# Patient Record
Sex: FEMALE | Race: WHITE | Employment: OTHER | URBAN - METROPOLITAN AREA
[De-identification: names, ages, dates, MRNs, and addresses within clinical notes are randomized per-mention and may not be internally consistent; named-entity substitution may affect disease eponyms.]

---

## 2017-04-07 ENCOUNTER — HOSPITAL ENCOUNTER (OUTPATIENT)
Dept: LAB | Age: 62
Discharge: HOME | End: 2017-04-07
Attending: INTERNAL MEDICINE
Payer: COMMERCIAL

## 2017-04-07 LAB
ALBUMIN SERPL BCP-MCNC: 3.9 G/DL (ref 3.2–4.6)
ALBUMIN/GLOB SERPL: 1.1 {RATIO}
ALP SERPL-CCNC: 108 U/L (ref 50–136)
ALT SERPL-CCNC: 30 U/L (ref 12–65)
ANION GAP BLD CALC-SCNC: 7 MMOL/L
AST SERPL W P-5'-P-CCNC: 28 U/L (ref 15–37)
BILIRUB SERPL-MCNC: 0.6 MG/DL (ref 0.2–1.1)
BUN SERPL-MCNC: 23 MG/DL (ref 8–23)
CALCIUM SERPL-MCNC: 8.6 MG/DL (ref 8.3–10.4)
CHLORIDE SERPL-SCNC: 103 MMOL/L (ref 98–107)
CHOLEST SERPL-MCNC: 137 MG/DL
CO2 SERPL-SCNC: 28 MMOL/L (ref 23–32)
CREAT SERPL-MCNC: 1.3 MG/DL (ref 0.6–1)
GLOBULIN SER CALC-MCNC: 3.7 G/DL
GLUCOSE SERPL-MCNC: 96 MG/DL (ref 65–100)
HDLC SERPL-MCNC: 72 MG/DL (ref 40–60)
HDLC SERPL: 1.9 {RATIO}
LDLC SERPL CALC-MCNC: 46.4 MG/DL
LIPID PROFILE,FLP: ABNORMAL
MAGNESIUM SERPL-MCNC: 2.3 MG/DL (ref 1.8–2.4)
POTASSIUM SERPL-SCNC: 4.4 MMOL/L (ref 3.5–5.1)
PROT SERPL-MCNC: 7.6 G/DL (ref 6.3–8.2)
SODIUM SERPL-SCNC: 138 MMOL/L (ref 136–145)
T4 FREE SERPL-MCNC: 1.7 NG/DL (ref 0.78–1.46)
TRIGL SERPL-MCNC: 93 MG/DL (ref 35–150)
TSH SERPL DL<=0.005 MIU/L-ACNC: 2.14 UIU/ML (ref 0.36–3.74)
VLDLC SERPL CALC-MCNC: 18.6 MG/DL

## 2017-04-07 PROCEDURE — 84439 ASSAY OF FREE THYROXINE: CPT | Performed by: INTERNAL MEDICINE

## 2017-04-07 PROCEDURE — 80061 LIPID PANEL: CPT | Performed by: INTERNAL MEDICINE

## 2017-04-07 PROCEDURE — 83735 ASSAY OF MAGNESIUM: CPT | Performed by: INTERNAL MEDICINE

## 2017-04-07 PROCEDURE — 80053 COMPREHEN METABOLIC PANEL: CPT | Performed by: INTERNAL MEDICINE

## 2017-04-07 PROCEDURE — 36415 COLL VENOUS BLD VENIPUNCTURE: CPT | Performed by: INTERNAL MEDICINE

## 2017-04-07 PROCEDURE — 84443 ASSAY THYROID STIM HORMONE: CPT | Performed by: INTERNAL MEDICINE

## 2017-05-26 ENCOUNTER — HOSPITAL ENCOUNTER (OUTPATIENT)
Dept: LAB | Age: 62
Discharge: HOME | End: 2017-05-26
Attending: INTERNAL MEDICINE
Payer: COMMERCIAL

## 2017-05-26 DIAGNOSIS — I50.22 CHRONIC SYSTOLIC CHF (CONGESTIVE HEART FAILURE) (HCC): ICD-10-CM

## 2017-05-26 DIAGNOSIS — I48.19 PERSISTENT ATRIAL FIBRILLATION (HCC): ICD-10-CM

## 2017-05-26 LAB
ALBUMIN SERPL BCP-MCNC: 3.7 G/DL (ref 3.2–4.6)
ALBUMIN/GLOB SERPL: 0.9 {RATIO}
ALP SERPL-CCNC: 110 U/L (ref 50–136)
ALT SERPL-CCNC: 29 U/L (ref 12–65)
ANION GAP BLD CALC-SCNC: 6 MMOL/L
AST SERPL W P-5'-P-CCNC: 23 U/L (ref 15–37)
BILIRUB SERPL-MCNC: 0.6 MG/DL (ref 0.2–1.1)
BNP SERPL-MCNC: 180 PG/ML
BUN SERPL-MCNC: 16 MG/DL (ref 8–23)
CALCIUM SERPL-MCNC: 8.9 MG/DL (ref 8.3–10.4)
CHLORIDE SERPL-SCNC: 104 MMOL/L (ref 98–107)
CO2 SERPL-SCNC: 30 MMOL/L (ref 21–32)
CREAT SERPL-MCNC: 1.2 MG/DL (ref 0.6–1)
GLOBULIN SER CALC-MCNC: 4 G/DL
GLUCOSE SERPL-MCNC: 106 MG/DL (ref 65–100)
MAGNESIUM SERPL-MCNC: 2.3 MG/DL (ref 1.8–2.4)
POTASSIUM SERPL-SCNC: 4.6 MMOL/L (ref 3.5–5.1)
PROT SERPL-MCNC: 7.7 G/DL (ref 6.3–8.2)
SODIUM SERPL-SCNC: 140 MMOL/L (ref 136–145)

## 2017-05-26 PROCEDURE — 83880 ASSAY OF NATRIURETIC PEPTIDE: CPT | Performed by: INTERNAL MEDICINE

## 2017-05-26 PROCEDURE — 36415 COLL VENOUS BLD VENIPUNCTURE: CPT | Performed by: INTERNAL MEDICINE

## 2017-05-26 PROCEDURE — 80053 COMPREHEN METABOLIC PANEL: CPT | Performed by: INTERNAL MEDICINE

## 2017-05-26 PROCEDURE — 83735 ASSAY OF MAGNESIUM: CPT | Performed by: INTERNAL MEDICINE

## 2017-09-07 ENCOUNTER — HOSPITAL ENCOUNTER (OUTPATIENT)
Dept: LAB | Age: 62
Discharge: HOME | End: 2017-09-07
Attending: INTERNAL MEDICINE
Payer: COMMERCIAL

## 2017-09-07 DIAGNOSIS — I50.22 CHRONIC SYSTOLIC CHF (CONGESTIVE HEART FAILURE) (HCC): ICD-10-CM

## 2017-09-07 DIAGNOSIS — I48.19 PERSISTENT ATRIAL FIBRILLATION (HCC): ICD-10-CM

## 2017-09-07 LAB
ALBUMIN SERPL-MCNC: 3.6 G/DL (ref 3.2–4.6)
ALBUMIN/GLOB SERPL: 0.9 {RATIO}
ALP SERPL-CCNC: 109 U/L (ref 50–136)
ALT SERPL-CCNC: 23 U/L (ref 12–65)
ANION GAP SERPL CALC-SCNC: 7 MMOL/L
AST SERPL-CCNC: 19 U/L (ref 15–37)
BILIRUB SERPL-MCNC: 0.5 MG/DL (ref 0.2–1.1)
BNP SERPL-MCNC: 119 PG/ML
BUN SERPL-MCNC: 19 MG/DL (ref 8–23)
CALCIUM SERPL-MCNC: 8.5 MG/DL (ref 8.3–10.4)
CHLORIDE SERPL-SCNC: 105 MMOL/L (ref 98–107)
CO2 SERPL-SCNC: 26 MMOL/L (ref 21–32)
CREAT SERPL-MCNC: 1.1 MG/DL (ref 0.6–1)
GLOBULIN SER CALC-MCNC: 3.8 G/DL
GLUCOSE SERPL-MCNC: 107 MG/DL (ref 65–100)
MAGNESIUM SERPL-MCNC: 2.2 MG/DL (ref 1.8–2.4)
POTASSIUM SERPL-SCNC: 4.1 MMOL/L (ref 3.5–5.1)
PROT SERPL-MCNC: 7.4 G/DL (ref 6.3–8.2)
SODIUM SERPL-SCNC: 138 MMOL/L (ref 136–145)

## 2017-09-07 PROCEDURE — 80053 COMPREHEN METABOLIC PANEL: CPT | Performed by: INTERNAL MEDICINE

## 2017-09-07 PROCEDURE — 36415 COLL VENOUS BLD VENIPUNCTURE: CPT | Performed by: INTERNAL MEDICINE

## 2017-09-07 PROCEDURE — 83880 ASSAY OF NATRIURETIC PEPTIDE: CPT | Performed by: INTERNAL MEDICINE

## 2017-09-07 PROCEDURE — 83735 ASSAY OF MAGNESIUM: CPT | Performed by: INTERNAL MEDICINE

## 2017-11-07 ENCOUNTER — HOSPITAL ENCOUNTER (OUTPATIENT)
Dept: ULTRASOUND IMAGING | Age: 62
Discharge: HOME | End: 2017-11-07
Attending: INTERNAL MEDICINE
Payer: COMMERCIAL

## 2017-11-07 DIAGNOSIS — M79.604 RIGHT LEG PAIN: ICD-10-CM

## 2017-11-07 DIAGNOSIS — M25.471 ANKLE SWELLING, RIGHT: ICD-10-CM

## 2017-11-07 PROCEDURE — 93971 EXTREMITY STUDY: CPT

## 2018-03-14 ENCOUNTER — HOSPITAL ENCOUNTER (OUTPATIENT)
Dept: LAB | Age: 63
Discharge: HOME | End: 2018-03-14
Attending: INTERNAL MEDICINE
Payer: MEDICARE

## 2018-03-14 DIAGNOSIS — I50.22 CHRONIC SYSTOLIC CHF (CONGESTIVE HEART FAILURE) (HCC): ICD-10-CM

## 2018-03-14 DIAGNOSIS — E78.00 PURE HYPERCHOLESTEROLEMIA: ICD-10-CM

## 2018-03-14 DIAGNOSIS — I48.19 PERSISTENT ATRIAL FIBRILLATION (HCC): ICD-10-CM

## 2018-03-14 LAB
ALBUMIN SERPL-MCNC: 4.3 G/DL (ref 3.2–4.6)
ALBUMIN/GLOB SERPL: 1 {RATIO}
ALP SERPL-CCNC: 114 U/L (ref 50–136)
ALT SERPL-CCNC: 25 U/L (ref 12–65)
ANION GAP SERPL CALC-SCNC: 5 MMOL/L
AST SERPL-CCNC: 24 U/L (ref 15–37)
BILIRUB SERPL-MCNC: 0.7 MG/DL (ref 0.2–1.1)
BUN SERPL-MCNC: 23 MG/DL (ref 8–23)
CALCIUM SERPL-MCNC: 9.2 MG/DL (ref 8.3–10.4)
CHLORIDE SERPL-SCNC: 102 MMOL/L (ref 98–107)
CHOLEST SERPL-MCNC: 133 MG/DL
CO2 SERPL-SCNC: 30 MMOL/L (ref 21–32)
CREAT SERPL-MCNC: 1.2 MG/DL (ref 0.6–1)
GLOBULIN SER CALC-MCNC: 4.3 G/DL
GLUCOSE SERPL-MCNC: 108 MG/DL (ref 65–100)
HDLC SERPL-MCNC: 61 MG/DL (ref 40–60)
HDLC SERPL: 2.2 {RATIO}
LDLC SERPL CALC-MCNC: 43.2 MG/DL
LIPID PROFILE,FLP: ABNORMAL
MAGNESIUM SERPL-MCNC: 2.3 MG/DL (ref 1.8–2.4)
POTASSIUM SERPL-SCNC: 4.5 MMOL/L (ref 3.5–5.1)
PROT SERPL-MCNC: 8.6 G/DL (ref 6.3–8.2)
SODIUM SERPL-SCNC: 137 MMOL/L (ref 136–145)
TRIGL SERPL-MCNC: 144 MG/DL (ref 35–150)
VLDLC SERPL CALC-MCNC: 28.8 MG/DL (ref 6–23)

## 2018-03-14 PROCEDURE — 36415 COLL VENOUS BLD VENIPUNCTURE: CPT | Performed by: INTERNAL MEDICINE

## 2018-03-14 PROCEDURE — 83735 ASSAY OF MAGNESIUM: CPT | Performed by: INTERNAL MEDICINE

## 2018-03-14 PROCEDURE — 80053 COMPREHEN METABOLIC PANEL: CPT | Performed by: INTERNAL MEDICINE

## 2018-03-14 PROCEDURE — 80061 LIPID PANEL: CPT | Performed by: INTERNAL MEDICINE

## 2018-03-30 ENCOUNTER — TELEPHONE (OUTPATIENT)
Dept: CARDIOLOGY | Age: 63
End: 2018-03-30

## 2018-03-30 NOTE — TELEPHONE ENCOUNTER
Patient called with concerns regarding nose bleed. She stated it lasted approx. 25 minutes and running down throat and \"was like a faucet. \"  Reviewed medications and she is on ASA and eliquis. Had her hold ASA over weekend. Will forward message to triage.      Sagar Tinajero NP

## 2018-04-02 NOTE — TELEPHONE ENCOUNTER
Triage informed Dr. Yury Palma in Dr. Bruno Lincoln absence. Per Dr. Marita Capone, continue to hold aspirin and discuss with Dr. Fraga Stands 4/9/18 when he returns to the office. Triage informed pt of Dr. Sandrita Hernández response. She verbalizes understanding.

## 2018-04-02 NOTE — TELEPHONE ENCOUNTER
Pt states she developed a nosebleed this weekend that lasted 30 minutes and was choking her as the blood ran down her throat. States Wynelle Even. NP advised her to hold her aspirin, and she has not had any recurrence over the weekend. Pt denies having past stents and continues on Eliquis 5mg bid. Triage will discuss with another physician in Dr. Benjamín sagastume.

## 2018-04-09 NOTE — TELEPHONE ENCOUNTER
Triage informed Dr. Shi Bachelor. Per Dr. Shi Bachelor, pt may stop aspirin 81mg. Triage informed pt of Dr. Rancho Benitez response. Pt verbalizes understanding.

## 2018-10-17 ENCOUNTER — HOSPITAL ENCOUNTER (OUTPATIENT)
Dept: LAB | Age: 63
Discharge: HOME | End: 2018-10-17
Payer: MEDICARE

## 2018-10-17 DIAGNOSIS — I50.22 CHRONIC SYSTOLIC CHF (CONGESTIVE HEART FAILURE) (HCC): ICD-10-CM

## 2018-10-17 LAB
ANION GAP SERPL CALC-SCNC: 7 MMOL/L
BUN SERPL-MCNC: 23 MG/DL (ref 8–23)
CALCIUM SERPL-MCNC: 9 MG/DL (ref 8.3–10.4)
CHLORIDE SERPL-SCNC: 100 MMOL/L (ref 98–107)
CO2 SERPL-SCNC: 29 MMOL/L (ref 21–32)
CREAT SERPL-MCNC: 1.4 MG/DL (ref 0.6–1)
GLUCOSE SERPL-MCNC: 100 MG/DL (ref 65–100)
POTASSIUM SERPL-SCNC: 4.3 MMOL/L (ref 3.5–5.1)
SODIUM SERPL-SCNC: 136 MMOL/L (ref 136–145)

## 2018-10-17 PROCEDURE — 80048 BASIC METABOLIC PNL TOTAL CA: CPT

## 2018-10-17 PROCEDURE — 36415 COLL VENOUS BLD VENIPUNCTURE: CPT

## 2019-08-22 PROBLEM — G47.00 PERSISTENT DISORDER OF INITIATING OR MAINTAINING SLEEP: Status: ACTIVE | Noted: 2019-08-22

## 2019-10-01 ENCOUNTER — HOSPITAL ENCOUNTER (OUTPATIENT)
Dept: LAB | Age: 64
Discharge: HOME | End: 2019-10-01
Payer: MEDICARE

## 2019-10-01 DIAGNOSIS — I50.22 CHRONIC SYSTOLIC CHF (CONGESTIVE HEART FAILURE) (HCC): ICD-10-CM

## 2019-10-01 DIAGNOSIS — I48.19 PERSISTENT ATRIAL FIBRILLATION (HCC): ICD-10-CM

## 2019-10-01 LAB
ANION GAP SERPL CALC-SCNC: 8 MMOL/L (ref 7–16)
BUN SERPL-MCNC: 16 MG/DL (ref 8–23)
CALCIUM SERPL-MCNC: 9 MG/DL (ref 8.3–10.4)
CHLORIDE SERPL-SCNC: 103 MMOL/L (ref 98–107)
CO2 SERPL-SCNC: 29 MMOL/L (ref 21–32)
CREAT SERPL-MCNC: 1 MG/DL (ref 0.6–1)
GLUCOSE SERPL-MCNC: 95 MG/DL (ref 65–100)
POTASSIUM SERPL-SCNC: 4 MMOL/L (ref 3.5–5.1)
SODIUM SERPL-SCNC: 140 MMOL/L (ref 136–145)

## 2019-10-01 PROCEDURE — 36415 COLL VENOUS BLD VENIPUNCTURE: CPT

## 2019-10-01 PROCEDURE — 80048 BASIC METABOLIC PNL TOTAL CA: CPT

## 2019-11-07 ENCOUNTER — HOSPITAL ENCOUNTER (OUTPATIENT)
Dept: LAB | Age: 64
Discharge: HOME | End: 2019-11-07
Payer: MEDICARE

## 2019-11-07 DIAGNOSIS — I48.19 PERSISTENT ATRIAL FIBRILLATION (HCC): ICD-10-CM

## 2019-11-07 LAB
ANION GAP SERPL CALC-SCNC: 7 MMOL/L (ref 7–16)
BASOPHILS # BLD: 0 K/UL (ref 0–0.2)
BASOPHILS NFR BLD: 1 % (ref 0–2)
BUN SERPL-MCNC: 19 MG/DL (ref 8–23)
CALCIUM SERPL-MCNC: 8.8 MG/DL (ref 8.3–10.4)
CHLORIDE SERPL-SCNC: 106 MMOL/L (ref 98–107)
CO2 SERPL-SCNC: 28 MMOL/L (ref 21–32)
CREAT SERPL-MCNC: 1 MG/DL (ref 0.6–1)
DIFFERENTIAL METHOD BLD: ABNORMAL
EOSINOPHIL # BLD: 0.1 K/UL (ref 0–0.8)
EOSINOPHIL NFR BLD: 2 % (ref 0.5–7.8)
ERYTHROCYTE [DISTWIDTH] IN BLOOD BY AUTOMATED COUNT: 13.1 % (ref 11.9–14.6)
GLUCOSE SERPL-MCNC: 132 MG/DL (ref 65–100)
HCT VFR BLD AUTO: 40.6 % (ref 35.8–46.3)
HGB BLD-MCNC: 12.7 G/DL (ref 11.7–15.4)
IMM GRANULOCYTES # BLD AUTO: 0 K/UL (ref 0–0.5)
IMM GRANULOCYTES NFR BLD AUTO: 0 % (ref 0–5)
LYMPHOCYTES # BLD: 2.6 K/UL (ref 0.5–4.6)
LYMPHOCYTES NFR BLD: 29 % (ref 13–44)
MAGNESIUM SERPL-MCNC: 1.9 MG/DL (ref 1.8–2.4)
MCH RBC QN AUTO: 29.5 PG (ref 26.1–32.9)
MCHC RBC AUTO-ENTMCNC: 31.3 G/DL (ref 31.4–35)
MCV RBC AUTO: 94.4 FL (ref 79.6–97.8)
MONOCYTES # BLD: 0.5 K/UL (ref 0.1–1.3)
MONOCYTES NFR BLD: 6 % (ref 4–12)
NEUTS SEG # BLD: 5.4 K/UL (ref 1.7–8.2)
NEUTS SEG NFR BLD: 62 % (ref 43–78)
NRBC # BLD: 0 K/UL (ref 0–0.2)
PLATELET # BLD AUTO: 217 K/UL (ref 150–450)
PMV BLD AUTO: 9.9 FL (ref 9.4–12.3)
POTASSIUM SERPL-SCNC: 4 MMOL/L (ref 3.5–5.1)
RBC # BLD AUTO: 4.3 M/UL (ref 4.05–5.2)
SODIUM SERPL-SCNC: 141 MMOL/L (ref 136–145)
WBC # BLD AUTO: 8.7 K/UL (ref 4.3–11.1)

## 2019-11-07 PROCEDURE — 83735 ASSAY OF MAGNESIUM: CPT

## 2019-11-07 PROCEDURE — 80048 BASIC METABOLIC PNL TOTAL CA: CPT

## 2019-11-07 PROCEDURE — 36415 COLL VENOUS BLD VENIPUNCTURE: CPT

## 2019-11-07 PROCEDURE — 85025 COMPLETE CBC W/AUTO DIFF WBC: CPT

## 2019-11-08 NOTE — PROGRESS NOTES
Patient pre-assessment complete for Atrial fib ablation with Dr Raul Real scheduled for 19 at 10am, arrival time 8am. Patient verified using . Patient instructed to bring all home medications in labeled bottles on the day of procedure. NPO status reinforced. Patient instructed to HOLD eliquis starting  am - HOLD entresto starting  pm & also hold lasix the am of procedure. Instructed they can take all other medications excluding vitamins & supplements. Patient verbalizes understanding of all instructions & denies any questions at this time.

## 2019-11-11 ENCOUNTER — ANESTHESIA (OUTPATIENT)
Dept: SURGERY | Age: 64
End: 2019-11-11
Payer: MEDICARE

## 2019-11-11 ENCOUNTER — ANESTHESIA EVENT (OUTPATIENT)
Dept: SURGERY | Age: 64
End: 2019-11-11
Payer: MEDICARE

## 2019-11-11 ENCOUNTER — HOSPITAL ENCOUNTER (OUTPATIENT)
Age: 64
Setting detail: OBSERVATION
Discharge: HOME | End: 2019-11-12
Attending: INTERNAL MEDICINE | Admitting: INTERNAL MEDICINE
Payer: MEDICARE

## 2019-11-11 ENCOUNTER — APPOINTMENT (OUTPATIENT)
Dept: CARDIAC CATH/INVASIVE PROCEDURES | Age: 64
End: 2019-11-11
Payer: MEDICARE

## 2019-11-11 PROBLEM — I48.91 A-FIB (HCC): Status: ACTIVE | Noted: 2019-11-11

## 2019-11-11 LAB
ACT BLD: 318 SECS (ref 70–128)
ACT BLD: 323 SECS (ref 70–128)
ANION GAP SERPL CALC-SCNC: 5 MMOL/L (ref 7–16)
ATRIAL RATE: 70 BPM
ATRIAL RATE: 87 BPM
BUN SERPL-MCNC: 26 MG/DL (ref 8–23)
CALCIUM SERPL-MCNC: 9 MG/DL (ref 8.3–10.4)
CALCULATED P AXIS, ECG09: 71 DEGREES
CALCULATED R AXIS, ECG10: -33 DEGREES
CALCULATED R AXIS, ECG10: -75 DEGREES
CALCULATED T AXIS, ECG11: -71 DEGREES
CALCULATED T AXIS, ECG11: 103 DEGREES
CHLORIDE SERPL-SCNC: 106 MMOL/L (ref 98–107)
CO2 SERPL-SCNC: 28 MMOL/L (ref 21–32)
CREAT SERPL-MCNC: 1.07 MG/DL (ref 0.6–1)
DIAGNOSIS, 93000: NORMAL
DIAGNOSIS, 93000: NORMAL
ERYTHROCYTE [DISTWIDTH] IN BLOOD BY AUTOMATED COUNT: 13.1 % (ref 11.9–14.6)
GLUCOSE SERPL-MCNC: 111 MG/DL (ref 65–100)
HCT VFR BLD AUTO: 39.4 % (ref 35.8–46.3)
HGB BLD-MCNC: 12.7 G/DL (ref 11.7–15.4)
INR PPP: 1
MAGNESIUM SERPL-MCNC: 2 MG/DL (ref 1.8–2.4)
MCH RBC QN AUTO: 30.5 PG (ref 26.1–32.9)
MCHC RBC AUTO-ENTMCNC: 32.2 G/DL (ref 31.4–35)
MCV RBC AUTO: 94.5 FL (ref 79.6–97.8)
NRBC # BLD: 0 K/UL (ref 0–0.2)
P-R INTERVAL, ECG05: 204 MS
PLATELET # BLD AUTO: 217 K/UL (ref 150–450)
PMV BLD AUTO: 10.7 FL (ref 9.4–12.3)
POTASSIUM SERPL-SCNC: 4 MMOL/L (ref 3.5–5.1)
PROTHROMBIN TIME: 13.8 SEC (ref 11.7–14.5)
Q-T INTERVAL, ECG07: 464 MS
Q-T INTERVAL, ECG07: 500 MS
QRS DURATION, ECG06: 108 MS
QRS DURATION, ECG06: 180 MS
QTC CALCULATION (BEZET), ECG08: 501 MS
QTC CALCULATION (BEZET), ECG08: 576 MS
RBC # BLD AUTO: 4.17 M/UL (ref 4.05–5.2)
SODIUM SERPL-SCNC: 139 MMOL/L (ref 136–145)
VENTRICULAR RATE, ECG03: 70 BPM
VENTRICULAR RATE, ECG03: 80 BPM
WBC # BLD AUTO: 6.1 K/UL (ref 4.3–11.1)

## 2019-11-11 PROCEDURE — 85610 PROTHROMBIN TIME: CPT

## 2019-11-11 PROCEDURE — C1732 CATH, EP, DIAG/ABL, 3D/VECT: HCPCS

## 2019-11-11 PROCEDURE — 77030027107 HC PTCH EXT REF CARTO3 J&J -F

## 2019-11-11 PROCEDURE — C1894 INTRO/SHEATH, NON-LASER: HCPCS

## 2019-11-11 PROCEDURE — 74011250637 HC RX REV CODE- 250/637: Performed by: INTERNAL MEDICINE

## 2019-11-11 PROCEDURE — 93657 TX L/R ATRIAL FIB ADDL: CPT

## 2019-11-11 PROCEDURE — 76060000034 HC ANESTHESIA 1.5 TO 2 HR: Performed by: INTERNAL MEDICINE

## 2019-11-11 PROCEDURE — 74011250636 HC RX REV CODE- 250/636: Performed by: INTERNAL MEDICINE

## 2019-11-11 PROCEDURE — 77030013794 HC KT TRNSDUC BLD EDWD -B: Performed by: ANESTHESIOLOGY

## 2019-11-11 PROCEDURE — 80048 BASIC METABOLIC PNL TOTAL CA: CPT

## 2019-11-11 PROCEDURE — 77030013292 HC BOWL MX PRSM J&J -A: Performed by: ANESTHESIOLOGY

## 2019-11-11 PROCEDURE — 99218 HC RM OBSERVATION: CPT

## 2019-11-11 PROCEDURE — 74011000250 HC RX REV CODE- 250: Performed by: ANESTHESIOLOGY

## 2019-11-11 PROCEDURE — 77030039425 HC BLD LARYNG TRULITE DISP TELE -A: Performed by: NURSE ANESTHETIST, CERTIFIED REGISTERED

## 2019-11-11 PROCEDURE — 93005 ELECTROCARDIOGRAM TRACING: CPT | Performed by: INTERNAL MEDICINE

## 2019-11-11 PROCEDURE — 93656 COMPRE EP EVAL ABLTJ ATR FIB: CPT

## 2019-11-11 PROCEDURE — 92960 CARDIOVERSION ELECTRIC EXT: CPT

## 2019-11-11 PROCEDURE — 77030013687 HC GD NDL BARD -B

## 2019-11-11 PROCEDURE — 77030019908 HC STETH ESOPH SIMS -A: Performed by: ANESTHESIOLOGY

## 2019-11-11 PROCEDURE — 93623 PRGRMD STIMJ&PACG IV RX NFS: CPT

## 2019-11-11 PROCEDURE — 77030037088 HC TUBE ENDOTRACH ORAL NSL COVD-A: Performed by: NURSE ANESTHETIST, CERTIFIED REGISTERED

## 2019-11-11 PROCEDURE — 74011250636 HC RX REV CODE- 250/636: Performed by: NURSE ANESTHETIST, CERTIFIED REGISTERED

## 2019-11-11 PROCEDURE — C1759 CATH, INTRA ECHOCARDIOGRAPHY: HCPCS

## 2019-11-11 PROCEDURE — 77030018846 HC SOL IRR STRL H20 ICUM -A

## 2019-11-11 PROCEDURE — 77030020506 HC NDL TRNSPTL NRG BAYL -F

## 2019-11-11 PROCEDURE — 85027 COMPLETE CBC AUTOMATED: CPT

## 2019-11-11 PROCEDURE — 74011000250 HC RX REV CODE- 250: Performed by: NURSE ANESTHETIST, CERTIFIED REGISTERED

## 2019-11-11 PROCEDURE — 77030035291 HC TBNG PMP SMARTABLATE J&J -B

## 2019-11-11 PROCEDURE — C1893 INTRO/SHEATH, FIXED,NON-PEEL: HCPCS

## 2019-11-11 PROCEDURE — 93662 INTRACARDIAC ECG (ICE): CPT

## 2019-11-11 PROCEDURE — 93655 ICAR CATH ABLTJ DSCRT ARRHYT: CPT

## 2019-11-11 PROCEDURE — 77030013794 HC KT TRNSDUC BLD EDWD -B

## 2019-11-11 PROCEDURE — 76210000006 HC OR PH I REC 0.5 TO 1 HR: Performed by: INTERNAL MEDICINE

## 2019-11-11 PROCEDURE — 83735 ASSAY OF MAGNESIUM: CPT

## 2019-11-11 PROCEDURE — 77030002912 HC SUT ETHBND J&J -A

## 2019-11-11 PROCEDURE — 93613 INTRACARDIAC EPHYS 3D MAPG: CPT

## 2019-11-11 PROCEDURE — 93312 ECHO TRANSESOPHAGEAL: CPT

## 2019-11-11 PROCEDURE — 93622 COMP EP EVAL L VENTR PAC&REC: CPT

## 2019-11-11 PROCEDURE — 74011000258 HC RX REV CODE- 258: Performed by: ANESTHESIOLOGY

## 2019-11-11 PROCEDURE — 77030005401 HC CATH RAD ARRO -A: Performed by: ANESTHESIOLOGY

## 2019-11-11 PROCEDURE — 85347 COAGULATION TIME ACTIVATED: CPT

## 2019-11-11 RX ORDER — GLYCOPYRROLATE 0.2 MG/ML
INJECTION INTRAMUSCULAR; INTRAVENOUS AS NEEDED
Status: DISCONTINUED | OUTPATIENT
Start: 2019-11-11 | End: 2019-11-11 | Stop reason: HOSPADM

## 2019-11-11 RX ORDER — CARVEDILOL 6.25 MG/1
6.25 TABLET ORAL 2 TIMES DAILY WITH MEALS
Status: DISCONTINUED | OUTPATIENT
Start: 2019-11-11 | End: 2019-11-12 | Stop reason: HOSPADM

## 2019-11-11 RX ORDER — PANTOPRAZOLE SODIUM 40 MG/1
40 TABLET, DELAYED RELEASE ORAL EVERY 12 HOURS
Qty: 60 TAB | Refills: 0 | Status: SHIPPED | OUTPATIENT
Start: 2019-11-12 | End: 2019-11-11

## 2019-11-11 RX ORDER — SODIUM CHLORIDE, SODIUM LACTATE, POTASSIUM CHLORIDE, CALCIUM CHLORIDE 600; 310; 30; 20 MG/100ML; MG/100ML; MG/100ML; MG/100ML
75 INJECTION, SOLUTION INTRAVENOUS CONTINUOUS
Status: DISCONTINUED | OUTPATIENT
Start: 2019-11-11 | End: 2019-11-11

## 2019-11-11 RX ORDER — CLONAZEPAM 0.5 MG/1
0.5 TABLET ORAL
Status: DISCONTINUED | OUTPATIENT
Start: 2019-11-11 | End: 2019-11-12 | Stop reason: HOSPADM

## 2019-11-11 RX ORDER — ROCURONIUM BROMIDE 10 MG/ML
INJECTION, SOLUTION INTRAVENOUS AS NEEDED
Status: DISCONTINUED | OUTPATIENT
Start: 2019-11-11 | End: 2019-11-11 | Stop reason: HOSPADM

## 2019-11-11 RX ORDER — SODIUM CHLORIDE 0.9 % (FLUSH) 0.9 %
5-40 SYRINGE (ML) INJECTION EVERY 8 HOURS
Status: DISCONTINUED | OUTPATIENT
Start: 2019-11-11 | End: 2019-11-12 | Stop reason: HOSPADM

## 2019-11-11 RX ORDER — PROPOFOL 10 MG/ML
INJECTION, EMULSION INTRAVENOUS AS NEEDED
Status: DISCONTINUED | OUTPATIENT
Start: 2019-11-11 | End: 2019-11-11 | Stop reason: HOSPADM

## 2019-11-11 RX ORDER — SUCRALFATE 1 G/1
1 TABLET ORAL
Qty: 120 TAB | Refills: 0 | Status: SHIPPED | OUTPATIENT
Start: 2019-11-12 | End: 2020-04-22

## 2019-11-11 RX ORDER — HEPARIN SODIUM 200 [USP'U]/100ML
4000 INJECTION, SOLUTION INTRAVENOUS AS NEEDED
Status: DISCONTINUED | OUTPATIENT
Start: 2019-11-11 | End: 2019-11-11 | Stop reason: HOSPADM

## 2019-11-11 RX ORDER — METFORMIN HYDROCHLORIDE 500 MG/1
500 TABLET ORAL
Status: DISCONTINUED | OUTPATIENT
Start: 2019-11-11 | End: 2019-11-12 | Stop reason: HOSPADM

## 2019-11-11 RX ORDER — SODIUM CHLORIDE, SODIUM LACTATE, POTASSIUM CHLORIDE, CALCIUM CHLORIDE 600; 310; 30; 20 MG/100ML; MG/100ML; MG/100ML; MG/100ML
100 INJECTION, SOLUTION INTRAVENOUS CONTINUOUS
Status: DISCONTINUED | OUTPATIENT
Start: 2019-11-11 | End: 2019-11-11 | Stop reason: HOSPADM

## 2019-11-11 RX ORDER — EPHEDRINE SULFATE/0.9% NACL/PF 50 MG/5 ML
SYRINGE (ML) INTRAVENOUS AS NEEDED
Status: DISCONTINUED | OUTPATIENT
Start: 2019-11-11 | End: 2019-11-11 | Stop reason: HOSPADM

## 2019-11-11 RX ORDER — FUROSEMIDE 20 MG/1
20 TABLET ORAL
Status: DISCONTINUED | OUTPATIENT
Start: 2019-11-11 | End: 2019-11-12 | Stop reason: HOSPADM

## 2019-11-11 RX ORDER — FENTANYL CITRATE 50 UG/ML
INJECTION, SOLUTION INTRAMUSCULAR; INTRAVENOUS AS NEEDED
Status: DISCONTINUED | OUTPATIENT
Start: 2019-11-11 | End: 2019-11-11 | Stop reason: HOSPADM

## 2019-11-11 RX ORDER — SODIUM CHLORIDE 0.9 % (FLUSH) 0.9 %
5-40 SYRINGE (ML) INJECTION AS NEEDED
Status: DISCONTINUED | OUTPATIENT
Start: 2019-11-11 | End: 2019-11-12 | Stop reason: HOSPADM

## 2019-11-11 RX ORDER — PANTOPRAZOLE SODIUM 40 MG/1
40 TABLET, DELAYED RELEASE ORAL EVERY 12 HOURS
Status: DISCONTINUED | OUTPATIENT
Start: 2019-11-11 | End: 2019-11-12 | Stop reason: HOSPADM

## 2019-11-11 RX ORDER — HEPARIN SODIUM 1000 [USP'U]/ML
INJECTION, SOLUTION INTRAVENOUS; SUBCUTANEOUS AS NEEDED
Status: DISCONTINUED | OUTPATIENT
Start: 2019-11-11 | End: 2019-11-11 | Stop reason: HOSPADM

## 2019-11-11 RX ORDER — PROTAMINE SULFATE 10 MG/ML
INJECTION, SOLUTION INTRAVENOUS AS NEEDED
Status: DISCONTINUED | OUTPATIENT
Start: 2019-11-11 | End: 2019-11-11 | Stop reason: HOSPADM

## 2019-11-11 RX ORDER — PANTOPRAZOLE SODIUM 40 MG/1
40 TABLET, DELAYED RELEASE ORAL EVERY 12 HOURS
Qty: 60 TAB | Refills: 0 | Status: SHIPPED | OUTPATIENT
Start: 2019-11-12 | End: 2020-04-22

## 2019-11-11 RX ORDER — HEPARIN SODIUM 5000 [USP'U]/100ML
INJECTION, SOLUTION INTRAVENOUS
Status: DISCONTINUED | OUTPATIENT
Start: 2019-11-11 | End: 2019-11-11 | Stop reason: HOSPADM

## 2019-11-11 RX ORDER — ACETAMINOPHEN 325 MG/1
650 TABLET ORAL
Status: DISCONTINUED | OUTPATIENT
Start: 2019-11-11 | End: 2019-11-12 | Stop reason: HOSPADM

## 2019-11-11 RX ORDER — ADENOSINE 3 MG/ML
140 INJECTION, SOLUTION INTRAVENOUS
Status: DISCONTINUED | OUTPATIENT
Start: 2019-11-11 | End: 2019-11-11 | Stop reason: HOSPADM

## 2019-11-11 RX ORDER — ATORVASTATIN CALCIUM 10 MG/1
20 TABLET, FILM COATED ORAL DAILY
Status: DISCONTINUED | OUTPATIENT
Start: 2019-11-11 | End: 2019-11-12 | Stop reason: HOSPADM

## 2019-11-11 RX ORDER — CEFAZOLIN SODIUM/WATER 2 G/20 ML
2 SYRINGE (ML) INTRAVENOUS ONCE
Status: COMPLETED | OUTPATIENT
Start: 2019-11-11 | End: 2019-11-11

## 2019-11-11 RX ORDER — SUCRALFATE 1 G/1
1 TABLET ORAL
Status: DISCONTINUED | OUTPATIENT
Start: 2019-11-11 | End: 2019-11-12 | Stop reason: HOSPADM

## 2019-11-11 RX ORDER — ONDANSETRON 2 MG/ML
INJECTION INTRAMUSCULAR; INTRAVENOUS AS NEEDED
Status: DISCONTINUED | OUTPATIENT
Start: 2019-11-11 | End: 2019-11-11 | Stop reason: HOSPADM

## 2019-11-11 RX ORDER — LEVOTHYROXINE SODIUM 88 UG/1
88 TABLET ORAL
Status: DISCONTINUED | OUTPATIENT
Start: 2019-11-12 | End: 2019-11-12 | Stop reason: HOSPADM

## 2019-11-11 RX ORDER — SUCCINYLCHOLINE CHLORIDE 20 MG/ML
INJECTION INTRAMUSCULAR; INTRAVENOUS AS NEEDED
Status: DISCONTINUED | OUTPATIENT
Start: 2019-11-11 | End: 2019-11-11 | Stop reason: HOSPADM

## 2019-11-11 RX ORDER — HYDROCODONE BITARTRATE AND ACETAMINOPHEN 5; 325 MG/1; MG/1
1 TABLET ORAL
Status: DISCONTINUED | OUTPATIENT
Start: 2019-11-11 | End: 2019-11-12 | Stop reason: HOSPADM

## 2019-11-11 RX ORDER — LIDOCAINE HYDROCHLORIDE 20 MG/ML
INJECTION, SOLUTION EPIDURAL; INFILTRATION; INTRACAUDAL; PERINEURAL AS NEEDED
Status: DISCONTINUED | OUTPATIENT
Start: 2019-11-11 | End: 2019-11-11 | Stop reason: HOSPADM

## 2019-11-11 RX ORDER — NEOSTIGMINE METHYLSULFATE 1 MG/ML
INJECTION, SOLUTION INTRAVENOUS AS NEEDED
Status: DISCONTINUED | OUTPATIENT
Start: 2019-11-11 | End: 2019-11-11 | Stop reason: HOSPADM

## 2019-11-11 RX ADMIN — ACETAMINOPHEN 650 MG: 325 TABLET, FILM COATED ORAL at 22:11

## 2019-11-11 RX ADMIN — SUCRALFATE 1 G: 1 TABLET ORAL at 21:11

## 2019-11-11 RX ADMIN — ATORVASTATIN CALCIUM 20 MG: 10 TABLET, FILM COATED ORAL at 22:11

## 2019-11-11 RX ADMIN — FENTANYL CITRATE 100 MCG: 50 INJECTION INTRAMUSCULAR; INTRAVENOUS at 10:18

## 2019-11-11 RX ADMIN — Medication 10 ML: at 21:23

## 2019-11-11 RX ADMIN — Medication 3 MG: at 11:50

## 2019-11-11 RX ADMIN — PROTAMINE SULFATE 30 MG: 10 INJECTION, SOLUTION INTRAVENOUS at 11:49

## 2019-11-11 RX ADMIN — ACETAMINOPHEN 650 MG: 325 TABLET, FILM COATED ORAL at 17:49

## 2019-11-11 RX ADMIN — PROTAMINE SULFATE 10 MG: 10 INJECTION, SOLUTION INTRAVENOUS at 11:47

## 2019-11-11 RX ADMIN — HEPARIN SODIUM AND DEXTROSE 40 UNITS/KG/HR: 5000; 5 INJECTION INTRAVENOUS at 10:55

## 2019-11-11 RX ADMIN — PANTOPRAZOLE SODIUM 40 MG: 40 TABLET, DELAYED RELEASE ORAL at 21:11

## 2019-11-11 RX ADMIN — PROTAMINE SULFATE 10 MG: 10 INJECTION, SOLUTION INTRAVENOUS at 11:46

## 2019-11-11 RX ADMIN — ONDANSETRON 4 MG: 2 INJECTION INTRAMUSCULAR; INTRAVENOUS at 11:45

## 2019-11-11 RX ADMIN — PROPOFOL 100 MG: 10 INJECTION, EMULSION INTRAVENOUS at 10:18

## 2019-11-11 RX ADMIN — ADENOSINE 13.97 MG/MIN: 90 INJECTION, SOLUTION INTRAVENOUS at 10:58

## 2019-11-11 RX ADMIN — PROTAMINE SULFATE 10 MG: 10 INJECTION, SOLUTION INTRAVENOUS at 11:45

## 2019-11-11 RX ADMIN — Medication 10 MG: at 10:18

## 2019-11-11 RX ADMIN — GLYCOPYRROLATE 0.4 MG: 0.2 INJECTION, SOLUTION INTRAMUSCULAR; INTRAVENOUS at 11:50

## 2019-11-11 RX ADMIN — Medication 2 G: at 10:32

## 2019-11-11 RX ADMIN — CARVEDILOL 6.25 MG: 6.25 TABLET, FILM COATED ORAL at 16:11

## 2019-11-11 RX ADMIN — Medication 10 MG: at 11:47

## 2019-11-11 RX ADMIN — HEPARIN SODIUM 17500 UNITS: 1000 INJECTION INTRAVENOUS; SUBCUTANEOUS at 10:55

## 2019-11-11 RX ADMIN — LIDOCAINE HYDROCHLORIDE 60 MG: 20 INJECTION, SOLUTION EPIDURAL; INFILTRATION; INTRACAUDAL; PERINEURAL at 10:18

## 2019-11-11 RX ADMIN — NOREPINEPHRINE BITARTRATE 4 MCG/MIN: 1 INJECTION, SOLUTION, CONCENTRATE INTRAVENOUS at 10:21

## 2019-11-11 RX ADMIN — PROTAMINE SULFATE 30 MG: 10 INJECTION, SOLUTION INTRAVENOUS at 11:50

## 2019-11-11 RX ADMIN — SUCCINYLCHOLINE CHLORIDE 80 MG: 20 INJECTION, SOLUTION INTRAMUSCULAR; INTRAVENOUS at 10:18

## 2019-11-11 RX ADMIN — ROCURONIUM BROMIDE 40 MG: 10 INJECTION, SOLUTION INTRAVENOUS at 10:26

## 2019-11-11 RX ADMIN — SUCRALFATE 1 G: 1 TABLET ORAL at 16:11

## 2019-11-11 RX ADMIN — PROTAMINE SULFATE 10 MG: 10 INJECTION, SOLUTION INTRAVENOUS at 11:48

## 2019-11-11 RX ADMIN — SODIUM CHLORIDE, SODIUM LACTATE, POTASSIUM CHLORIDE, AND CALCIUM CHLORIDE 75 ML/HR: 600; 310; 30; 20 INJECTION, SOLUTION INTRAVENOUS at 08:37

## 2019-11-11 RX ADMIN — APIXABAN 5 MG: 5 TABLET, FILM COATED ORAL at 21:12

## 2019-11-11 RX ADMIN — Medication 10 ML: at 17:08

## 2019-11-11 RX ADMIN — ROCURONIUM BROMIDE 10 MG: 10 INJECTION, SOLUTION INTRAVENOUS at 10:18

## 2019-11-11 RX ADMIN — HEPARIN SODIUM 8000 UNITS: 200 INJECTION, SOLUTION INTRAVENOUS at 10:58

## 2019-11-11 RX ADMIN — SACUBITRIL AND VALSARTAN 1 TABLET: 24; 26 TABLET, FILM COATED ORAL at 21:12

## 2019-11-11 NOTE — PROGRESS NOTES
TRANSFER - IN REPORT:    Verbal report received from WENDY Josue(name) on Memo Go  being received from pacu(unit) for routine post - op      Report consisted of patients Situation, Background, Assessment and   Recommendations(SBAR). Information from the following report(s) SBAR, Kardex, Procedure Summary, Intake/Output, MAR, Recent Results, Med Rec Status and Cardiac Rhythm paced was reviewed with the receiving nurse. Opportunity for questions and clarification was provided. Assessment completed upon patients arrival to unit and care assumed. Dual skin assessment completed with RN. All skin inspected, no breakdown or redness noted. Bilateral groin sites are clean, dry, and intact.

## 2019-11-11 NOTE — PROCEDURES
Pre-Electrophysiology Diagnosis  1. Persistent atrial fibrillation     Procedure Performed  1. EPS afib ablation/pulmonary vein isolation  2. Left atrial pacing recording from the coronary sinus. 3. 3-D Electroanatomical mapping  4. Intracardiac echo  5. Ablation of second arrhythmia  6. LV pacing and recording  7. Transesophageal echo  8. Cardioversion  9. Ablation of additional linear lines x 2    Anesthesia: General     Estimated Blood Loss: Less than 10 mL     Specimens: * No specimens in log *    Procedure in Detail:  The patient was brought to the electrophysiology lab in the fasting state. The patient was intubated by anesthesiology and an esophageal temperature probe inserted and advanced to a location directly posterior to the LA at the level of the pulmonary veins. The esophageal temperature probe was repositioned throughout the case to a location as close the the ablation catheter as possible. If the esophageal temperature increased 0.5 degrees Celsius ablation was stopped until the temperature returned to baseline. A tranesophageal echocardiogram was performed directly prior to the procedure and was negative for a MADELYN thrombus (see full report in chart). A Ref-Star CARTO patch was placed, the patient was then prepped and draped in sterile fashion. Venous access was obtained under ultrasound guidance x4 using modified Seldinger technique, with placement of one 8Fr short sidearm sheath and two 8.5 Fr SL-O 63cm long braided sheaths in the right femoral vein and placement of a 10Fr sheath into the left femoral vein. The patient presented to the EP lab in atrial fibrillation and underwent DCCV with pads across the anterior and posterior chest.  An intra-cardiac echo probe was prepped, and then inserted into an 10 Fr short sheath and used to localize the fossa ovalis and create LA and pulmonary vein anatomy utilizing [facility].   A Direct Dermat[employer] Ross Pentaray catheter was then inserted into an 8. 5 SLO Fr sheath and used to create an electroanatomical map of the right atrium, including attempts at His localization and the os of the CS. Then a Smart Touch porous irrigated BW 3.5mm ablation catheter was used to map out the body of the CS. A decapolar Biosense Ross CS catheter was inserted via an 8Fr sheath and positioned in the mid coronary sinus. Next, a trans-septal needle was inserted into the SLO and was used to perform a trans-septal puncture with assistance from ICE (intra-cardiac echo) as well as the [address] [place] [address],[address] map and the right atrial impedence map. The SLO sheath was advanced into the LA. Total weight based heparin bolus was administered just after transeptal access, and systemic blood pressure monitored invasively. The patient was then placed on our heparin weight based nomogram for targeted ACT of 300-350 during the ablation procedure. A second trans-septal access was obtained with the ablation catheter. The Pentaray catheter was then inserted into the LA via the SL-O sheath and was used to create a detail electroanatomical voltage map of the left atrium including the pulmonary veins to identify the pulmonary vein sleeves and further guide additional ablation as pictured below. The Pentaray was used to map pulmonary vein potentials and target ablation. An 8 Fr, 3.5mm tip saline irrigated bidirectional D/F curve Thermo-cool SmartTouch catheter was used for RF ablation and ablation was performed at 40-45W with reduction in power as needed if ablation was performed in close proximity to the esophagus. Antral pulmonary vein isolation was then performed for all 4 pulmonary veins. Entrance and exit block was demonstrated by left atrial pacing and within the pulmonary veins. Lack of any conducted atrial EGMs into the pulmonary veins was documented.   Prior to ablation of the right antral pulmonary veins, the ablation catheter was used to pace at high output to try to localize the right phrenic nerve and map its location prior to ablation. The patient spontaneously reverted back into afib. Additional linear ablation was performed along the LA roof and LA floor in attempts to isolate the LA posterior wall. The posterior wall was not fully isolated 2/2 esophageal location. The ablation catheter was inserted into the LV, documented LV electrograms and was used to pace the LV for the EP study and for rescue pacing during adenosine infusion. Post PVI, the Pentaray was used to perform a second LA voltage map post ablation to demonstrate pulmonary vein isolation and post ablation voltage as pictured below. Baseline LA Voltage Map      Post Ablation LA Voltage Map      Cavotricuspid Isthmus ablation (right atrial flutter)  Linear ablation across the cavo-tricuspid isthmus was performed starting with 1:2 A:V EGMs along the isthmus at 6pm Frisian. The local electrogram activation sequence, differential pacing maneuvers and electrogram timing was used to demonstrate bidirectional block along the cavotricuspid isthmus. Post-Ablation trans-isthmus time: 180 msec  Local double potentials: 100 msec    Next, baseline recordings and a diagnostic EP study was performed. The coronary sinus multipolar catheter was used to pace the left atrium during the EP study. The LA CS electrograms were documented and interpreted during the procedure. A comprehensive EP study was performed with 1:1 AV decremental pacing, atrial extrastimuli and ventricular pacing to assess retrograde conduction. The patient did not have sustained slow pathway conduction or evidence of an accessory pathway. Ventricular pacing revealed no retrograde conduction. At the completion of the ablation and EPS, all catheters were removed, 100mg Protamine was administered and sheaths were pulled after placing a figure of 8 stitch. The patient tolerated the procedure well with no acute complications recognized.  The patient left the EP lab in stable condition, in normal sinus rhythm. Just prior to pulling shealths, the ICE catheter was used to obtain ultrasound images and revealed no evidence of pericardial effusion. Baseline Intervals    QRS duration: 117 msec  ME interval: 253 msec  RR interval: 939 msec  AH interval: 107 msec  HV interval: 48 msec    EP Study    AV Wenchebach: 340 msec  AV naida ERP: 340 msec  VA Wenchebach: No VA conduction    Complications: None    Summary:   1. Successful pulmonary vein isolation x4.  2. Creation of a line of bidirectional block at the cavotricuspid isthmus. 3.  LA posterior wall isolation (without full isolation of the inferior LA wall 2/2 esophageal location). 4.         Comprehensive EP study. 5. Pt tolerated the procedure well. 6. Family updated. Dav Hernandez MD, MS  Clinical Cardiac Electrophysiology  11/11/2019  11:56 AM

## 2019-11-11 NOTE — ANESTHESIA PREPROCEDURE EVALUATION
Anesthetic History     PONV          Review of Systems / Medical History  Patient summary reviewed and pertinent labs reviewed    Pulmonary        Sleep apnea           Neuro/Psych              Cardiovascular          CHF  Dysrhythmias : atrial fibrillation  Pacemaker (BiV ICD), past MI (Remote h/o slight MI w/o revascularization) and CAD    Exercise tolerance: >4 METS  Comments: Dx nonischemic cardiomyopathy Jan 2014 w subsequent placement of AICD.     Last echo EF 20%   GI/Hepatic/Renal     GERD: well controlled           Endo/Other    Diabetes: well controlled, type 2  Hypothyroidism: well controlled  Obesity     Other Findings              Physical Exam    Airway  Mallampati: II  TM Distance: 4 - 6 cm  Neck ROM: normal range of motion   Mouth opening: Normal     Cardiovascular    Rhythm: regular  Rate: normal      Pertinent negatives: No murmur and peripheral edema   Dental  No notable dental hx       Pulmonary  Breath sounds clear to auscultation               Abdominal  GI exam deferred       Other Findings            Anesthetic Plan    ASA: 3  Anesthesia type: total IV anesthesia          Induction: Intravenous  Anesthetic plan and risks discussed with: Patient

## 2019-11-11 NOTE — ANESTHESIA POSTPROCEDURE EVALUATION
Procedure(s):  ABLATION OF ATRIAL FIBRILLATION. total IV anesthesia    Anesthesia Post Evaluation      Multimodal analgesia: multimodal analgesia used between 6 hours prior to anesthesia start to PACU discharge  Patient location during evaluation: bedside  Patient participation: complete - patient participated  Level of consciousness: awake and responsive to light touch  Pain management: adequate  Airway patency: patent  Anesthetic complications: no  Cardiovascular status: acceptable, hemodynamically stable, blood pressure returned to baseline and stable  Respiratory status: acceptable, unassisted, spontaneous ventilation and nonlabored ventilation  Hydration status: acceptable  Post anesthesia nausea and vomiting:  controlled      Vitals Value Taken Time   /60 11/11/2019 12:43 PM   Temp 36.6 °C (97.9 °F) 11/11/2019 12:45 PM   Pulse 69 11/11/2019 12:50 PM   Resp     SpO2 93 % 11/11/2019 12:50 PM   Vitals shown include unvalidated device data.

## 2019-11-11 NOTE — ANESTHESIA PROCEDURE NOTES
Arterial Line Placement    Start time: 11/11/2019 10:05 AM  End time: 11/11/2019 10:15 AM  Performed by: Jenny Huddleston MD  Authorized by: Jenny Huddleston MD     Pre-Procedure  Indications:  Arterial pressure monitoring  Preanesthetic Checklist: patient identified, risks and benefits discussed, anesthesia consent, patient being monitored and patient being monitored    Timeout Time: 10:05        Procedure:   Prep:  Chlorhexidine  Seldinger Technique?: Yes    Orientation:  Left  Location:  Radial artery  Catheter size:  20 G  Number of attempts:  1  Cont Cardiac Output Sensor: Yes      Assessment:   Post-procedure:  Line secured  Patient Tolerance:  Patient tolerated the procedure well with no immediate complications  Comment:   Placed using ultrasound guidance.   Image printed and placed on chart

## 2019-11-11 NOTE — PERIOP NOTES
TRANSFER - OUT REPORT:    Verbal report given to Darion Jc on Ebbie December  being transferred to [phone] for routine post - op       Report consisted of patients Situation, Background, Assessment and   Recommendations(SBAR). Information from the following report(s) SBAR, OR Summary, Procedure Summary, MAR and Cardiac Rhythm Paced was reviewed with the receiving nurse. Lines:   Peripheral IV 11/11/19 Anterior;Proximal;Right Forearm (Active)   Site Assessment Clean, dry, & intact 11/11/2019 12:06 PM   Phlebitis Assessment 0 11/11/2019 12:06 PM   Infiltration Assessment 0 11/11/2019 12:06 PM   Dressing Status Clean, dry, & intact 11/11/2019 12:06 PM   Dressing Type Transparent;Tape 11/11/2019 12:06 PM   Hub Color/Line Status Patent; Infusing;Pink 11/11/2019 12:06 PM   Alcohol Cap Used No 11/11/2019 12:06 PM       Peripheral IV 11/11/19 Left Antecubital (Active)   Site Assessment Clean, dry, & intact 11/11/2019 12:06 PM   Phlebitis Assessment 0 11/11/2019 12:06 PM   Infiltration Assessment 0 11/11/2019 12:06 PM   Dressing Status Clean, dry, & intact 11/11/2019 12:06 PM   Dressing Type Transparent;Tape 11/11/2019 12:06 PM   Hub Color/Line Status Patent;Pink;Capped 11/11/2019 12:06 PM   Alcohol Cap Used No 11/11/2019 12:06 PM       Arterial Line 11/11/19 Left Radial artery (Active)   Site Assessment Clean, dry, & intact 11/11/2019 12:06 PM   Dressing Status Clean, dry, & intact 11/11/2019 12:06 PM   Dressing Type Transparent 11/11/2019 12:06 PM   Line Status Intact and in place 11/11/2019 12:06 PM        Opportunity for questions and clarification was provided. Patient transported with:   O2 @ 3 liters  Registered Nurse    VTE prophylaxis orders have been written for Ebbie December.

## 2019-11-11 NOTE — PROGRESS NOTES
Patient received to [address] [place] [address] room # 9  Ambulatory from [facility]. Patient scheduled for A-Fib ablation today with Dr Elvira Wiseman. Procedure reviewed & questions answered, voiced good understanding consent obtained & placed on chart. All medications and medical history reviewed. Will prep patient per orders. Patient & family updated on plan of care.       The patient has a fraility score of 3-MANAGING WELL, based on reports no recent falls

## 2019-11-12 VITALS
BODY MASS INDEX: 32.2 KG/M2 | HEART RATE: 84 BPM | OXYGEN SATURATION: 95 % | HEIGHT: 70 IN | SYSTOLIC BLOOD PRESSURE: 101 MMHG | WEIGHT: 224.9 LBS | DIASTOLIC BLOOD PRESSURE: 61 MMHG | RESPIRATION RATE: 16 BRPM | TEMPERATURE: 98.2 F

## 2019-11-12 LAB
ANION GAP SERPL CALC-SCNC: 4 MMOL/L (ref 7–16)
ATRIAL RATE: 320 BPM
BUN SERPL-MCNC: 18 MG/DL (ref 8–23)
CALCIUM SERPL-MCNC: 8.4 MG/DL (ref 8.3–10.4)
CALCULATED P AXIS, ECG09: 117 DEGREES
CALCULATED R AXIS, ECG10: 2 DEGREES
CALCULATED T AXIS, ECG11: -94 DEGREES
CHLORIDE SERPL-SCNC: 105 MMOL/L (ref 98–107)
CO2 SERPL-SCNC: 30 MMOL/L (ref 21–32)
CREAT SERPL-MCNC: 0.85 MG/DL (ref 0.6–1)
DIAGNOSIS, 93000: NORMAL
GLUCOSE SERPL-MCNC: 95 MG/DL (ref 65–100)
MAGNESIUM SERPL-MCNC: 2 MG/DL (ref 1.8–2.4)
POTASSIUM SERPL-SCNC: 3.9 MMOL/L (ref 3.5–5.1)
Q-T INTERVAL, ECG07: 432 MS
QRS DURATION, ECG06: 102 MS
QTC CALCULATION (BEZET), ECG08: 498 MS
SODIUM SERPL-SCNC: 139 MMOL/L (ref 136–145)
VENTRICULAR RATE, ECG03: 80 BPM

## 2019-11-12 PROCEDURE — 36415 COLL VENOUS BLD VENIPUNCTURE: CPT

## 2019-11-12 PROCEDURE — 74011250637 HC RX REV CODE- 250/637: Performed by: INTERNAL MEDICINE

## 2019-11-12 PROCEDURE — 80048 BASIC METABOLIC PNL TOTAL CA: CPT

## 2019-11-12 PROCEDURE — 99218 HC RM OBSERVATION: CPT

## 2019-11-12 PROCEDURE — 83735 ASSAY OF MAGNESIUM: CPT

## 2019-11-12 PROCEDURE — 90686 IIV4 VACC NO PRSV 0.5 ML IM: CPT | Performed by: INTERNAL MEDICINE

## 2019-11-12 PROCEDURE — 90471 IMMUNIZATION ADMIN: CPT

## 2019-11-12 PROCEDURE — 93005 ELECTROCARDIOGRAM TRACING: CPT | Performed by: INTERNAL MEDICINE

## 2019-11-12 PROCEDURE — 74011250636 HC RX REV CODE- 250/636: Performed by: INTERNAL MEDICINE

## 2019-11-12 RX ADMIN — FUROSEMIDE 20 MG: 20 TABLET ORAL at 07:09

## 2019-11-12 RX ADMIN — LEVOTHYROXINE SODIUM 88 MCG: 88 TABLET ORAL at 05:46

## 2019-11-12 RX ADMIN — SACUBITRIL AND VALSARTAN 1 TABLET: 24; 26 TABLET, FILM COATED ORAL at 08:16

## 2019-11-12 RX ADMIN — ACETAMINOPHEN 650 MG: 325 TABLET, FILM COATED ORAL at 07:09

## 2019-11-12 RX ADMIN — INFLUENZA VIRUS VACCINE 0.5 ML: 15; 15; 15; 15 SUSPENSION INTRAMUSCULAR at 08:17

## 2019-11-12 RX ADMIN — SUCRALFATE 1 G: 1 TABLET ORAL at 05:46

## 2019-11-12 RX ADMIN — Medication 10 ML: at 05:47

## 2019-11-12 RX ADMIN — PANTOPRAZOLE SODIUM 40 MG: 40 TABLET, DELAYED RELEASE ORAL at 07:09

## 2019-11-12 RX ADMIN — APIXABAN 5 MG: 5 TABLET, FILM COATED ORAL at 08:16

## 2019-11-12 RX ADMIN — CARVEDILOL 6.25 MG: 6.25 TABLET, FILM COATED ORAL at 08:16

## 2019-11-12 NOTE — PROGRESS NOTES
Bedside shift change report given to Hi Malagon RN (oncoming nurse) by myself (offgoing nurse). Report included the following information SBAR, Kardex, Intake/Output, MAR, Recent Results and Cardiac Rhythm Paced. Bilateral groin sites visualized with Hi Malagon RN. Sutures removed.

## 2019-11-12 NOTE — PROGRESS NOTES
Pt discharge home this day. No CM needs voiced at discharge. Milestones met.      Care Management Interventions  Transition of Care Consult (CM Consult): Discharge Planning  Discharge Location  Discharge Placement: Home

## 2019-11-12 NOTE — PROGRESS NOTES
Discharge instructions, follow up appointments and prescriptions reviewed with patient and family. Both verbalize understanding. All personal belongings taken with patient. Family member will drive patient home. Patient escorted to discharge area via wheelchair. Patient is stable at discharge. Rx given for Carafate and Protonix. Monitor and PIV removed.

## 2019-11-12 NOTE — PROGRESS NOTES
Bedside shift change report given to myself (oncoming nurse) by Jose Alfredo Hernandez RN (offgoing nurse). Report included the following information SBAR, Kardex, Procedure Summary, Intake/Output, MAR, Recent Results and Cardiac Rhythm Paced. Bilateral groin sites visualized with Jose Alfredo Hernandez RN-C/d/i, no hematoma, and no bruising noted.

## 2019-11-12 NOTE — DISCHARGE SUMMARY
[facility] Discharge Summary     Patient ID:  Yuliya Adams  [id number]  75 y.o.  1955    Admit date: 11/11/2019    Discharge date:  11/12/2019    Admitting Physician: Rickie Mac MD     Discharge Physician: Caroline Samayoa NP/Dr. Maine Flores    Admission Diagnoses: Atrial fibrillation, unspecified type ([address] [I48.91]  A-fib [facility]) [I48.91]    Discharge Diagnoses:    Diagnosis    Persistent atrial fibrillation    Hyperlipidemia    BENNY (obstructive sleep apnea)    Biventricular automatic implantable cardioverter defibrillator in situ    Chronic systolic CHF (congestive heart failure) [facility])       Cardiology Procedures this admission:  AFIB ablation  Consults: None    Hospital Course: Patient was seen at the office of [facility] by Dr. Jessica Fitzgerald for management of AFIB and was subsequently scheduled for an AM Admission ablation at [facility] on 11/11/19. Patient tolerated the procedure well and was taken to telemetry for recovery. The morning of discharge, patient was up feeling well without any complaints of chest pain or shortness of breath. Patient's bilateral cath sites were clean, dry and intact without hematoma or bruit. Patient's labs were WNL. Patient was seen and examined by Dr. Maine Flores and determined stable and ready for discharge. Patient was instructed on the importance of medication compliance including taking carafate, PPI and Eliquis everyday without missing a dose. The patient will follow up with [facility] -- Dr. Jessica Fitzgerald on 12/5/19 at 4:15pm in the [place] office. DISPOSITION: The patient is being discharged home in stable condition on a low saturated fat, low cholesterol and low salt diet. The patient is instructed to advance activities as tolerated to the limit of fatigue or shortness of breath. The patient is instructed to avoid all heavy lifting, straining, stooping or squatting for 3-5 days.  The patient is instructed to watch the cath site for bleeding/oozing; if seen, the patient is instructed to apply firm pressure with a clean cloth and call [facility] at [phone]. The patient is instructed to watch for signs of infection which include: increasing area of redness, fever/hot to touch or purulent drainage at the catheterization site. The patient is instructed not to soak in a bathtub for 7-10 days, but is cleared to shower. Discharge Exam:Patient has been seen by Dr. Huber Almazan: see his progress note for exam details. Visit Vitals  /61   Pulse 84   Temp 98.2 °F (36.8 °C)   Resp 16   Ht 5' 10\" (1.778 m)   Wt 224 lb 14.4 oz (102 kg)   SpO2 95%   Breastfeeding? No   BMI 32.27 kg/m²         Recent Results (from the past 24 hour(s))   POC ACTIVATED CLOTTING TIME    Collection Time: 11/11/19 11:08 AM   Result Value Ref Range    Activated Clotting Time (POC) 318 (H) 70 - 128 SECS   POC ACTIVATED CLOTTING TIME    Collection Time: 11/11/19 11:41 AM   Result Value Ref Range    Activated Clotting Time (POC) 323 (H) 70 - 128 SECS   EKG, 12 LEAD, INITIAL    Collection Time: 11/11/19 12:39 PM   Result Value Ref Range    Ventricular Rate 70 BPM    Atrial Rate 70 BPM    P-R Interval 204 ms    QRS Duration 108 ms    Q-T Interval 464 ms    QTC Calculation (Bezet) 501 ms    Calculated P Axis 71 degrees    Calculated R Axis -33 degrees    Calculated T Axis -71 degrees    Diagnosis       AV dual-paced rhythm with occasional supraventricular complexes  Abnormal ECG  When compared with ECG of 11-NOV-2019 08:36,  Vent.  rate has decreased BY  10 BPM  Confirmed by [facility]  MD ()ELDER ([id number]) on 11/11/2019 7:55:11 PM     METABOLIC PANEL, BASIC    Collection Time: 11/12/19  3:33 AM   Result Value Ref Range    Sodium 139 136 - 145 mmol/L    Potassium 3.9 3.5 - 5.1 mmol/L    Chloride 105 98 - 107 mmol/L    CO2 30 21 - 32 mmol/L    Anion gap 4 (L) 7 - 16 mmol/L    Glucose 95 65 - 100 mg/dL    BUN 18 8 - 23 MG/DL Creatinine 0.85 0.6 - 1.0 MG/DL    GFR est AA >60 >60 ml/min/1.73m2    GFR est non-AA >60 >60 ml/min/1.73m2    Calcium 8.4 8.3 - 10.4 MG/DL   MAGNESIUM    Collection Time: 11/12/19  3:33 AM   Result Value Ref Range    Magnesium 2.0 1.8 - 2.4 mg/dL   EKG, 12 LEAD, INITIAL    Collection Time: 11/12/19  7:11 AM   Result Value Ref Range    Ventricular Rate 80 BPM    Atrial Rate 320 BPM    QRS Duration 102 ms    Q-T Interval 432 ms    QTC Calculation (Bezet) 498 ms    Calculated P Axis 117 degrees    Calculated R Axis 2 degrees    Calculated T Axis -94 degrees    Diagnosis       Ventricular-paced rhythm  Abnormal ECG  When compared with ECG of 11-NOV-2019 12:39,  atrial flutter replaced AV pacing   Confirmed by NHUNG ROMERO (), Vincenzo Hardwick ([id number]) on 11/12/2019 7:59:38 AM           Patient Instructions:   Current Discharge Medication List      START taking these medications    Details   pantoprazole (PROTONIX) 40 mg tablet Take 1 Tab by mouth every twelve (12) hours. Qty: 60 Tab, Refills: 0      sucralfate (CARAFATE) 1 gram tablet Take 1 Tab by mouth Before breakfast, lunch, dinner and at bedtime. Qty: 120 Tab, Refills: 0         CONTINUE these medications which have NOT CHANGED    Details   sacubitril-valsartan (ENTRESTO) 24 mg/26 mg tablet Take 1 Tab by mouth two (2) times a day. Qty: 60 Tab, Refills: 11      apixaban (ELIQUIS) 5 mg tablet Take 1 Tab by mouth two (2) times a day. Qty: 60 Tab, Refills: 11      atorvastatin (LIPITOR) 20 mg tablet Take 1 Tab by mouth daily. Qty: 30 Tab, Refills: 11      clonazePAM (KLONOPIN) 0.5 mg tablet Take 1 Tab by mouth nightly as needed for Other (sleep). Max Daily Amount: 0.5 mg.  Qty: 30 Tab, Refills: 3    Associated Diagnoses: BENNY (obstructive sleep apnea); Persistent disorder of initiating or maintaining sleep      levothyroxine (SYNTHROID) 88 mcg tablet Take  by mouth Daily (before breakfast).       carvedilol (COREG) 6.25 mg tablet Take 1 Tab by mouth two (2) times a day.  Qty: 60 Tab, Refills: 11      metFORMIN (GLUCOPHAGE) 500 mg tablet Take 500 mg by mouth nightly. cpap machine kit by Does Not Apply route. 9 cm      omeprazole (PRILOSEC) 20 mg capsule Take 20 mg by mouth daily. furosemide (LASIX) 20 mg tablet Take 1 Tab by mouth two (2) times daily as needed (swelling). Take 1 to 2 tabs daily as needed.   Qty: 60 Tab, Refills: 5               Signed:  Delmi Fernandes NP  11/12/2019  11:27 PM

## 2019-11-12 NOTE — PROGRESS NOTES
Problem: Falls - Risk of  Goal: *Absence of Falls  Description  Document Tierra Meggett Fall Risk and appropriate interventions in the flowsheet.   Outcome: Progressing Towards Goal  Note:   Fall Risk Interventions:    Medication Interventions: Patient to call before getting OOB, Teach patient to arise slowly    Elimination Interventions: Call light in reach, Patient to call for help with toileting needs

## 2019-11-12 NOTE — DISCHARGE INSTRUCTIONS
Patient Education      Electrophysiology Study and Catheter Ablation: What to Expect at [address] [place] [address] had an electrophysiology study for a problem with your heartbeat. You may also have had a catheter ablation to try to correct the problem. You may have swelling, bruising, or a small lump around the site where the catheters went into your body. These should go away in 3 to 4 weeks. Do not exercise hard or lift anything heavy for a week. You may be able to go back to work and to your normal routine in 1 or 2 days. This care sheet gives you a general idea about how long it will take for you to recover. But each person recovers at a different pace. Follow the steps below to get better as quickly as possible. How can you care for yourself at home? Activity    · For 1 week, do not lift anything that would make you strain. This may include heavy grocery bags and milk containers, a heavy briefcase or backpack, cat litter or dog food bags, a vacuum , or a child.     · For 1 week, do not exercise hard or do any activity that could strain your blood vessels or the site where the catheters went into your body.     · Ask your doctor when it is okay to have sex. Diet    · You can eat your normal diet. If your stomach is upset, try bland, low-fat foods like plain rice, broiled chicken, toast, and yogurt.     · Drink plenty of fluids (unless your doctor tells you not to). Medicines    · Your doctor will tell you if and when you can restart your medicines. He or she will also give you instructions about taking any new medicines.     · If you take blood thinners, such as warfarin (Coumadin), clopidogrel (Plavix), or aspirin, be sure to talk to your doctor. He or she will tell you if and when to start taking those medicines again. Make sure that you understand exactly what your doctor wants you to do.     · Ask your doctor if you can take acetaminophen (Tylenol) for pain.  Do not take aspirin for 3 days, unless your doctor says it is okay.     · Check with your doctor before you take aspirin or anti-inflammatory medicines to reduce pain and swelling. These include ibuprofen (Advil, Motrin) and naproxen (Aleve).   · Make sure you know which heart medicines to continue and which ones to stop. Ask your doctor if you are not sure.   Carlos Lav site care    · You can remove your bandages the day after the procedure.     · You may shower 24 to 48 hours after the procedure, if your doctor okays it. Pat the incision dry.     · Do not soak the catheter site until it is healed. Don't take a bath for 1 week, or until your doctor tells you it is okay.     · Watch for bleeding from the site. A small amount of blood (up to the size of a quarter) on the bandage can be normal.     · If you are bleeding, lie down and press on the area for 15 minutes to try to make it stop. If the bleeding does not stop, call your doctor or seek immediate medical care. Follow-up care is a key part of your treatment and safety. Be sure to make and go to all appointments, and call your doctor if you are having problems. It's also a good idea to know your test results and keep a list of the medicines you take. When should you call for help? Call  911 anytime you think you may need emergency care. For example, call if:    · You passed out (lost consciousness).     · You have symptoms of a heart attack. These may include:  ? Chest pain or pressure, or a strange feeling in the chest.  ? Sweating. ? Shortness of breath. ? Nausea or vomiting. ? Pain, pressure, or a strange feeling in the back, neck, jaw, or upper belly, or in one or both shoulders or arms. ? Lightheadedness or sudden weakness. ? A fast or irregular heartbeat. After you call 911, the  may tell you to chew 1 adult-strength or 2 to 4 low-dose aspirin. Wait for an ambulance. Do not try to drive yourself.     · You have symptoms of a stroke.  These may include:  ? Sudden numbness, tingling, weakness, or loss of movement in your face, arm, or leg, especially on only one side of your body. ? Sudden vision changes. ? Sudden trouble speaking. ? Sudden confusion or trouble understanding simple statements. ? Sudden problems with walking or balance. ? A sudden, severe headache that is different from past headaches.    Call your doctor now or seek immediate medical care if:    · You are bleeding from the area where the catheter was put in your blood vessel.     · You have a fast-growing, painful lump at the catheter site.     · You have signs of infection, such as:  ? Increased pain, swelling, warmth, or redness. ? Red streaks leading from the catheter site. ? Pus draining from the catheter site. ? A fever.     · Your leg, arm, or hand is painful, looks blue, or feels cold, numb, or tingly.    Watch closely for any changes in your health, and be sure to contact your doctor if you have any problems. Where can you learn more? Go to http://jake-joão.info/. Enter [phone] in the search box to learn more about \"Electrophysiology Study and Catheter Ablation: What to Expect at Home. \"  Current as of: April 9, 2019  Content Version: 12.2  © 2761-1299 [employer]. Care instructions adapted under license by [employer] (which disclaims liability or warranty for this information). If you have questions about a medical condition or this instruction, always ask your healthcare professional. [place] [address] any warranty or liability for your use of this information.          DISCHARGE SUMMARY from Nurse    PATIENT INSTRUCTIONS:    After general anesthesia or intravenous sedation, for 24 hours or while taking prescription Narcotics:  · Limit your activities  · Do not drive and operate hazardous machinery  · Do not make important personal or business decisions  · Do  not drink alcoholic beverages  · If you have not urinated within 8 hours after discharge, please contact your surgeon on call. Report the following to your surgeon:  · Excessive pain, swelling, redness or odor of or around the surgical area  · Temperature over 100.5  · Nausea and vomiting lasting longer than 4 hours or if unable to take medications  · Any signs of decreased circulation or nerve impairment to extremity: change in color, persistent  numbness, tingling, coldness or increase pain  · Any questions    What to do at Home:  Recommended activity: Activity as tolerated. The patient is being discharged home in stable condition on a low saturated fat, low cholesterol and low salt diet. The patient is instructed to advance activities as tolerated to the limit of fatigue or shortness of breath. The patient is instructed to avoid all heavy lifting, straining, stooping or squatting for 3-5 days. The patient is instructed to watch the cath site for bleeding/oozing; if seen, the patient is instructed to apply firm pressure with a clean cloth and call [address] Cardiology at [phone]. The patient is instructed to watch for signs of infection which include: increasing area of redness, fever/hot to touch or purulent drainage at the catheterization site. The patient is instructed not to soak in a bathtub for 7-10 days, but is cleared to shower.      *  Please give a list of your current medications to your Primary Care Provider. *  Please update this list whenever your medications are discontinued, doses are      changed, or new medications (including over-the-counter products) are added. *  Please carry medication information at all times in case of emergency situations. These are general instructions for a healthy lifestyle:    No smoking/ No tobacco products/ Avoid exposure to second hand smoke  Surgeon General's Warning:  Quitting smoking now greatly reduces serious risk to your health.     Obesity, smoking, and sedentary lifestyle greatly increases your risk for illness    A healthy diet, regular physical exercise & weight monitoring are important for maintaining a healthy lifestyle    You may be retaining fluid if you have a history of heart failure or if you experience any of the following symptoms:  Weight gain of 3 pounds or more overnight or 5 pounds in a week, increased swelling in our hands or feet or shortness of breath while lying flat in bed. Please call your doctor as soon as you notice any of these symptoms; do not wait until your next office visit. The discharge information has been reviewed with the {PATIENT PARENT GUARDIAN:30610}. The {PATIENT PARENT GUARDIAN:35179} verbalized understanding. Discharge medications reviewed with the {Dishcarge meds reviewed RTOF:04109} and appropriate educational materials and side effects teaching were provided.   ___________________________________________________________________________________________________________________________________

## 2019-11-12 NOTE — PROGRESS NOTES
Initial visit by  to convey care and concern and encourage patient that  services are available if desired. Ms. Tree Zayas was not in the room.   Ronaldo Flood 66  Board Certified

## 2019-11-12 NOTE — PROGRESS NOTES
[facility] CARDIOLOGY PROGRESS NOTE           11/12/2019 7:24 AM    Admit Date: 11/11/2019    Admit Diagnosis: Atrial fibrillation, unspecified type ([facility] [address] [I48.91]; A-fib [facility]) [I48.91]    Assessment:   Active Problems:    A-fib ([facility] [address] (11/11/2019)      Plan:   1. AF - s/p ablation. Successful procedure with no obvious complications. Will continue Eliquis and medical therapy. 2. Dispo - anticipate today. Thank you for allowing me to participate in the electrophysiologic care of this most pleasant patient. Please feel free to contact me if there are any questions or concerns. Rosa Segovia. MD Mao, MS  Clinical Cardiac Electrophysiology  [facility] Cardiology    Subjective:   No complaints this AM, no chest pain or shortness of breath    Interval History: (History of pertinent interval events obtained from nursing staff)    ROS:  GEN:  No fever or chills  Cardiovascular:  As noted above  Pulmonary:  As noted above  Neuro:  No new focal motor or sensory loss      Objective:     Vitals:    11/11/19 2111 11/11/19 2304 11/12/19 0304 11/12/19 0659   BP: 117/68 113/60 101/56 101/61   Pulse: 75 79 69 84   Resp:  18 18 16   Temp:  98.1 °F (36.7 °C) 97.4 °F (36.3 °C) 98.2 °F (36.8 °C)   SpO2:  95% 93% (!) 88%   Weight:   224 lb 14.4 oz (102 kg)    Height:           Physical Exam:  Terence Horn, Well Nourished, No Acute Distress, Alert & Oriented x 3, appropriate mood. Neck- supple, no JVD  CV- regular rate and rhythm no MRG  Lung- clear bilaterally  Abd- soft, nontender, nondistended  Ext- no edema bilaterally.   Skin- warm and dry    Current Facility-Administered Medications   Medication Dose Route Frequency    apixaban (ELIQUIS) tablet 5 mg  5 mg Oral BID    atorvastatin (LIPITOR) tablet 20 mg  20 mg Oral DAILY    carvedilol (COREG) tablet 6.25 mg  6.25 mg Oral BID WITH MEALS    clonazePAM (KlonoPIN) tablet 0.5 mg  0.5 mg Oral QHS PRN    furosemide (LASIX) tablet 20 mg  20 mg Oral BID PRN    levothyroxine (SYNTHROID) tablet 88 mcg  88 mcg Oral ACB    metFORMIN (GLUCOPHAGE) tablet 500 mg  500 mg Oral QHS    sacubitril-valsartan (ENTRESTO) 24-26 mg tablet 1 Tab  1 Tab Oral BID    sodium chloride (NS) flush 5-40 mL  5-40 mL IntraVENous Q8H    sodium chloride (NS) flush 5-40 mL  5-40 mL IntraVENous PRN    acetaminophen (TYLENOL) tablet 650 mg  650 mg Oral Q4H PRN    HYDROcodone-acetaminophen (NORCO) 5-325 mg per tablet 1 Tab  1 Tab Oral Q4H PRN    sucralfate (CARAFATE) tablet 1 g  1 g Oral AC&HS    pantoprazole (PROTONIX) tablet 40 mg  40 mg Oral Q12H    influenza vaccine 2019-20 (6 mos+)(PF) (FLUARIX/FLULAVAL/FLUZONE QUAD) injection 0.5 mL  0.5 mL IntraMUSCular PRIOR TO DISCHARGE       Data Review:   Recent Results (from the past 24 hour(s))   CBC W/O DIFF    Collection Time: 11/11/19  8:34 AM   Result Value Ref Range    WBC 6.1 4.3 - 11.1 K/uL    RBC 4.17 4.05 - 5.2 M/uL    HGB 12.7 11.7 - 15.4 g/dL    HCT 39.4 35.8 - 46.3 %    MCV 94.5 79.6 - 97.8 FL    MCH 30.5 26.1 - 32.9 PG    MCHC 32.2 31.4 - 35.0 g/dL    RDW 13.1 11.9 - 14.6 %    PLATELET 964 050 - 052 K/uL    MPV 10.7 9.4 - 12.3 FL    ABSOLUTE NRBC 0.00 0.0 - 0.2 K/uL   MAGNESIUM    Collection Time: 11/11/19  8:34 AM   Result Value Ref Range    Magnesium 2.0 1.8 - 2.4 mg/dL   METABOLIC PANEL, BASIC    Collection Time: 11/11/19  8:34 AM   Result Value Ref Range    Sodium 139 136 - 145 mmol/L    Potassium 4.0 3.5 - 5.1 mmol/L    Chloride 106 98 - 107 mmol/L    CO2 28 21 - 32 mmol/L    Anion gap 5 (L) 7 - 16 mmol/L    Glucose 111 (H) 65 - 100 mg/dL    BUN 26 (H) 8 - 23 MG/DL    Creatinine 1.07 (H) 0.6 - 1.0 MG/DL    GFR est AA >60 >60 ml/min/1.73m2    GFR est non-AA 55 (L) >60 ml/min/1.73m2    Calcium 9.0 8.3 - 10.4 MG/DL   PROTHROMBIN TIME + INR    Collection Time: 11/11/19  8:34 AM   Result Value Ref Range    Prothrombin time 13.8 11.7 - 14.5 sec    INR 1.0     EKG, 12 LEAD, INITIAL    Collection Time: 11/11/19  8:36 AM   Result Value Ref Range    Ventricular Rate 80 BPM    Atrial Rate 87 BPM    QRS Duration 180 ms    Q-T Interval 500 ms    QTC Calculation (Bezet) 576 ms    Calculated R Axis -75 degrees    Calculated T Axis 103 degrees    Diagnosis       Ventricular-paced rhythm  Abnormal ECG  When compared with ECG of 09-DEC-2016 16:18,  Vent. rate has increased BY  10 BPM  Confirmed by [facility]  MD ()ELDER ([id number]) on 11/11/2019 10:27:11 AM     POC ACTIVATED CLOTTING TIME    Collection Time: 11/11/19 11:08 AM   Result Value Ref Range    Activated Clotting Time (POC) 318 (H) 70 - 128 SECS   POC ACTIVATED CLOTTING TIME    Collection Time: 11/11/19 11:41 AM   Result Value Ref Range    Activated Clotting Time (POC) 323 (H) 70 - 128 SECS   EKG, 12 LEAD, INITIAL    Collection Time: 11/11/19 12:39 PM   Result Value Ref Range    Ventricular Rate 70 BPM    Atrial Rate 70 BPM    P-R Interval 204 ms    QRS Duration 108 ms    Q-T Interval 464 ms    QTC Calculation (Bezet) 501 ms    Calculated P Axis 71 degrees    Calculated R Axis -33 degrees    Calculated T Axis -71 degrees    Diagnosis       AV dual-paced rhythm with occasional supraventricular complexes  Abnormal ECG  When compared with ECG of 11-NOV-2019 08:36,  Vent.  rate has decreased BY  10 BPM  Confirmed by [facility]  MD ()ELDER ([id number]) on 11/11/2019 6:05:66 PM     METABOLIC PANEL, BASIC    Collection Time: 11/12/19  3:33 AM   Result Value Ref Range    Sodium 139 136 - 145 mmol/L    Potassium 3.9 3.5 - 5.1 mmol/L    Chloride 105 98 - 107 mmol/L    CO2 30 21 - 32 mmol/L    Anion gap 4 (L) 7 - 16 mmol/L    Glucose 95 65 - 100 mg/dL    BUN 18 8 - 23 MG/DL    Creatinine 0.85 0.6 - 1.0 MG/DL    GFR est AA >60 >60 ml/min/1.73m2    GFR est non-AA >60 >60 ml/min/1.73m2    Calcium 8.4 8.3 - 10.4 MG/DL   MAGNESIUM    Collection Time: 11/12/19  3:33 AM   Result Value Ref Range    Magnesium 2.0 1.8 - 2.4 mg/dL       EKG:  (EKG has been independently visualized by me with interpretation below): AV paced.

## 2019-12-11 ENCOUNTER — HOSPITAL ENCOUNTER (INPATIENT)
Age: 64
LOS: 4 days | Discharge: HOME | DRG: 309 | End: 2019-12-15
Attending: INTERNAL MEDICINE | Admitting: INTERNAL MEDICINE
Payer: MEDICARE

## 2019-12-11 PROBLEM — I48.0 PAF (PAROXYSMAL ATRIAL FIBRILLATION) (HCC): Status: ACTIVE | Noted: 2019-12-11

## 2019-12-11 LAB
ALBUMIN SERPL-MCNC: 3.5 G/DL (ref 3.2–4.6)
ALBUMIN/GLOB SERPL: 1 {RATIO} (ref 1.2–3.5)
ALP SERPL-CCNC: 97 U/L (ref 50–136)
ALT SERPL-CCNC: 18 U/L (ref 12–65)
ANION GAP SERPL CALC-SCNC: 4 MMOL/L (ref 7–16)
AST SERPL-CCNC: 14 U/L (ref 15–37)
ATRIAL RATE: 83 BPM
BILIRUB SERPL-MCNC: 0.7 MG/DL (ref 0.2–1.1)
BUN SERPL-MCNC: 16 MG/DL (ref 8–23)
CALCIUM SERPL-MCNC: 8.3 MG/DL (ref 8.3–10.4)
CALCULATED R AXIS, ECG10: -61 DEGREES
CALCULATED T AXIS, ECG11: 104 DEGREES
CHLORIDE SERPL-SCNC: 106 MMOL/L (ref 98–107)
CO2 SERPL-SCNC: 31 MMOL/L (ref 21–32)
CREAT SERPL-MCNC: 0.98 MG/DL (ref 0.6–1)
DIAGNOSIS, 93000: NORMAL
ERYTHROCYTE [DISTWIDTH] IN BLOOD BY AUTOMATED COUNT: 12.7 % (ref 11.9–14.6)
GLOBULIN SER CALC-MCNC: 3.5 G/DL (ref 2.3–3.5)
GLUCOSE SERPL-MCNC: 84 MG/DL (ref 65–100)
HCT VFR BLD AUTO: 37.3 % (ref 35.8–46.3)
HGB BLD-MCNC: 12 G/DL (ref 11.7–15.4)
MAGNESIUM SERPL-MCNC: 2 MG/DL (ref 1.8–2.4)
MCH RBC QN AUTO: 30.3 PG (ref 26.1–32.9)
MCHC RBC AUTO-ENTMCNC: 32.2 G/DL (ref 31.4–35)
MCV RBC AUTO: 94.2 FL (ref 79.6–97.8)
NRBC # BLD: 0 K/UL (ref 0–0.2)
PLATELET # BLD AUTO: 185 K/UL (ref 150–450)
PMV BLD AUTO: 10.9 FL (ref 9.4–12.3)
POTASSIUM SERPL-SCNC: 3.5 MMOL/L (ref 3.5–5.1)
PROT SERPL-MCNC: 7 G/DL (ref 6.3–8.2)
Q-T INTERVAL, ECG07: 462 MS
QRS DURATION, ECG06: 134 MS
QTC CALCULATION (BEZET), ECG08: 539 MS
RBC # BLD AUTO: 3.96 M/UL (ref 4.05–5.2)
SODIUM SERPL-SCNC: 141 MMOL/L (ref 136–145)
VENTRICULAR RATE, ECG03: 82 BPM
WBC # BLD AUTO: 6.4 K/UL (ref 4.3–11.1)

## 2019-12-11 PROCEDURE — 93005 ELECTROCARDIOGRAM TRACING: CPT | Performed by: NURSE PRACTITIONER

## 2019-12-11 PROCEDURE — 65660000000 HC RM CCU STEPDOWN

## 2019-12-11 PROCEDURE — 80053 COMPREHEN METABOLIC PANEL: CPT

## 2019-12-11 PROCEDURE — 85027 COMPLETE CBC AUTOMATED: CPT

## 2019-12-11 PROCEDURE — 74011250637 HC RX REV CODE- 250/637: Performed by: NURSE PRACTITIONER

## 2019-12-11 PROCEDURE — 83735 ASSAY OF MAGNESIUM: CPT

## 2019-12-11 RX ORDER — LEVOTHYROXINE SODIUM 88 UG/1
88 TABLET ORAL
Status: DISCONTINUED | OUTPATIENT
Start: 2019-12-12 | End: 2019-12-15 | Stop reason: HOSPADM

## 2019-12-11 RX ORDER — POTASSIUM CHLORIDE 20 MEQ/1
40 TABLET, EXTENDED RELEASE ORAL
Status: COMPLETED | OUTPATIENT
Start: 2019-12-11 | End: 2019-12-11

## 2019-12-11 RX ORDER — SODIUM CHLORIDE 0.9 % (FLUSH) 0.9 %
5-40 SYRINGE (ML) INJECTION EVERY 8 HOURS
Status: DISCONTINUED | OUTPATIENT
Start: 2019-12-11 | End: 2019-12-15 | Stop reason: HOSPADM

## 2019-12-11 RX ORDER — SODIUM CHLORIDE 0.9 % (FLUSH) 0.9 %
5-40 SYRINGE (ML) INJECTION AS NEEDED
Status: DISCONTINUED | OUTPATIENT
Start: 2019-12-11 | End: 2019-12-15 | Stop reason: HOSPADM

## 2019-12-11 RX ORDER — CARVEDILOL 6.25 MG/1
6.25 TABLET ORAL 2 TIMES DAILY
Status: DISCONTINUED | OUTPATIENT
Start: 2019-12-11 | End: 2019-12-11

## 2019-12-11 RX ORDER — ACETAMINOPHEN 325 MG/1
650 TABLET ORAL
Status: DISCONTINUED | OUTPATIENT
Start: 2019-12-11 | End: 2019-12-15 | Stop reason: HOSPADM

## 2019-12-11 RX ORDER — CARVEDILOL 3.12 MG/1
3.12 TABLET ORAL 2 TIMES DAILY
Status: DISCONTINUED | OUTPATIENT
Start: 2019-12-11 | End: 2019-12-11

## 2019-12-11 RX ORDER — PANTOPRAZOLE SODIUM 40 MG/1
40 TABLET, DELAYED RELEASE ORAL
Status: DISCONTINUED | OUTPATIENT
Start: 2019-12-12 | End: 2019-12-15 | Stop reason: HOSPADM

## 2019-12-11 RX ORDER — ATORVASTATIN CALCIUM 10 MG/1
20 TABLET, FILM COATED ORAL DAILY
Status: DISCONTINUED | OUTPATIENT
Start: 2019-12-12 | End: 2019-12-15 | Stop reason: HOSPADM

## 2019-12-11 RX ORDER — SOTALOL HYDROCHLORIDE 80 MG/1
160 TABLET ORAL EVERY 12 HOURS
Status: DISCONTINUED | OUTPATIENT
Start: 2019-12-11 | End: 2019-12-14

## 2019-12-11 RX ORDER — METFORMIN HYDROCHLORIDE 500 MG/1
500 TABLET ORAL
Status: DISCONTINUED | OUTPATIENT
Start: 2019-12-11 | End: 2019-12-15 | Stop reason: HOSPADM

## 2019-12-11 RX ADMIN — SACUBITRIL AND VALSARTAN 1 TABLET: 24; 26 TABLET, FILM COATED ORAL at 18:08

## 2019-12-11 RX ADMIN — Medication 10 ML: at 15:12

## 2019-12-11 RX ADMIN — POTASSIUM CHLORIDE 40 MEQ: 20 TABLET, EXTENDED RELEASE ORAL at 18:07

## 2019-12-11 RX ADMIN — APIXABAN 5 MG: 5 TABLET, FILM COATED ORAL at 21:42

## 2019-12-11 RX ADMIN — Medication 10 ML: at 21:43

## 2019-12-11 RX ADMIN — METFORMIN HYDROCHLORIDE 500 MG: 500 TABLET ORAL at 21:42

## 2019-12-11 RX ADMIN — SOTALOL HYDROCHLORIDE 160 MG: 80 TABLET ORAL at 18:08

## 2019-12-11 NOTE — PROGRESS NOTES
Patient a direct admit from home. Arrived to unit and placed on cardiac monitor. DUal Rn skin assessment reveals as follows:     12/11/19 2104   Dual Skin Pressure Injury Assessment   Dual Skin Pressure Injury Assessment WDL   Second Care Provider (Based on [address] [place] [address]) WENDY Spencer   Skin Integumentary   Skin Integumentary (WDL) WDL    Pressure  Injury Documentation No Pressure Injury Noted-Pressure Ulcer Prevention Initiated   Skin Color Appropriate for ethnicity   Skin Condition/Temp Warm;Dry   Skin Integrity Intact   Varicosities Absent   Skin warm, dry and intact. Sacrum and heels visualized with no noted skin breakdown. No other skin issues noted.

## 2019-12-11 NOTE — H&P
[]Hide copied text    []Adam for details    [address] [place] [address]  [address], [address]  PHONE: [phone]  1955     SUBJECTIVE:   Rafi Oviedo is a 75 y.o. female seen for a follow up visit regarding the following:           Chief Complaint   Patient presents with    Irregular Heart Beat       hospital follow up         HPI:    Rafi Oviedo is a very pleasant 75 y.o. female with a past medical and cardiac history significant for as noted below and presents today for follow up of recent echo revealing Ef 20% which is down from last echo. Pt overall feels well, she is relatively well compensated, denies chest pain, worsening shortness of breath, orthopnea, PND, leg swelling, passing out or near passing out spells, palpitations, fast or irregular heart rates.      Pt presents back today to discuss last interrogation. She has had worsening AF burden and decreasing BiV pacing. She continues to feel fatigued and some worsening GONZALEZ.     Pt presents back s/p afib ablation, she has reverted back into afib, feels well.      Cardiac PMH: (Old records have been reviewed and summarized below)  TTE (7/24/19): EF 20%, severe LAE     1.  Chronic systolic heart failure diagnosed in January 2014. 2. Chryl Adrien that showed possible small embolic coronary artery embolism but overall diagnosed with a nonischemic dilated cardiomyopathy. 3.  [employer] ICD implantation. 4.  History of thrombus in the left atrium and left atrial appendage. 6.  Replaced Atrial Lead - June 2015  7. [facility] - Dec 2015 - EF 15-20%, RVSP 56mmHg  8.  [facility] - Feb 2016 - MADELYN Thrombus seen  9.  [facility] - March 2016- MADELYN Thrombus seen  10. REY[facility]O - April 2016 - CV  11. CV - May 2016 - Able to restore NSR  12. ECHO - July 2016 - EF 25-30%  13. ECHO - Nov 2016 - EF 30-35%, RVSP 31mmHg, LV (d) 5.12CM  14. Upgrade to a [employer] biventricular ICD - Dec 2016  15.  ECHO - April 2017 - EF 20%, No MR or TR Seen  16. ECHO - Sept 2017 - EF 40%     Past Medical History, Past Surgical History, Family history, Social History, and Medications were all reviewed with the patient today and updated as necessary.             Current Outpatient Medications   Medication Sig Dispense Refill    furosemide (LASIX) 20 mg tablet Take 1 Tab by mouth three (3) times daily as needed (swelling). 90 Tab 5    sucralfate (CARAFATE) 1 gram tablet Take 1 Tab by mouth Before breakfast, lunch, dinner and at bedtime. 120 Tab 0    pantoprazole (PROTONIX) 40 mg tablet Take 1 Tab by mouth every twelve (12) hours. 60 Tab 0    sacubitril-valsartan (ENTRESTO) 24 mg/26 mg tablet Take 1 Tab by mouth two (2) times a day. 60 Tab 11    apixaban (ELIQUIS) 5 mg tablet Take 1 Tab by mouth two (2) times a day. 60 Tab 11    atorvastatin (LIPITOR) 20 mg tablet Take 1 Tab by mouth daily. 30 Tab 11    clonazePAM (KLONOPIN) 0.5 mg tablet Take 1 Tab by mouth nightly as needed for Other (sleep). Max Daily Amount: 0.5 mg. 30 Tab 3    levothyroxine (SYNTHROID) 88 mcg tablet Take  by mouth Daily (before breakfast).        carvedilol (COREG) 6.25 mg tablet Take 1 Tab by mouth two (2) times a day. 60 Tab 11    metFORMIN (GLUCOPHAGE) 500 mg tablet Take 500 mg by mouth nightly.        cpap machine kit by Does Not Apply route. 9 cm        omeprazole (PRILOSEC) 20 mg capsule Take 20 mg by mouth daily.          No Known Allergies       Patient Active Problem List     Diagnosis    [facility] [address]    Persistent disorder of initiating or maintaining sleep    Persistent atrial fibrillation    Left atrial thrombus    Hyperlipidemia    BENNY (obstructive sleep apnea)    Biventricular automatic implantable cardioverter defibrillator in situ       1.  Upgrade to a [employer] biventricular ICD - Dec 2016       Chronic systolic CHF (congestive heart failure) (HCC)           Past Medical History:   Diagnosis Date    Arrhythmia      Atrial fibrillation ([address] 3/10/2016     cardioversion 4/2016    CAD (coronary artery disease)       pt states she had a \"mild heart attack\". no PCI    Chronic systolic CHF (congestive heart failure) ([facility] [address] 6/24/2015    Diabetes ([facility] [address]       pre-diabetic    GERD (gastroesophageal reflux disease)      Heart failure (HCC)      Hyperlipidemia 3/10/2016    ICD (implantable cardioverter-defibrillator) lead failure 6/24/2015    Insomnia w/ sleep apnea 1/11/2016    Nausea & vomiting      Nocturnal hypoxemia 1/11/2016    BENNY (obstructive sleep apnea) 1/11/2016     cpap    Sleep apnea      Thyroid disease              Past Surgical History:   Procedure Laterality Date    CARDIAC SURG PROCEDURE UNLIST         [facility] - Cardioversion 5/16     HX HEENT         partial thyroidectomy    HX PACEMAKER         ICD            Family History   Problem Relation Age of Onset    Hypertension Mother      Cancer Mother           pancreatic    Dementia Father        Social History            Tobacco Use    Smoking status: Never Smoker    Smokeless tobacco: Never Used   Substance Use Topics    Alcohol use: Yes       Comment: socially         ROS:  A comprehensive review of systems was performed with the pertinent positives and negatives as noted in the HPI.  All other systems were reviewed and are negative     PHYSICAL EXAM:     Visit Vitals  BP 90/60   Ht 5' 10\" (1.778 m)   Wt 218 lb (98.9 kg)   BMI 31.28 kg/m²             Wt Readings from Last 5 Encounters:   12/05/19 218 lb (98.9 kg)   11/12/19 224 lb 14.4 oz (102 kg)   10/18/19 226 lb (102.5 kg)   09/26/19 217 lb (98.4 kg)   08/22/19 218 lb (98.9 kg)             BP Readings from Last 5 Encounters:   12/05/19 90/60   11/12/19 101/61   10/18/19 (!) 88/68   09/26/19 120/80   08/22/19 124/72         General appearance: Alert, well appearing, and in no distress   Eyes: Sclerae anicteric, Pupils equal, EOMI  ENT: Hearing grossly normal bilaterally, normal dentition  CV: RR, paced, no M/R/G, no JVD, normal distal pulses, no lower extremity edema  Pulm: Clear to auscultation, no wheezes, no crackles, no accessory muscle use  GI: Soft, nontender, nondistended  Neuro: Alert and oriented  Skin: Normal coloration and turgor. Psych: Appropriate affect, providing full and comprehensive history  MSK: normal muscle bulk and tone     Medical problems and test results were reviewed with the patient today.            Lab Results   Component Value Date/Time     Potassium 3.9 11/12/2019 03:33 AM            Lab Results   Component Value Date/Time     Creatinine 0.85 11/12/2019 03:33 AM            Lab Results   Component Value Date/Time     HGB 12.7 11/11/2019 08:34 AM            Lab Results   Component Value Date/Time     INR 1.0 11/11/2019 08:34 AM     INR 1.0 12/09/2016 11:30 AM     INR 1.1 09/14/2016 10:10 AM     Prothrombin time 13.8 11/11/2019 08:34 AM     Prothrombin time 10.6 12/09/2016 11:30 AM     Prothrombin time 11.4 09/14/2016 10:10 AM         EKG: (EKG has been independently visualized by me with interpretation below)  atrial and biventricular pacing     Diagnoses and all orders for this visit:     1. Persistent atrial fibrillation  -     CARDIOVERSION EXTERNAL; Future     2. Left atrial thrombus     3. Chronic systolic CHF (congestive heart failure) (HCC)     4. BENNY (obstructive sleep apnea)     5. Biventricular automatic implantable cardioverter defibrillator in situ           ASSESSMENT and PLAN  1. NICM, uncontrolled, worsening EF: decreasing BiV pacing and increasing AF burden.      2. Persistent AF, uncontrolled: s/p ablation, early reversion back into afib, plan for DCCV     3. MADELYN thrombus: resolved, cont meds     4. BiV ICD: will try to optimize biventricular pacing percentage as noted above, functioning normally     Patient has been instructed and agrees to call our office with any issues or other concerns related to their cardiac condition(s) and/or complaint(s). Michel Beltrán MD, MS  Cardiology/Electrophysiology  12/5/2019  12:40 PM          ADDENDUM:  Patient is a direct admit today for sotalol load then likely DCCV prior to d/c.   V/S stable. Heart -S1 S2 slightly irregular   Lungs-clear   Abd-soft BS+  Ext-no edema   Plan- admit to tele; stat labs and EKG on arrival. If EKG and labs [facility] will start sotalol tonight with daily labs and EKGs. Will likely need to consider DCCV on Friday. Edwin Vanegas

## 2019-12-11 NOTE — PROGRESS NOTES
Spoke with Odin Amaral NP regarding patient's qtc result 538. Per NP qtc on ekg is not accurate given underlying afib. Ok to proceed with sotalol dose as ordered this evening.

## 2019-12-11 NOTE — PROGRESS NOTES
Spoke with Farshad Jamison Np to clarify current orders for carvedilol with orders for sotalol starting today as well. Per Sae Zaragoza d/c the orders for carvedilol at this time.

## 2019-12-12 LAB
ANION GAP SERPL CALC-SCNC: 3 MMOL/L (ref 7–16)
ATRIAL RATE: 153 BPM
ATRIAL RATE: 70 BPM
BUN SERPL-MCNC: 16 MG/DL (ref 8–23)
CALCIUM SERPL-MCNC: 8.6 MG/DL (ref 8.3–10.4)
CALCULATED R AXIS, ECG10: -66 DEGREES
CALCULATED R AXIS, ECG10: -86 DEGREES
CALCULATED T AXIS, ECG11: 100 DEGREES
CALCULATED T AXIS, ECG11: 105 DEGREES
CHLORIDE SERPL-SCNC: 109 MMOL/L (ref 98–107)
CO2 SERPL-SCNC: 32 MMOL/L (ref 21–32)
CREAT SERPL-MCNC: 0.92 MG/DL (ref 0.6–1)
DIAGNOSIS, 93000: NORMAL
DIAGNOSIS, 93000: NORMAL
GLUCOSE SERPL-MCNC: 95 MG/DL (ref 65–100)
MAGNESIUM SERPL-MCNC: 2.2 MG/DL (ref 1.8–2.4)
POTASSIUM SERPL-SCNC: 4 MMOL/L (ref 3.5–5.1)
Q-T INTERVAL, ECG07: 498 MS
Q-T INTERVAL, ECG07: 502 MS
QRS DURATION, ECG06: 172 MS
QRS DURATION, ECG06: 178 MS
QTC CALCULATION (BEZET), ECG08: 578 MS
QTC CALCULATION (BEZET), ECG08: 583 MS
SODIUM SERPL-SCNC: 144 MMOL/L (ref 136–145)
VENTRICULAR RATE, ECG03: 81 BPM
VENTRICULAR RATE, ECG03: 81 BPM

## 2019-12-12 PROCEDURE — 93005 ELECTROCARDIOGRAM TRACING: CPT | Performed by: INTERNAL MEDICINE

## 2019-12-12 PROCEDURE — 36415 COLL VENOUS BLD VENIPUNCTURE: CPT

## 2019-12-12 PROCEDURE — 65660000000 HC RM CCU STEPDOWN

## 2019-12-12 PROCEDURE — 83735 ASSAY OF MAGNESIUM: CPT

## 2019-12-12 PROCEDURE — 80048 BASIC METABOLIC PNL TOTAL CA: CPT

## 2019-12-12 PROCEDURE — 74011250637 HC RX REV CODE- 250/637: Performed by: NURSE PRACTITIONER

## 2019-12-12 RX ADMIN — APIXABAN 5 MG: 5 TABLET, FILM COATED ORAL at 09:17

## 2019-12-12 RX ADMIN — SOTALOL HYDROCHLORIDE 160 MG: 80 TABLET ORAL at 09:17

## 2019-12-12 RX ADMIN — ATORVASTATIN CALCIUM 20 MG: 10 TABLET, FILM COATED ORAL at 09:17

## 2019-12-12 RX ADMIN — LEVOTHYROXINE SODIUM 88 MCG: 88 TABLET ORAL at 09:17

## 2019-12-12 RX ADMIN — METFORMIN HYDROCHLORIDE 500 MG: 500 TABLET ORAL at 21:02

## 2019-12-12 RX ADMIN — APIXABAN 5 MG: 5 TABLET, FILM COATED ORAL at 21:02

## 2019-12-12 RX ADMIN — PANTOPRAZOLE SODIUM 40 MG: 40 TABLET, DELAYED RELEASE ORAL at 09:17

## 2019-12-12 RX ADMIN — SOTALOL HYDROCHLORIDE 160 MG: 80 TABLET ORAL at 21:03

## 2019-12-12 RX ADMIN — Medication 5 ML: at 06:08

## 2019-12-12 RX ADMIN — Medication 10 ML: at 21:07

## 2019-12-12 RX ADMIN — Medication 5 ML: at 16:50

## 2019-12-12 NOTE — PROGRESS NOTES
Bedside and verbal shift report received from [facility], [address] [facility]

## 2019-12-12 NOTE — PROGRESS NOTES
BP 85/47, MAP 60, HR 82. Spoke with Darby Arroyo about pt BP and scheduled sotalol and entresto dose. Received order to hold entresto and to give sotalol. Will administer and continue to monitor.

## 2019-12-12 NOTE — PROGRESS NOTES
Bedside and Verbal shift change report given to Gabe Chiang (oncoming nurse) by Bib Sanchez Rn (offgoing nurse). Report given with SBAR, Procedure Summary, Intake/Output, MAR and Recent Results.

## 2019-12-12 NOTE — PROGRESS NOTES
Bedside verbal report received from [facility] including SBAR, Kardex, and MAR. Spoke with MD Cavanaugh about pt . MD viewed EKG. Patient paced rhythm HR 80's. Per night shift report SBP 80-90's. Per MD ok to give morning dose of sotalol. EKG notified that ekg scheduled for 1100 am. Will continue to monitor.

## 2019-12-12 NOTE — PROGRESS NOTES
[facility] CARDIOLOGY PROGRESS NOTE           12/12/2019 7:39 AM    Admit Date: 12/11/2019    Admit Diagnosis: PAF (paroxysmal atrial fibrillation) ([facility] [address] [I48.0]    Assessment:   Active Problems:    PAF (paroxysmal atrial fibrillation) ([facility] [address] (12/11/2019)    Dilated cardiomyopathy    Plan:   1. Persistent AF - difficult to tell if in AF, will need interrogation to prove. V pacing. Continue sotalol load. QTc prolonged but pacing QRS >175 msec. 2. Stroke ppx - continue Eliquis. 3. HoTN - low likely due to DCM. 4. DCM - continue GDMT. 5. Dispo - pending successful sotalol load. Thank you for allowing me to participate in the electrophysiologic care of this most pleasant patient. Please feel free to contact me if there are any questions or concerns. Faustino Adnrea. MD Mao, MS  Clinical Cardiac Electrophysiology  [facility]    Subjective:   No complaints this AM, no chest pain or shortness of breath    Interval History: (History of pertinent interval events obtained from nursing staff)    ROS:  GEN:  No fever or chills  Cardiovascular:  As noted above  Pulmonary:  As noted above  Neuro:  No new focal motor or sensory loss      Objective:     Vitals:    12/11/19 1722 12/11/19 2030 12/12/19 0037 12/12/19 0439   BP: 97/52 91/59 99/62 (!) 89/59   Pulse: 80 86 80 80   Resp: 20 19 18 19   Temp: 97.2 °F (36.2 °C) 97.5 °F (36.4 °C) 97.6 °F (36.4 °C) 97.9 °F (36.6 °C)   SpO2: 99% 98% 97% 94%   Weight:    216 lb 4.8 oz (98.1 kg)   Height:           Physical Exam:  General-Well Developed, Well Nourished, No Acute Distress, Alert & Oriented x 3, appropriate mood. Neck- supple, no JVD  CV- regular rate and rhythm no MRG  Lung- clear bilaterally  Abd- soft, nontender, nondistended  Ext- no edema bilaterally.   Skin- warm and dry    Current Facility-Administered Medications   Medication Dose Route Frequency    apixaban (ELIQUIS) tablet 5 mg  5 mg Oral Q12H    atorvastatin (LIPITOR) tablet 20 mg  20 mg Oral DAILY    levothyroxine (SYNTHROID) tablet 88 mcg  88 mcg Oral ACB    metFORMIN (GLUCOPHAGE) tablet 500 mg  500 mg Oral QHS    pantoprazole (PROTONIX) tablet 40 mg  40 mg Oral ACB    sacubitril-valsartan (ENTRESTO) 24-26 mg tablet 1 Tab  1 Tab Oral BID    sodium chloride (NS) flush 5-40 mL  5-40 mL IntraVENous Q8H    sodium chloride (NS) flush 5-40 mL  5-40 mL IntraVENous PRN    acetaminophen (TYLENOL) tablet 650 mg  650 mg Oral Q4H PRN    sotalol (BETAPACE) tablet 160 mg  160 mg Oral Q12H       Data Review:   Recent Results (from the past 24 hour(s))   METABOLIC PANEL, COMPREHENSIVE    Collection Time: 12/11/19  3:14 PM   Result Value Ref Range    Sodium 141 136 - 145 mmol/L    Potassium 3.5 3.5 - 5.1 mmol/L    Chloride 106 98 - 107 mmol/L    CO2 31 21 - 32 mmol/L    Anion gap 4 (L) 7 - 16 mmol/L    Glucose 84 65 - 100 mg/dL    BUN 16 8 - 23 MG/DL    Creatinine 0.98 0.6 - 1.0 MG/DL    GFR est AA >60 >60 ml/min/1.73m2    GFR est non-AA >60 >60 ml/min/1.73m2    Calcium 8.3 8.3 - 10.4 MG/DL    Bilirubin, total 0.7 0.2 - 1.1 MG/DL    ALT (SGPT) 18 12 - 65 U/L    AST (SGOT) 14 (L) 15 - 37 U/L    Alk.  phosphatase 97 50 - 136 U/L    Protein, total 7.0 6.3 - 8.2 g/dL    Albumin 3.5 3.2 - 4.6 g/dL    Globulin 3.5 2.3 - 3.5 g/dL    A-G Ratio 1.0 (L) 1.2 - 3.5     MAGNESIUM    Collection Time: 12/11/19  3:14 PM   Result Value Ref Range    Magnesium 2.0 1.8 - 2.4 mg/dL   CBC W/O DIFF    Collection Time: 12/11/19  3:14 PM   Result Value Ref Range    WBC 6.4 4.3 - 11.1 K/uL    RBC 3.96 (L) 4.05 - 5.2 M/uL    HGB 12.0 11.7 - 15.4 g/dL    HCT 37.3 35.8 - 46.3 %    MCV 94.2 79.6 - 97.8 FL    MCH 30.3 26.1 - 32.9 PG    MCHC 32.2 31.4 - 35.0 g/dL    RDW 12.7 11.9 - 14.6 %    PLATELET 942 650 - 900 K/uL    MPV 10.9 9.4 - 12.3 FL    ABSOLUTE NRBC 0.00 0.0 - 0.2 K/uL   EKG, 12 LEAD, INITIAL    Collection Time: 12/11/19  3:43 PM   Result Value Ref Range    Ventricular Rate 82 BPM    Atrial Rate 83 BPM    QRS Duration 134 ms    Q-T Interval 462 ms    QTC Calculation (Bezet) 539 ms    Calculated R Axis -61 degrees    Calculated T Axis 104 degrees    Diagnosis       Ventricular-paced rhythm with occasional Premature ventricular complexes  Biventricular pacemaker detected  Abnormal ECG  When compared with ECG of 12-NOV-2019 07:11,  Premature ventricular complexes are now Present  Vent. rate has increased BY   2 BPM  Confirmed by NHUNG ROMERO (), Brunilda Huntley ([id number]) on 12/11/2019 4:55:03 PM     EKG, 12 LEAD, SUBSEQUENT    Collection Time: 12/11/19  7:59 PM   Result Value Ref Range    Ventricular Rate 81 BPM    Atrial Rate 70 BPM    QRS Duration 178 ms    Q-T Interval 502 ms    QTC Calculation (Bezet) 583 ms    Calculated R Axis -86 degrees    Calculated T Axis 100 degrees    Diagnosis       Ventricular-paced rhythm with Premature ventricular complexes  Premature ventricular complexes are no longer Present    Confirmed by Lucas Cobos ([id number]) on 12/12/2019 2:44:16 AM     METABOLIC PANEL, BASIC    Collection Time: 12/12/19  4:14 AM   Result Value Ref Range    Sodium 144 136 - 145 mmol/L    Potassium 4.0 3.5 - 5.1 mmol/L    Chloride 109 (H) 98 - 107 mmol/L    CO2 32 21 - 32 mmol/L    Anion gap 3 (L) 7 - 16 mmol/L    Glucose 95 65 - 100 mg/dL    BUN 16 8 - 23 MG/DL    Creatinine 0.92 0.6 - 1.0 MG/DL    GFR est AA >60 >60 ml/min/1.73m2    GFR est non-AA >60 >60 ml/min/1.73m2    Calcium 8.6 8.3 - 10.4 MG/DL   MAGNESIUM    Collection Time: 12/12/19  4:14 AM   Result Value Ref Range    Magnesium 2.2 1.8 - 2.4 mg/dL       EKG:  (EKG has been independently visualized by me with interpretation below): Atrial sensed, ventricular paced.

## 2019-12-12 NOTE — PROGRESS NOTES
Verbal bedside report given to [facility], oncoming RN. Patient's situation, background, assessment and recommendations provided. Opportunity for questions provided. Oncoming RN assumed care of patient.

## 2019-12-13 LAB
ANION GAP SERPL CALC-SCNC: 4 MMOL/L (ref 7–16)
ATRIAL RATE: 120 BPM
ATRIAL RATE: 85 BPM
BUN SERPL-MCNC: 14 MG/DL (ref 8–23)
CALCIUM SERPL-MCNC: 8.5 MG/DL (ref 8.3–10.4)
CALCULATED R AXIS, ECG10: -64 DEGREES
CALCULATED R AXIS, ECG10: -67 DEGREES
CALCULATED T AXIS, ECG11: 107 DEGREES
CALCULATED T AXIS, ECG11: 96 DEGREES
CHLORIDE SERPL-SCNC: 109 MMOL/L (ref 98–107)
CO2 SERPL-SCNC: 30 MMOL/L (ref 21–32)
CREAT SERPL-MCNC: 1 MG/DL (ref 0.6–1)
DIAGNOSIS, 93000: NORMAL
DIAGNOSIS, 93000: NORMAL
GLUCOSE SERPL-MCNC: 109 MG/DL (ref 65–100)
MAGNESIUM SERPL-MCNC: 2.3 MG/DL (ref 1.8–2.4)
POTASSIUM SERPL-SCNC: 4 MMOL/L (ref 3.5–5.1)
Q-T INTERVAL, ECG07: 472 MS
Q-T INTERVAL, ECG07: 504 MS
QRS DURATION, ECG06: 122 MS
QRS DURATION, ECG06: 176 MS
QTC CALCULATION (BEZET), ECG08: 544 MS
QTC CALCULATION (BEZET), ECG08: 581 MS
SODIUM SERPL-SCNC: 143 MMOL/L (ref 136–145)
VENTRICULAR RATE, ECG03: 80 BPM
VENTRICULAR RATE, ECG03: 80 BPM

## 2019-12-13 PROCEDURE — 83735 ASSAY OF MAGNESIUM: CPT

## 2019-12-13 PROCEDURE — 80048 BASIC METABOLIC PNL TOTAL CA: CPT

## 2019-12-13 PROCEDURE — 92960 CARDIOVERSION ELECTRIC EXT: CPT

## 2019-12-13 PROCEDURE — 5A2204Z RESTORATION OF CARDIAC RHYTHM, SINGLE: ICD-10-PCS | Performed by: INTERNAL MEDICINE

## 2019-12-13 PROCEDURE — 99152 MOD SED SAME PHYS/QHP 5/>YRS: CPT

## 2019-12-13 PROCEDURE — 36415 COLL VENOUS BLD VENIPUNCTURE: CPT

## 2019-12-13 PROCEDURE — 93005 ELECTROCARDIOGRAM TRACING: CPT | Performed by: INTERNAL MEDICINE

## 2019-12-13 PROCEDURE — 74011250637 HC RX REV CODE- 250/637: Performed by: NURSE PRACTITIONER

## 2019-12-13 PROCEDURE — 65660000000 HC RM CCU STEPDOWN

## 2019-12-13 PROCEDURE — 77030009397 HC ELECTRD ECG PACE ZOLL -B

## 2019-12-13 PROCEDURE — 74011250636 HC RX REV CODE- 250/636: Performed by: INTERNAL MEDICINE

## 2019-12-13 RX ADMIN — ATORVASTATIN CALCIUM 20 MG: 10 TABLET, FILM COATED ORAL at 09:16

## 2019-12-13 RX ADMIN — SACUBITRIL AND VALSARTAN 1 TABLET: 24; 26 TABLET, FILM COATED ORAL at 23:02

## 2019-12-13 RX ADMIN — APIXABAN 5 MG: 5 TABLET, FILM COATED ORAL at 21:01

## 2019-12-13 RX ADMIN — Medication 10 ML: at 21:02

## 2019-12-13 RX ADMIN — Medication 10 ML: at 05:05

## 2019-12-13 RX ADMIN — LEVOTHYROXINE SODIUM 88 MCG: 88 TABLET ORAL at 09:16

## 2019-12-13 RX ADMIN — Medication 10 ML: at 11:28

## 2019-12-13 RX ADMIN — FENTANYL CITRATE 100 MCG: 50 INJECTION INTRAMUSCULAR; INTRAVENOUS at 13:50

## 2019-12-13 RX ADMIN — SOTALOL HYDROCHLORIDE 160 MG: 80 TABLET ORAL at 09:15

## 2019-12-13 RX ADMIN — SOTALOL HYDROCHLORIDE 160 MG: 80 TABLET ORAL at 21:01

## 2019-12-13 RX ADMIN — SACUBITRIL AND VALSARTAN 1 TABLET: 24; 26 TABLET, FILM COATED ORAL at 11:27

## 2019-12-13 RX ADMIN — MIDAZOLAM 8 MG: 1 INJECTION INTRAMUSCULAR; INTRAVENOUS at 13:50

## 2019-12-13 RX ADMIN — APIXABAN 5 MG: 5 TABLET, FILM COATED ORAL at 09:16

## 2019-12-13 RX ADMIN — PANTOPRAZOLE SODIUM 40 MG: 40 TABLET, DELAYED RELEASE ORAL at 09:16

## 2019-12-13 RX ADMIN — METFORMIN HYDROCHLORIDE 500 MG: 500 TABLET ORAL at 21:01

## 2019-12-13 NOTE — PROGRESS NOTES
Bedside and Verbal shift change report given to Say Gonzales RN (oncoming nurse) by self Susana swann). Report included the following information SBAR, Kardex and MAR.

## 2019-12-13 NOTE — PROGRESS NOTES
Bedside and Verbal shift change report given to [address] [place] [address] (oncoming nurse) by Minerva Essex (offgoing nurse). Report included the following information SBAR, Kardex, Intake/Output and Recent Results. Discussed patients most recent QTC.

## 2019-12-13 NOTE — PROGRESS NOTES
TRANSFER - IN REPORT:    Verbal report received from [facility] RN(name) on Arely Tee  being received from cath lab(unit) for routine progression of care      Report consisted of patients Situation, Background, Assessment and   Recommendations(SBAR). Information from the following report(s) SBAR, Kardex and MAR was reviewed with the receiving nurse. Opportunity for questions and clarification was provided. Will assume care when patient arrives back on unit.

## 2019-12-13 NOTE — PROGRESS NOTES
Bedside and Verbal shift change report given to [address] [place] [address] (oncoming nurse) by self (offgoing nurse). Report included the following information SBAR, Kardex and MAR.

## 2019-12-13 NOTE — PROGRESS NOTES
Bedside and Verbal shift change report given to self (oncoming nurse) by Elis Forrester RN (offgoing nurse). Report included the following information SBAR, Kardex and MAR. Discussed most recent QTC with MD Hernandez, received order to continue sotalol dose as ordered. Per MD keep patient NPO for possible cardioversion. Patient's device to be interrogated today.

## 2019-12-13 NOTE — PROCEDURES
Pre-Procedure Diagnosis  1. Atrial fibrilllation     Procedure Performed  1. Direct Current Cardioversion    Estimated Blood Loss: None, not applicable    Procedural Description: The patient was brought to the procedure room in a fasting, nonsedated state. The risks, benefits and alternatives of the procedure were reviewed with the patient, and final questions answered. Informed consent was confirmed. A procedural timeout was called and completed per institutional policy. Once appropriate monitors were applied, fentanyl and versed were given in increments of 1-2mg versed and 25 mcg fentanyl to obtain an appropriate level of conscious sedation with continuous oxygen saturation measurement and blood pressure monitoring at minimal increments of every 5 minutes. Once an appropriate level of sedation was achieved, the patient was converted to sinus rhythm with pads across the anterior and posterior chest wall. The patient awoke from his procedure without overt complications. Post Procedure Diagnosis: Normal sinus rhythm    David Hernandez MD, MS  Clinical Cardiac Electrophysiology  12/13/2019  2:05 PM

## 2019-12-13 NOTE — PROGRESS NOTES
Bedside and Verbal shift change report given to Patricia Granados (oncoming nurse) by Lawrence Alexander (offgoing nurse). Report included the following information SBAR, Kardex, Intake/Output and Recent Results.

## 2019-12-13 NOTE — PROGRESS NOTES
TRANSFER - OUT REPORT:    Verbal report given to Tim Salgado RN(name) on Mitchell Calix  being transferred to [facility]  (unit) for routine progression of care       Report consisted of patients Situation, Background, Assessment and   Recommendations(SBAR). Information from the following report(s) Procedure Summary, MAR and Cardiac Rhythm AV Paced was reviewed with the receiving nurse. Lines:   Peripheral IV 12/11/19 Right Antecubital (Active)   Site Assessment Clean, dry, & intact 12/13/2019  7:29 AM   Phlebitis Assessment 0 12/13/2019  7:29 AM   Infiltration Assessment 0 12/13/2019  7:29 AM   Dressing Status Clean, dry, & intact 12/13/2019  7:29 AM   Dressing Type Transparent;Tape 12/13/2019  7:29 AM   Hub Color/Line Status Patent 12/13/2019  7:29 AM        Opportunity for questions and clarification was provided.       Patient transported with:   O2 @ 2 liters  Registered Nurse     S/p CVN Dr Marcellus Baird  Versed 8 mg IV  fent 100 mcg IV  Sync shock @ 200 joules x1

## 2019-12-14 LAB
ANION GAP SERPL CALC-SCNC: 4 MMOL/L (ref 7–16)
ATRIAL RATE: 70 BPM
ATRIAL RATE: 73 BPM
BUN SERPL-MCNC: 13 MG/DL (ref 8–23)
CALCIUM SERPL-MCNC: 8.7 MG/DL (ref 8.3–10.4)
CALCULATED P AXIS, ECG09: 112 DEGREES
CALCULATED P AXIS, ECG09: 75 DEGREES
CALCULATED R AXIS, ECG10: -68 DEGREES
CALCULATED R AXIS, ECG10: -75 DEGREES
CALCULATED T AXIS, ECG11: 98 DEGREES
CALCULATED T AXIS, ECG11: 99 DEGREES
CHLORIDE SERPL-SCNC: 110 MMOL/L (ref 98–107)
CO2 SERPL-SCNC: 28 MMOL/L (ref 21–32)
CREAT SERPL-MCNC: 0.8 MG/DL (ref 0.6–1)
DIAGNOSIS, 93000: NORMAL
DIAGNOSIS, 93000: NORMAL
GLUCOSE SERPL-MCNC: 85 MG/DL (ref 65–100)
MAGNESIUM SERPL-MCNC: 2.2 MG/DL (ref 1.8–2.4)
P-R INTERVAL, ECG05: 276 MS
P-R INTERVAL, ECG05: 278 MS
POTASSIUM SERPL-SCNC: 4.1 MMOL/L (ref 3.5–5.1)
Q-T INTERVAL, ECG07: 532 MS
Q-T INTERVAL, ECG07: 540 MS
QRS DURATION, ECG06: 168 MS
QRS DURATION, ECG06: 170 MS
QTC CALCULATION (BEZET), ECG08: 574 MS
QTC CALCULATION (BEZET), ECG08: 594 MS
SODIUM SERPL-SCNC: 142 MMOL/L (ref 136–145)
VENTRICULAR RATE, ECG03: 70 BPM
VENTRICULAR RATE, ECG03: 73 BPM

## 2019-12-14 PROCEDURE — 80048 BASIC METABOLIC PNL TOTAL CA: CPT

## 2019-12-14 PROCEDURE — 36415 COLL VENOUS BLD VENIPUNCTURE: CPT

## 2019-12-14 PROCEDURE — 74011250637 HC RX REV CODE- 250/637: Performed by: NURSE PRACTITIONER

## 2019-12-14 PROCEDURE — 83735 ASSAY OF MAGNESIUM: CPT

## 2019-12-14 PROCEDURE — 93005 ELECTROCARDIOGRAM TRACING: CPT | Performed by: INTERNAL MEDICINE

## 2019-12-14 PROCEDURE — 65660000000 HC RM CCU STEPDOWN

## 2019-12-14 RX ORDER — SOTALOL HYDROCHLORIDE 160 MG/1
160 TABLET ORAL EVERY 12 HOURS
Qty: 60 TAB | Refills: 11 | Status: SHIPPED | OUTPATIENT
Start: 2019-12-14 | End: 2019-12-15

## 2019-12-14 RX ORDER — SOTALOL HYDROCHLORIDE 80 MG/1
120 TABLET ORAL EVERY 12 HOURS
Status: DISCONTINUED | OUTPATIENT
Start: 2019-12-14 | End: 2019-12-15 | Stop reason: HOSPADM

## 2019-12-14 RX ADMIN — SOTALOL HYDROCHLORIDE 160 MG: 80 TABLET ORAL at 09:16

## 2019-12-14 RX ADMIN — ATORVASTATIN CALCIUM 20 MG: 10 TABLET, FILM COATED ORAL at 08:18

## 2019-12-14 RX ADMIN — APIXABAN 5 MG: 5 TABLET, FILM COATED ORAL at 08:13

## 2019-12-14 RX ADMIN — ACETAMINOPHEN 650 MG: 325 TABLET, FILM COATED ORAL at 23:13

## 2019-12-14 RX ADMIN — SOTALOL HYDROCHLORIDE 120 MG: 80 TABLET ORAL at 20:57

## 2019-12-14 RX ADMIN — PANTOPRAZOLE SODIUM 40 MG: 40 TABLET, DELAYED RELEASE ORAL at 08:18

## 2019-12-14 RX ADMIN — Medication 10 ML: at 14:14

## 2019-12-14 RX ADMIN — SACUBITRIL AND VALSARTAN 1 TABLET: 24; 26 TABLET, FILM COATED ORAL at 08:13

## 2019-12-14 RX ADMIN — APIXABAN 5 MG: 5 TABLET, FILM COATED ORAL at 20:57

## 2019-12-14 RX ADMIN — Medication 10 ML: at 21:00

## 2019-12-14 RX ADMIN — METFORMIN HYDROCHLORIDE 500 MG: 500 TABLET ORAL at 23:07

## 2019-12-14 RX ADMIN — SACUBITRIL AND VALSARTAN 1 TABLET: 24; 26 TABLET, FILM COATED ORAL at 23:06

## 2019-12-14 RX ADMIN — LEVOTHYROXINE SODIUM 88 MCG: 88 TABLET ORAL at 08:18

## 2019-12-14 RX ADMIN — Medication 10 ML: at 06:13

## 2019-12-14 NOTE — PROGRESS NOTES
[facility] CARDIOLOGY PROGRESS NOTE           12/14/2019 12:57 PM    Admit Date: 12/11/2019      Subjective:   Planned DC today but pt QTc Continued to elongated with RBBB to 597. Pt has no complaints overnight. She reports no sob, cp, palpitations, or weakness. ROS:  Cardiovascular:  As noted above    Objective:      Vitals:    12/14/19 0047 12/14/19 0503 12/14/19 0813 12/14/19 1156   BP: 92/60 97/61 92/51 96/62   Pulse: 67 70 70 71   Resp: 16 16 18 18   Temp: 98.3 °F (36.8 °C) 97.8 °F (36.6 °C) 97.8 °F (36.6 °C) 97.8 °F (36.6 °C)   SpO2: 97% 96% 97% 96%   Weight:  99.2 kg (218 lb 12.8 oz)     Height:           Physical Exam:  General-No Acute Distress  Neck- supple, no JVD  CV- regular rate and rhythm no MRG  Lung- clear bilaterally  Abd- soft, nontender, nondistended  Ext- no edema bilaterally. Skin- warm and dry    Data Review:   Recent Labs     12/14/19  0413 12/13/19  0345  12/11/19  1514    143   < > 141   K 4.1 4.0   < > 3.5   MG 2.2 2.3   < > 2.0   BUN 13 14   < > 16   CREA 0.80 1.00   < > 0.98   GLU 85 109*   < > 84   WBC  --   --   --  6.4   HGB  --   --   --  12.0   HCT  --   --   --  37.3   PLT  --   --   --  185    < > = values in this interval not displayed. Assessment/Plan:     Active Problems:    PAF (paroxysmal atrial fibrillation) ([facility]) (12/11/2019)    Sotolal dosing current 160 mg BID with QTc of 597. Will reduce to 120 BID and hold overnight. EKG to be completed 2 hours after dosage adjustment tonight and tomorrow. Plan of DC tomorrow baring complications. Further recommendations pending clinical course and attending recommendations.             Robel Lobo NP  12/14/2019 12:57 PM

## 2019-12-14 NOTE — PROGRESS NOTES
Problem: Falls - Risk of  Goal: *Absence of Falls  Description  Document Juan Carlos Townsend Fall Risk and appropriate interventions in the flowsheet.   Outcome: Progressing Towards Goal  Note: Fall Risk Interventions:            Medication Interventions: Evaluate medications/consider consulting pharmacy, Patient to call before getting OOB, Teach patient to arise slowly

## 2019-12-14 NOTE — PROGRESS NOTES
Bedside and Verbal shift change report given to Barbara Riley (oncoming nurse) by self (offgoing nurse). Report included the following information SBAR, Kardex, MAR, Recent Results and Cardiac Rhythm NSR.

## 2019-12-14 NOTE — DISCHARGE SUMMARY
[facility] Cardiology Discharge Summary     Patient ID:  Naye Aguilera  [id number]  75 y.o.  1955    Admit date: 12/11/2019    Discharge date and time:  12-    Admitting Physician: Kenia Dacosta MD     Discharge Physician: Dr. Alice Hernandez    Admission Diagnoses: PAF (paroxysmal atrial fibrillation) ([address] [I48.0]    Discharge Diagnoses:    Diagnosis    PAF (paroxysmal atrial fibrillation)     Persistent atrial fibrillation    Hyperlipidemia    BENNY (obstructive sleep apnea)    Biventricular automatic implantable cardioverter defibrillator in situ    Chronic systolic CHF (congestive heart failure)        Cardiology Procedures this admission: DCCV, Sotalol loading  Consults: None    Hospital Course: Pt was seen by Dr. Katalina Gutiérrez for complaints of persistent AFib. Pt was directly admitted for Sotalol loading. Pt was taken for DCCV on 12/13/19 by Dr. Katalina Gutiérrez. Pt had successful DCCV back to [facility]. Pt tolerated the procedure well and was taken to the telemetry floor for recovery. Pt was monitored on telemetry for 6 doses of Sotalol with EKGs showing acceptable QTc intervals but QTc continued to prolongate and dosing was decreased to 120 mg BID for continued evaluation. The patients QTC the day of discharge was 475. On the AM of discharge, Pt was up feeling well without any complaints of CP, SOB or palpitations. Pt's labs were stable. Pt was seen and examined by Dr. Alice Hernandez and determined stable and ready for discharge. Pt was instructed on the importance of taking Sotalol and Eliquis without missing a dose. Pt will follow up in the office in the next 2 weeks. DISPOSITION: The patient is being discharged home in stable condition on a low saturated fat, low cholesterol and low salt diet. Pt is instructed to advance activities as tolerated limited to fatigue or shortness of breath.  Pt is instructed to call office or return to ER for immediate evaluation of any shortness of breath, palpitations or chest pain. Discharge Exam:   Visit Vitals  /63 (BP 1 Location: Right arm, BP Patient Position: At rest)   Pulse 62   Temp 98.8 °F (37.1 °C)   Resp 16   Ht 5' 10\" (1.778 m)   Wt 98.2 kg (216 lb 6.4 oz)   SpO2 95%   BMI 31.05 kg/m²       Pt has been seen by Dr. Armida Delgado: see his progress note for exam details. Recent Results (from the past 24 hour(s))   EKG, 12 LEAD, SUBSEQUENT    Collection Time: 12/14/19 11:02 AM   Result Value Ref Range    Ventricular Rate 73 BPM    Atrial Rate 73 BPM    P-R Interval 278 ms    QRS Duration 168 ms    Q-T Interval 540 ms    QTC Calculation (Bezet) 594 ms    Calculated P Axis 75 degrees    Calculated R Axis -75 degrees    Calculated T Axis 98 degrees    Diagnosis       AV dual-paced rhythm with prolonged AV conduction  Biventricular pacemaker detected  Abnormal ECG  When compared with ECG of 13-DEC-2019 22:48,  Vent. rate has increased BY   3 BPM  Confirmed by ST ABEBA MAYFIELD MD (), KEVIN LIU ([id number]) on 12/14/2019 12:43:13 PM     EKG, 12 LEAD, SUBSEQUENT    Collection Time: 12/14/19 11:04 PM   Result Value Ref Range    Ventricular Rate 70 BPM    Atrial Rate 70 BPM    P-R Interval 256 ms    QRS Duration 96 ms    Q-T Interval 440 ms    QTC Calculation (Bezet) 475 ms    Calculated R Axis 80 degrees    Calculated T Axis -70 degrees    Diagnosis       AV sequential or dual chamber electronic pacemaker  When compared with ECG of 14-DEC-2019 11:02,  Vent. rate has decreased BY   3 BPM     MAGNESIUM    Collection Time: 12/15/19  4:51 AM   Result Value Ref Range    Magnesium 2.2 1.8 - 2.4 mg/dL         Patient Instructions:     Current Discharge Medication List      START taking these medications    Details   sotalol (BETAPACE) 120 mg tablet Take 1 Tab by mouth every twelve (12) hours. Qty: 60 Tab, Refills: 11         CONTINUE these medications which have NOT CHANGED    Details   furosemide (LASIX) 20 mg tablet Take 1 Tab by mouth three (3) times daily as needed (swelling).   Qty: 90 Tab, Refills: 5      sacubitril-valsartan (ENTRESTO) 24 mg/26 mg tablet Take 1 Tab by mouth two (2) times a day. Qty: 60 Tab, Refills: 11      apixaban (ELIQUIS) 5 mg tablet Take 1 Tab by mouth two (2) times a day. Qty: 60 Tab, Refills: 11      atorvastatin (LIPITOR) 20 mg tablet Take 1 Tab by mouth daily. Qty: 30 Tab, Refills: 11      levothyroxine (SYNTHROID) 88 mcg tablet Take  by mouth Daily (before breakfast). metFORMIN (GLUCOPHAGE) 500 mg tablet Take 500 mg by mouth nightly. omeprazole (PRILOSEC) 20 mg capsule Take 20 mg by mouth daily. sucralfate (CARAFATE) 1 gram tablet Take 1 Tab by mouth Before breakfast, lunch, dinner and at bedtime. Qty: 120 Tab, Refills: 0      pantoprazole (PROTONIX) 40 mg tablet Take 1 Tab by mouth every twelve (12) hours. Qty: 60 Tab, Refills: 0      clonazePAM (KLONOPIN) 0.5 mg tablet Take 1 Tab by mouth nightly as needed for Other (sleep). Max Daily Amount: 0.5 mg.  Qty: 30 Tab, Refills: 3    Associated Diagnoses: BENNY (obstructive sleep apnea); Persistent disorder of initiating or maintaining sleep      cpap machine kit by Does Not Apply route.  9 cm         STOP taking these medications       carvedilol (COREG) 6.25 mg tablet Comments:   Reason for Stopping:             Eliana Layton NP  12/15/19  7:42 AM

## 2019-12-14 NOTE — DISCHARGE SUMMARY
[facility] Cardiology Discharge Summary     Patient ID:  Zoë Toussaint  [id number]  75 y.o.  1955    Admit date: 12/11/2019    Discharge date and time:  12-    Admitting Physician: Tim Leigh MD     Discharge Physician: Dr. Madison Rosales    Admission Diagnoses: PAF (paroxysmal atrial fibrillation) ([address] [I48.0]    Discharge Diagnoses:    Diagnosis    PAF (paroxysmal atrial fibrillation)     Persistent atrial fibrillation    Hyperlipidemia    BENNY (obstructive sleep apnea)    Biventricular automatic implantable cardioverter defibrillator in situ    Chronic systolic CHF (congestive heart failure)        Cardiology Procedures this admission: DCCV, Sotalol loading  Consults: None    Hospital Course: Pt was seen by Dr. Fernanda Forde for complaints of persistent AFib. Pt was directly admitted for Sotalol loading. Pt was taken for DCCV on 12/13/19 by Dr. Fernanda Forde. Pt had successful DCCV back to [facility]. Pt tolerated the procedure well and was taken to the telemetry floor for recovery. Pt was monitored on telemetry for 6 doses of Sotalol with EKGs showing acceptable QTc intervals. On the AM of discharge, Pt was up feeling well without any complaints of CP, SOB or palpitations. Pt's labs were stable. Pt was seen and examined by Dr. Madison Rosales and determined stable and ready for discharge. Pt was instructed on the importance of taking Sotalol and Eliquis without missing a dose. Pt will follow up in the office in the next 2 weeks. DISPOSITION: The patient is being discharged home in stable condition on a low saturated fat, low cholesterol and low salt diet. Pt is instructed to advance activities as tolerated limited to fatigue or shortness of breath. Pt is instructed to call office or return to ER for immediate evaluation of any shortness of breath, palpitations or chest pain.       Discharge Exam:   Visit Vitals  BP 92/51   Pulse 70   Temp 97.8 °F (36.6 °C)   Resp 16   Ht 5' 10\" (1.778 m)   Wt 99.2 kg (218 lb 12.8 oz)   SpO2 96%   BMI 31.39 kg/m²       Pt has been seen by Dr. Hayder Cobian: see his progress note for exam details. Recent Results (from the past 24 hour(s))   EKG, 12 LEAD, SUBSEQUENT    Collection Time: 12/13/19 10:50 AM   Result Value Ref Range    Ventricular Rate 80 BPM    Atrial Rate 120 BPM    QRS Duration 122 ms    Q-T Interval 472 ms    QTC Calculation (Bezet) 544 ms    Calculated R Axis -67 degrees    Calculated T Axis 96 degrees    Diagnosis       Ventricular-paced rhythm  Biventricular pacemaker detected  Abnormal ECG  When compared with ECG of 12-DEC-2019 23:16,  No significant change was found  Confirmed by ST ABEBA MAYFIELD MD (), KEVIN LIU ([id number]) on 12/13/2019 12:08:13 PM     EKG, 12 LEAD, SUBSEQUENT    Collection Time: 12/13/19 10:48 PM   Result Value Ref Range    Ventricular Rate 70 BPM    Atrial Rate 70 BPM    P-R Interval 276 ms    QRS Duration 170 ms    Q-T Interval 532 ms    QTC Calculation (Bezet) 574 ms    Calculated P Axis 112 degrees    Calculated R Axis -68 degrees    Calculated T Axis 99 degrees    Diagnosis       AV dual-paced rhythm with prolonged AV conduction  Biventricular pacemaker detected  Abnormal ECG  When compared with ECG of 13-DEC-2019 10:50,  Vent.  rate has decreased BY  10 BPM  Confirmed by ST ABEBA MAYFIELD MD (), KEVIN LIU ([id number]) on 12/14/2019 3:56:30 AM     METABOLIC PANEL, BASIC    Collection Time: 12/14/19  4:13 AM   Result Value Ref Range    Sodium 142 136 - 145 mmol/L    Potassium 4.1 3.5 - 5.1 mmol/L    Chloride 110 (H) 98 - 107 mmol/L    CO2 28 21 - 32 mmol/L    Anion gap 4 (L) 7 - 16 mmol/L    Glucose 85 65 - 100 mg/dL    BUN 13 8 - 23 MG/DL    Creatinine 0.80 0.6 - 1.0 MG/DL    GFR est AA >60 >60 ml/min/1.73m2    GFR est non-AA >60 >60 ml/min/1.73m2    Calcium 8.7 8.3 - 10.4 MG/DL   MAGNESIUM    Collection Time: 12/14/19  4:13 AM   Result Value Ref Range    Magnesium 2.2 1.8 - 2.4 mg/dL         Patient Instructions:     Current Discharge Medication List      START taking these medications    Details   sotalol (BETAPACE) 160 mg tablet Take 1 Tab by mouth every twelve (12) hours. Qty: 60 Tab, Refills: 11         CONTINUE these medications which have NOT CHANGED    Details   furosemide (LASIX) 20 mg tablet Take 1 Tab by mouth three (3) times daily as needed (swelling). Qty: 90 Tab, Refills: 5      sacubitril-valsartan (ENTRESTO) 24 mg/26 mg tablet Take 1 Tab by mouth two (2) times a day. Qty: 60 Tab, Refills: 11      apixaban (ELIQUIS) 5 mg tablet Take 1 Tab by mouth two (2) times a day. Qty: 60 Tab, Refills: 11      atorvastatin (LIPITOR) 20 mg tablet Take 1 Tab by mouth daily. Qty: 30 Tab, Refills: 11      levothyroxine (SYNTHROID) 88 mcg tablet Take  by mouth Daily (before breakfast). metFORMIN (GLUCOPHAGE) 500 mg tablet Take 500 mg by mouth nightly. omeprazole (PRILOSEC) 20 mg capsule Take 20 mg by mouth daily. sucralfate (CARAFATE) 1 gram tablet Take 1 Tab by mouth Before breakfast, lunch, dinner and at bedtime. Qty: 120 Tab, Refills: 0      pantoprazole (PROTONIX) 40 mg tablet Take 1 Tab by mouth every twelve (12) hours. Qty: 60 Tab, Refills: 0      clonazePAM (KLONOPIN) 0.5 mg tablet Take 1 Tab by mouth nightly as needed for Other (sleep). Max Daily Amount: 0.5 mg.  Qty: 30 Tab, Refills: 3    Associated Diagnoses: BENNY (obstructive sleep apnea); Persistent disorder of initiating or maintaining sleep      cpap machine kit by Does Not Apply route.  9 cm         STOP taking these medications       carvedilol (COREG) 6.25 mg tablet Comments:   Reason for Stopping:

## 2019-12-14 NOTE — PROGRESS NOTES
Bedside and Verbal shift change report given to Nila (oncoming nurse) by  Gaurav swann). Report included the following information SBAR, Kardex, Intake/Output, MAR and Recent Results.

## 2019-12-14 NOTE — PROGRESS NOTES
Problem: Falls - Risk of  Goal: *Absence of Falls  Description  Document Rebbecca Persons Fall Risk and appropriate interventions in the flowsheet.   Outcome: Progressing Towards Goal  Note: Fall Risk Interventions:            Medication Interventions: Assess postural VS orthostatic hypotension, Evaluate medications/consider consulting pharmacy, Patient to call before getting OOB, Teach patient to arise slowly                   Problem: Patient Education: Go to Patient Education Activity  Goal: Patient/Family Education  Outcome: Progressing Towards Goal     Problem: Patient Education: Go to Patient Education Activity  Goal: Patient/Family Education  Outcome: Resolved/Met     Problem: Afib Pathway: Day 1  Goal: Off Pathway (Use only if patient is Off Pathway)  Outcome: Resolved/Met  Goal: Activity/Safety  Outcome: Resolved/Met  Goal: Consults, if ordered  Outcome: Resolved/Met  Goal: Diagnostic Test/Procedures  Outcome: Resolved/Met  Goal: Nutrition/Diet  Outcome: Resolved/Met  Goal: Discharge Planning  Outcome: Resolved/Met  Goal: Medications  Outcome: Resolved/Met  Goal: Respiratory  Outcome: Resolved/Met  Goal: Treatments/Interventions/Procedures  Outcome: Resolved/Met  Goal: Psychosocial  Outcome: Resolved/Met  Goal: *Optimal pain control at patient's stated goal  Outcome: Resolved/Met  Goal: *Hemodynamically stable  Outcome: Resolved/Met  Goal: *Stable cardiac rhythm  Outcome: Resolved/Met  Goal: *Lungs clear or at baseline  Outcome: Resolved/Met  Goal: *Labs within defined limits  Outcome: Resolved/Met  Goal: *Describes available resources and support systems  Outcome: Resolved/Met     Problem: Afib Pathway: Day 2  Goal: Off Pathway (Use only if patient is Off Pathway)  Outcome: Resolved/Met  Goal: Activity/Safety  Outcome: Resolved/Met  Goal: Consults, if ordered  Outcome: Resolved/Met  Goal: Diagnostic Test/Procedures  Outcome: Resolved/Met  Goal: Nutrition/Diet  Outcome: Resolved/Met  Goal: Discharge Planning  Outcome: Resolved/Met  Goal: Medications  Outcome: Resolved/Met  Goal: Respiratory  Outcome: Resolved/Met  Goal: Treatments/Interventions/Procedures  Outcome: Resolved/Met  Goal: Psychosocial  Outcome: Resolved/Met  Goal: *Hemodynamically stable  Outcome: Resolved/Met  Goal: *Optimal pain control at patient's stated goal  Outcome: Resolved/Met  Goal: *Stable cardiac rhythm  Outcome: Resolved/Met  Goal: *Lungs clear or at baseline  Outcome: Resolved/Met  Goal: *Describes available resources and support systems  Outcome: Resolved/Met     Problem: Afib Pathway: Day 3  Goal: Off Pathway (Use only if patient is Off Pathway)  Outcome: Resolved/Met  Goal: Activity/Safety  Outcome: Resolved/Met  Goal: Diagnostic Test/Procedures  Outcome: Resolved/Met  Goal: Nutrition/Diet  Outcome: Resolved/Met  Goal: Discharge Planning  Outcome: Resolved/Met  Goal: Medications  Outcome: Resolved/Met  Goal: Respiratory  Outcome: Resolved/Met  Goal: Treatments/Interventions/Procedures  Outcome: Resolved/Met  Goal: Psychosocial  Outcome: Resolved/Met     Problem: Afib: Discharge Outcomes (not in CAT)  Goal: *Hemodynamically stable  Outcome: Resolved/Met  Goal: *Stable cardiac rhythm  Outcome: Resolved/Met  Goal: *Lungs clear or at baseline  Outcome: Resolved/Met  Goal: *Optimal pain control at patient's stated goal  Outcome: Resolved/Met  Goal: *Identifies cardiac risk factors  Outcome: Resolved/Met  Goal: *Verbalizes home exercise program, activity guidelines, cardiac precautions  Outcome: Resolved/Met  Goal: *Verbalizes understanding and describes prescribed diet  Outcome: Resolved/Met  Goal: *Verbalizes understanding and describes medication purposes and frequencies  Outcome: Resolved/Met  Goal: *Anxiety reduced or absent  Outcome: Resolved/Met  Goal: *Understands and describes signs and symptoms to report to providers(Stroke Metric)  Outcome: Resolved/Met  Goal: *Describes follow-up/return visits to physicians  Outcome: Resolved/Met  Goal: *Describes available resources and support systems  Outcome: Resolved/Met  Goal: *Influenza immunization  Outcome: Resolved/Met  Goal: *Pneumococcal immunization  Outcome: Resolved/Met  Goal: *Describes smoking cessation resources  Outcome: Resolved/Met

## 2019-12-14 NOTE — PROGRESS NOTES
Bedside and Verbal shift change report given to self (oncoming nurse) by Rashaad Rogers (offgoing nurse). Report included the following information SBAR, Kardex, MAR and Recent Results.

## 2019-12-14 NOTE — PROGRESS NOTES
Bedside and Verbal shift change report given to  (oncoming nurse) by Bernardino (offgoing nurse). Report included the following information SBAR, Kardex, MAR and Recent Results.

## 2019-12-15 VITALS
WEIGHT: 216.4 LBS | HEIGHT: 70 IN | TEMPERATURE: 98.6 F | OXYGEN SATURATION: 96 % | BODY MASS INDEX: 30.98 KG/M2 | RESPIRATION RATE: 16 BRPM | HEART RATE: 78 BPM | SYSTOLIC BLOOD PRESSURE: 98 MMHG | DIASTOLIC BLOOD PRESSURE: 64 MMHG

## 2019-12-15 LAB
ATRIAL RATE: 70 BPM
ATRIAL RATE: 71 BPM
CALCULATED P AXIS, ECG09: 102 DEGREES
CALCULATED R AXIS, ECG10: 43 DEGREES
CALCULATED R AXIS, ECG10: 80 DEGREES
CALCULATED T AXIS, ECG11: -70 DEGREES
CALCULATED T AXIS, ECG11: -79 DEGREES
DIAGNOSIS, 93000: NORMAL
DIAGNOSIS, 93000: NORMAL
MAGNESIUM SERPL-MCNC: 2.2 MG/DL (ref 1.8–2.4)
P-R INTERVAL, ECG05: 256 MS
P-R INTERVAL, ECG05: 260 MS
Q-T INTERVAL, ECG07: 440 MS
Q-T INTERVAL, ECG07: 472 MS
QRS DURATION, ECG06: 100 MS
QRS DURATION, ECG06: 96 MS
QTC CALCULATION (BEZET), ECG08: 475 MS
QTC CALCULATION (BEZET), ECG08: 512 MS
VENTRICULAR RATE, ECG03: 70 BPM
VENTRICULAR RATE, ECG03: 71 BPM

## 2019-12-15 PROCEDURE — 74011250637 HC RX REV CODE- 250/637: Performed by: NURSE PRACTITIONER

## 2019-12-15 PROCEDURE — 36415 COLL VENOUS BLD VENIPUNCTURE: CPT

## 2019-12-15 PROCEDURE — 93005 ELECTROCARDIOGRAM TRACING: CPT | Performed by: INTERNAL MEDICINE

## 2019-12-15 PROCEDURE — 83735 ASSAY OF MAGNESIUM: CPT

## 2019-12-15 RX ORDER — SOTALOL HYDROCHLORIDE 120 MG/1
120 TABLET ORAL EVERY 12 HOURS
Qty: 60 TAB | Refills: 11 | Status: SHIPPED | OUTPATIENT
Start: 2019-12-15 | End: 2020-10-19 | Stop reason: SDUPTHER

## 2019-12-15 RX ADMIN — SOTALOL HYDROCHLORIDE 120 MG: 80 TABLET ORAL at 08:45

## 2019-12-15 RX ADMIN — ATORVASTATIN CALCIUM 20 MG: 10 TABLET, FILM COATED ORAL at 08:44

## 2019-12-15 RX ADMIN — APIXABAN 5 MG: 5 TABLET, FILM COATED ORAL at 08:44

## 2019-12-15 RX ADMIN — Medication 10 ML: at 06:17

## 2019-12-15 RX ADMIN — PANTOPRAZOLE SODIUM 40 MG: 40 TABLET, DELAYED RELEASE ORAL at 08:44

## 2019-12-15 RX ADMIN — LEVOTHYROXINE SODIUM 88 MCG: 88 TABLET ORAL at 08:44

## 2019-12-15 RX ADMIN — SACUBITRIL AND VALSARTAN 1 TABLET: 24; 26 TABLET, FILM COATED ORAL at 09:57

## 2019-12-15 NOTE — DISCHARGE INSTRUCTIONS
Patient Education        Learning About Atrial Fibrillation  What is atrial fibrillation? Atrial fibrillation (say \"AY-tree-justine jvh-qobq-EDT-shun\") is the most common type of irregular heartbeat (arrhythmia). Normally, the heart beats in a strong, steady rhythm. In atrial fibrillation, a problem with the heart's electrical system causes the two upper parts of the heart (the atria) to quiver, or fibrillate. Your heart rate also may be faster than normal.  Atrial fibrillation can be dangerous because if the heartbeat isn't strong and steady, blood can collect, or pool, in the atria. And pooled blood is more likely to form clots. Clots can travel to the brain, block blood flow, and cause a stroke. Atrial fibrillation can also lead to heart failure. Treatment for atrial fibrillation helps prevent stroke and heart failure. It also helps relieve symptoms. Atrial fibrillation is often caused by another heart problem. It may happen after heart surgery. It may also be caused by other problems, such as an overactive thyroid gland or lung disease. Many people with atrial fibrillation are able to live full and active lives. What are the symptoms? Some people feel symptoms when they have episodes of atrial fibrillation. But other people don't notice any symptoms. If you have symptoms, you may feel:  · A fluttering, racing, or pounding feeling in your chest called palpitations. · Weak or tired. · Dizzy or lightheaded. · Short of breath. · Chest pain. · Confused. You may notice signs of atrial fibrillation when you check your pulse. Your pulse may seem uneven or fast.  What can you expect when you have atrial fibrillation? At first, spells of atrial fibrillation may come on suddenly and last a short time. It may go away on its own or it goes away after treatment. This is called paroxysmal atrial fibrillation. Over time, the spells may last longer and occur more often. They often don't go away on their own.   How is it treated? Treatments can help you feel better and prevent future problems, especially stroke and heart failure. The main types of treatment slow the heart rate, control the heart rhythm, and help prevent stroke. Your treatment will depend on the cause of your atrial fibrillation, your symptoms, and your risk for stroke. · Heart rate treatment. Medicine may be used to slow your heart rate. Your heartbeat may still be irregular. But these medicines keep your heart from beating too fast. They may also help relieve your symptoms. · Heart rhythm treatment. Different treatments may be used to try to stop atrial fibrillation and keep it from returning. They can also relieve symptoms. These treatments include medicine, electrical cardioversion to shock the heart back to a normal rhythm, a procedure called catheter ablation, and heart surgery. · Stroke prevention. You and your doctor can decide how to lower your risk. You may decide to take a blood-thinning medicine called an anticoagulant. How can you live well with it? You can live well and help manage atrial fibrillation by having a heart-healthy lifestyle. This lifestyle may help reduce how often you have episodes of atrial fibrillation. If you are overweight, losing weight can help relieve symptoms. To have a heart-healthy lifestyle:  · Don't smoke. · Eat heart-healthy foods. · Be active. Talk to your doctor about what type and level of exercise is safe for you. · Stay at a healthy weight. Lose weight if you need to. · Avoid alcohol if it triggers symptoms. · Manage other health problems such as high blood pressure, high cholesterol, and diabetes. · Avoid getting sick from the flu. Get a flu shot every year. · Manage stress. Where can you learn more? Go to http://jake-joão.info/. Enter [phone] in the search box to learn more about \"Learning About Atrial Fibrillation. \"  Current as of: April 9, 2019  Content Version: 12.2  © 4731-6618 [employer]. Care instructions adapted under license by [employer] (which disclaims liability or warranty for this information). If you have questions about a medical condition or this instruction, always ask your healthcare professional. [address] any warranty or liability for your use of this information. Patient Education        Learning About Rhythm-Control Medicines  Introduction    Rhythm-control medicines return your heart to a normal rhythm. And they keep it at a normal rhythm. They are also called antiarrhythmics. Doctors may use these medicines if:  · You have symptoms from a heart rhythm problem. · You had electric cardioversion. Or you will have it. · You had catheter ablation. · You tried medicine to control your heart rate. But it did not help. · You are at risk of having a dangerous heart rhythm. These medicines can have serious side effects. Examples  · Amiodarone (Pacerone)  · Dofetilide (Tikosyn)  · Propafenone (Rythmol)  · Sotalol (Betapace AF)  Possible side effects  Side effects depend on which medicine you take. One serious one is a very irregular heart rate. Read the information that comes with your medicine. What to know about taking this medicine  · You may be in the hospital when you start this medicine. Your doctor will watch what it does to your heart. · Be sure to ask your doctor any questions. You may want to know more about the pros and cons of the medicine. · Your doctor will check you often. He or she will make sure you are not having problems. Be sure to go to all of your visits. · You may need regular blood tests. These make sure you are taking the right amount of medicine. · Take your medicines exactly as prescribed. Call your doctor if you think you are having a problem with your medicine. · Check with your doctor or pharmacist before you use any other medicines.  This includes over-the-counter medicines. Make sure your doctor knows all of the medicines, vitamins, herbal products, and supplements you take. Taking some medicines together can cause problems. Where can you learn more? Go to http://jake-joão.info/. Enter X188 in the search box to learn more about \"Learning About Rhythm-Control Medicines. \"  Current as of: April 9, 2019  Content Version: 12.2  © 2006-2019 [employer]. Care instructions adapted under license by [employer] (which disclaims liability or warranty for this information). If you have questions about a medical condition or this instruction, always ask your healthcare professional. [address] any warranty or liability for your use of this information. Patient Education     Sotalol (By mouth)   Sotalol ([place]-ta-lol)  Treats heart rhythm problems. This medicine is a beta blocker. Brand Name(s): Betapace, Betapace AF, Sorine, Sotylize   There may be other brand names for this medicine. When This Medicine Should Not Be Used: This medicine is not right for everyone. Do not use it if you had an allergic reaction to sotalol, or you have certain heart or lung problems. Talk with your doctor about what these problems are. How to Use This Medicine:   Liquid, Tablet  · Take your medicine as directed. Your dose may need to be changed several times to find what works best for you. · Measure the oral liquid medicine with a marked measuring spoon, oral syringe, or medicine cup. · Contact your doctor or pharmacist before your supply of this medicine starts to run low. Do not allow yourself to run out of medicine. · Read and follow the patient instructions that come with this medicine. Talk to your doctor or pharmacist if you have any questions. · Missed dose: Take a dose as soon as you remember. If it is almost time for your next dose, wait until then and take a regular dose.  Do not take extra medicine to make up for a missed dose. · Store the medicine in a closed container at room temperature, away from heat, moisture, and direct light. Drugs and Foods to Avoid:   Ask your doctor or pharmacist before using any other medicine, including over-the-counter medicines, vitamins, and herbal products. · Some foods and medicines can affect how sotalol works. Tell your doctor if you are using the following:  ¨ Albuterol, clonidine, digoxin, guanethidine, isoproterenol, reserpine, terbutaline  ¨ Blood pressure medicines  ¨ Insulin or diabetes medicine  ¨ Medicine to treat depression  ¨ Medicine to treat an infection  ¨ Other medicine to treat heart rhythm problems, such as amiodarone, disopyramide, procainamide, or quinidine  ¨ Phenothiazine medicine (such as chlorpromazine, perphenazine, prochlorperazine, promethazine, thioridazine)  · If you are taking an antacid that contains aluminum or magnesium hydroxide, take it 2 hours before or 2 hours after you take sotalol. Warnings While Using This Medicine:   · Tell your doctor if you are pregnant or breastfeeding, or if you have kidney disease, diabetes, heart disease, angina, low blood pressure, lung or breathing problems, overactive thyroid, or a history of severe allergic reactions or heart attack. · This medicine may cause increased heart rhythm problems while your dose is being adjusted. · Do not stop using this medicine suddenly. Your doctor will need to slowly decrease your dose before you stop it completely. You could have worsening chest pain or heart rhythm problems if you stop using this medicine suddenly. · This medicine may raise or lower your blood sugar level. · This medicine may make you dizzy. Do not drive or do anything else that could be dangerous until you know how this medicine affects you. Stand up slowly if you feel dizzy or lightheaded. · Tell any doctor or dentist who treats you that you are using this medicine.   · Your doctor will do lab tests at regular visits to check on the effects of this medicine. Keep all appointments. ECG tests will be needed to check for unwanted effects. · Keep all medicine out of the reach of children. Never share your medicine with anyone. Possible Side Effects While Using This Medicine:   Call your doctor right away if you notice any of these side effects:  · Allergic reaction: Itching or hives, swelling in your face or hands, swelling or tingling in your mouth or throat, chest tightness, trouble breathing  · Chest pain  · Dry mouth, increased thirst, muscle cramps, nausea or vomiting  · Fainting, dizziness, lightheadedness  · Fast, slow, or irregular heartbeat  · Rapid weight gain, swelling in your hands, feet, or ankles, trouble breathing  If you notice these less serious side effects, talk with your doctor:   · Diarrhea  · Increased sweating  · Tiredness or weakness  If you notice other side effects that you think are caused by this medicine, tell your doctor. Call your doctor for medical advice about side effects. You may report side effects to FDA at 0-235-FDA-8575  © 2017 [facility] Information is for End User's use only and may not be sold, redistributed or otherwise used for commercial purposes. The above information is an  only. It is not intended as medical advice for individual conditions or treatments. Talk to your doctor, nurse or pharmacist before following any medical regimen to see if it is safe and effective for you.

## 2019-12-15 NOTE — PROGRESS NOTES
Discharge instructions reviewed with Luis Sergio Castillo. Prescriptions given for sotalol and med info sheets provided for all new medications. Opportunity for questions provided. Patient voiced understanding of all discharge instructions.      IV and heart monitor removed

## 2019-12-15 NOTE — PROGRESS NOTES
Pt is for discharge home today with no needs/supportive care orders recieved for CM at this time. Care Management Interventions  PCP Verified by CM: Gianfranco Chang III)  Mode of Transport at Discharge:  Other (see comment)(family)  Transition of Care Consult (CM Consult): Discharge Planning(Pt is insured with pharmacy benefits.  )  Discharge Durable Medical Equipment: No  Physical Therapy Consult: No  Occupational Therapy Consult: No  Speech Therapy Consult: No  Current Support Network: Lives with Spouse  Confirm Follow Up Transport: Family  Plan discussed with Pt/Family/Caregiver: Yes  Freedom of Choice Offered: Yes   Resource Information Provided?: No  Discharge Location  Discharge Placement: Home

## 2019-12-15 NOTE — PROGRESS NOTES
Problem: Falls - Risk of  Goal: *Absence of Falls  Description  Document Modesta Dear Fall Risk and appropriate interventions in the flowsheet.   Outcome: Progressing Towards Goal  Note: Fall Risk Interventions:            Medication Interventions: Evaluate medications/consider consulting pharmacy, Patient to call before getting OOB, Teach patient to arise slowly

## 2019-12-15 NOTE — PROGRESS NOTES
Bedside and Verbal shift change report given to self (oncoming nurse) by sabas (offgoing nurse). Report included the following information SBAR, Kardex, MAR and Recent Results.

## 2019-12-15 NOTE — PROGRESS NOTES
Bedside shift change report given to Nila (oncoming nurse) by  Regi Lee nurse). Report included the following information SBAR, Kardex, MAR and Recent Results.

## 2019-12-15 NOTE — PROGRESS NOTES
Bedside and Verbal shift change report given to  (oncoming nurse) by Milton Lemus (offgoing nurse). Report included the following information SBAR, Kardex, MAR and Recent Results.

## 2019-12-17 RX ORDER — FENTANYL CITRATE 50 UG/ML
25-100 INJECTION, SOLUTION INTRAMUSCULAR; INTRAVENOUS
Status: ACTIVE | OUTPATIENT
Start: 2019-12-17

## 2019-12-17 RX ORDER — MIDAZOLAM HYDROCHLORIDE 1 MG/ML
.5-2 INJECTION, SOLUTION INTRAMUSCULAR; INTRAVENOUS
Status: ACTIVE | OUTPATIENT
Start: 2019-12-17

## 2022-03-18 PROBLEM — I48.91 A-FIB (HCC): Status: ACTIVE | Noted: 2019-11-11

## 2022-03-18 PROBLEM — I48.0 PAF (PAROXYSMAL ATRIAL FIBRILLATION) (HCC): Status: ACTIVE | Noted: 2019-12-11

## 2022-03-19 PROBLEM — G47.00 PERSISTENT DISORDER OF INITIATING OR MAINTAINING SLEEP: Status: ACTIVE | Noted: 2019-08-22

## 2022-08-03 ENCOUNTER — TELEPHONE (OUTPATIENT)
Dept: CARDIOLOGY CLINIC | Age: 67
End: 2022-08-03

## 2022-08-03 NOTE — TELEPHONE ENCOUNTER
Please call and advise. If no answer, you can leave a message.      Cardiac Clearance        Physician or Practice Requesting:   : Lotus Sebastian Phone Number: [phone]  Fax Number: [phone]  Date of Surgery/Procedure: August 8th  Type of Surgery or Procedure: Breast Biopsy   Type of Anesthesia: NONE  Type of Clearance Requested: Medication Hold Only  Medication to Hold: eliquis  Days to Hold: 3 days

## 2022-08-03 NOTE — TELEPHONE ENCOUNTER
LVM stating per [facility] Guidelines for Pre-Procedure Anticoagulation Management:  Ok to hold Elqiuis 24-48 hours prior to procedure. Restart asap post procedure, when safe to do so. Will fax this note as requested. [facility] phone number given, prn.    Faxed copy of this note to [phone]. [facility]

## 2022-08-08 RX ORDER — ATORVASTATIN CALCIUM 20 MG/1
20 TABLET, FILM COATED ORAL DAILY
Qty: 90 TABLET | Refills: 3 | Status: SHIPPED | OUTPATIENT
Start: 2022-08-08 | End: 2022-09-01 | Stop reason: SDUPTHER

## 2022-08-08 NOTE — TELEPHONE ENCOUNTER
MEDICATION REFILL REQUEST      Name of Medication:  Atorvastatin  Dose:  20 mg  Frequency:  1 a day  Quantity:  90  Days' supply:  80 with refills      Pharmacy Name/Location:  [phone]

## 2022-08-31 NOTE — TELEPHONE ENCOUNTER
MEDICATION REFILL REQUEST      Name of Medication:  Atorvastatin  Dose:  20 mg  Frequency:  1 a day  Quantity:  ?  Days' supply:  ?       Pharmacy Name/Location:  [id number]

## 2022-08-31 NOTE — TELEPHONE ENCOUNTER
Requested Prescriptions     Pending Prescriptions Disp Refills    atorvastatin (LIPITOR) 20 MG tablet 90 tablet 3     Sig: Take 1 tablet by mouth daily

## 2022-09-01 ENCOUNTER — TELEPHONE (OUTPATIENT)
Dept: CARDIOLOGY CLINIC | Age: 67
End: 2022-09-01

## 2022-09-01 RX ORDER — ATORVASTATIN CALCIUM 20 MG/1
20 TABLET, FILM COATED ORAL DAILY
Qty: 90 TABLET | Refills: 3 | Status: SHIPPED | OUTPATIENT
Start: 2022-09-01

## 2022-09-02 NOTE — TELEPHONE ENCOUNTER
Called [facility]. Spoke with pharmacist who stated does not need anything at this time. They received the Rx patient was needing.

## 2022-10-19 NOTE — PROGRESS NOTES
New Patient Abstract    Reason for Referral: Breast cancer    Referring Provider:  Huong Desir MD    Primary Care Provider: Isabella Bull MD    Family History of Cancer/Hematologic Disorders: Family history is significant for mother with pancreatic cancer. Presenting Symptoms: Abnormal routine screening mammogram     Narrative with recent with Results/Procedures/Biopsies and Dates completed: Ms. Indy Coyle is a 55-year-old white female with a history of atrial fibrillation, CAD, CHF, DM2, HLD, SHIRLEY, severe cardiomyopathy, ICD, chronic anticoagulation with Eliquis, insomnia, partial thyroidectomy, left heart cath, cardioversion, and ADH s/p right breast excisional biopsy on 8/9/2018. She initially presented to [facility] for routine bilateral screening mammogram on 6/30/22 which identified a series of nodules with possible associated spiculation at the 9:00 position in the right breast. Further evaluation with diagnostic digital mammogram and right breast ultrasound were completed on 7/28/22 confirming suspicious focal irregular nodular densities with distortion extending 8 to 10 cm from the nipple on the 9 -10:00 position of the right breast, together measuring approximately 3 cm in AP diameter. 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 Patient was unable to have MRI due to her ICD device. She was referred to surgery and evaluated in consultation by Surgeon, Dr. Lionel Sidhu, on 8/31/22. She was assessed with signs and symptoms of right breast DCIS, papillary pattern, stage 0 and recommended for tag localization lumpectomy. Genetic testing with [facility] diagnostic testing was negative on 9/2/22. Patient was seen by Radiation Oncology to discuss postoperative radiation as she had concerns about radiation with her cardiomyopathy.  Per Dr. Radha Kruger note, Radiation Oncology felt that this could be managed without undue risk. Tag placement was done at [facility] on 9/9/22. On 9/15/22 she underwent right breast lumpectomy with pathology revealing low grade, ductal carcinoma in-situ, less than 1 mm to posterior and lateral lumpectomy margins. The anterior right breast re-excise margin was positive for DCIS. Re-excision lumpectomy of the anterior margin was completed on 9/29/22 with pathology revealing ductal carcinoma in situ (low grade) extending into new re-excision margin (positive margin). Patient followed up with Dr. Russell Gardiner on 10/5/22 to discuss options which he felt included: proceeding with radiation even with a positive margin although low-grade DCIS only due to her high operative risk based on severe cardiomyopathy; mastectomy; or additional re-excision lumpectomy which would require excision of a fairly significant area of skin producing some cosmetic defect but strong likelihood of a negative margin. Also discussed was seeking medical oncology opinion particularly in view of consideration of simple radiation and consideration of estrogen blockade as an alternative for further surgery. Referral was subsequently placed to [facility], per surgery, for Medical Oncology evaluation and further treatment recommendations.      MG SCREENING DIGITAL MAMMOGRAM WITH CAD AND WILLIAM 6/30/22        MG-DIAGNOSTIC DIGITAL MAMMOGRAM WITH WILLIAM, RIGHT 7/28/22        SURGICAL PATHOLOGY REPORT 8/22/22  FINAL DIAGNOSIS        Right breast, upper outer quadrant posterior, stereotactic core biopsy:  Ductal carcinoma in-situ with solid papillary pattern, see comment   Addendum     Breast Profile by Image Analysis  Estrogen Receptor:  Intensity: strong  Percent positive: 97.4%  Status of internal controls: Present and stain as expected  Interpretation: POSITIVE     [facility] DIAGNOSTIC TESTING RESULTS (BRCA ½ PANEL, MULTI-CANCER + RNA PANEL, AND MULTI-CANCER GENES ELIGIBLE FOR RNA ANALYSIS) 9/2/22      SURGICAL PATHOLOGY REPORT 9/15/22  FINAL DIAGNOSIS        A. Right breast, lumpectomy:  Ductal carcinoma in-situ, low grade, less than 1 mm to posterior and lateral lumpectomy margins; see specimens B through G for final margin status  See synoptic report    B. Right breast, medial margin, re-excision:  Benign breast tissue; margin negative for carcinoma    C. Right breast, inferior margin, re-excision:  Benign breast tissue; margin negative for carcinoma    D. Right breast, lateral margin, re-excision:  Benign breast tissue; margin negative for carcinoma    E. Right breast, superior margin, re-excision:  Benign breast tissue; margin negative for carcinoma    F. Right breast, posterior margin, re-excision:  Benign breast tissue; margin negative for carcinoma    G. Right breast, anterior margin, re-excision:  Ductal carcinoma in-situ extending to the final true margin (positive margin)     SURGICAL PATHOLOGY REPORT 9/29/22  FINAL DIAGNOSIS        Breast, right, anterior margin, re-excision:  Ductal carcinoma in situ (low grade) extending into new re-excision margin (positive margin). Notes from Referring Provider: None    Other Pertinent Information:         Presented at Tumor Board:  No

## 2022-10-20 ENCOUNTER — OFFICE VISIT (OUTPATIENT)
Dept: ONCOLOGY | Age: 67
End: 2022-10-20
Payer: MEDICARE

## 2022-10-20 VITALS
BODY MASS INDEX: 30.84 KG/M2 | HEART RATE: 59 BPM | OXYGEN SATURATION: 97 % | SYSTOLIC BLOOD PRESSURE: 97 MMHG | HEIGHT: 68 IN | DIASTOLIC BLOOD PRESSURE: 63 MMHG | TEMPERATURE: 99.8 F | WEIGHT: 203.5 LBS | RESPIRATION RATE: 16 BRPM

## 2022-10-20 DIAGNOSIS — D05.11 DUCTAL CARCINOMA IN SITU (DCIS) OF RIGHT BREAST: Primary | ICD-10-CM

## 2022-10-20 PROCEDURE — 3017F COLORECTAL CA SCREEN DOC REV: CPT | Performed by: INTERNAL MEDICINE

## 2022-10-20 PROCEDURE — G8400 PT W/DXA NO RESULTS DOC: HCPCS | Performed by: INTERNAL MEDICINE

## 2022-10-20 PROCEDURE — G8417 CALC BMI ABV UP PARAM F/U: HCPCS | Performed by: INTERNAL MEDICINE

## 2022-10-20 PROCEDURE — 1090F PRES/ABSN URINE INCON ASSESS: CPT | Performed by: INTERNAL MEDICINE

## 2022-10-20 PROCEDURE — 1036F TOBACCO NON-USER: CPT | Performed by: INTERNAL MEDICINE

## 2022-10-20 PROCEDURE — 1123F ACP DISCUSS/DSCN MKR DOCD: CPT | Performed by: INTERNAL MEDICINE

## 2022-10-20 PROCEDURE — G8427 DOCREV CUR MEDS BY ELIG CLIN: HCPCS | Performed by: INTERNAL MEDICINE

## 2022-10-20 PROCEDURE — G8484 FLU IMMUNIZE NO ADMIN: HCPCS | Performed by: INTERNAL MEDICINE

## 2022-10-20 PROCEDURE — 99205 OFFICE O/P NEW HI 60 MIN: CPT | Performed by: INTERNAL MEDICINE

## 2022-10-20 RX ORDER — EMPAGLIFLOZIN 10 MG/1
10 TABLET, FILM COATED ORAL
COMMUNITY
Start: 2022-09-16

## 2022-10-20 NOTE — PROGRESS NOTES
3 [facility] Hematology and Oncology: New Patient - Consultation    Chief Complaint   Patient presents with    New Patient     Reason for Referral: Breast cancer  Referring Provider:  John Durham MD  Primary Care Provider: Judd Herrera MD  Family History of Cancer/Hematologic Disorders: Family history is significant for mother with pancreatic cancer. Presenting Symptoms: Abnormal routine screening mammogram     History of Present Illness:  Ms. Ross Nix is a 69 y.o. female who presents today in referral from Dr. Dino Garcia for consultation regarding breast cancer. The past medical history is significant for atrial fibrillation, CAD, CHF, DM2, HLD, SHIRLEY, severe cardiomyopathy, ICD, chronic anticoagulation with Eliquis, insomnia, partial thyroidectomy, left heart cath, cardioversion, and ADH s/p right breast excisional biopsy on 8/9/2018. She initially presented to [facility] for routine bilateral screening mammogram on 6/30/22 which identified a series of nodules with possible associated spiculation at the 9:00 position in the right breast.  Further evaluation with diagnostic digital mammogram and right breast ultrasound were completed on 7/28/22 confirming suspicious focal irregular nodular densities with distortion extending 8 to 10 cm from the nipple on the 9 -10:00 position of the right breast, together measuring approximately 3 cm in AP diameter. 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 Patient was unable to have MRI due to her ICD device. She was referred to surgery and evaluated in consultation by Surgeon, Dr. Adilene Sosa, on 8/31/22. She was assessed with signs and symptoms of right breast DCIS, papillary pattern, stage 0 and recommended for tag localization lumpectomy. Genetic testing with [facility] diagnostic testing was negative on 9/2/22.   Patient was seen by Radiation Oncology to discuss postoperative radiation as she had concerns about radiation with her cardiomyopathy. Per Dr. Fabián Borja note, Radiation Oncology felt that this could be managed without undue risk. Tag placement was done at [facility] on 9/9/22. On 9/15/22 she underwent right breast lumpectomy with pathology revealing low grade, ductal carcinoma in-situ, less than 1 mm to posterior and lateral lumpectomy margins. The anterior right breast re-excise margin was positive for DCIS. Re-excision lumpectomy of the anterior margin was completed on 9/29/22 with pathology revealing ductal carcinoma in situ (low grade) extending into new re-excision margin (positive margin). Patient followed up with Dr. Padma Graham on 10/5/22 to discuss options which he felt included: proceeding with radiation even with a positive margin although low-grade DCIS only due to her high operative risk based on severe cardiomyopathy; mastectomy; or additional re-excision lumpectomy which would require excision of a fairly significant area of skin producing some cosmetic defect but strong likelihood of a negative margin. Also discussed was seeking medical oncology opinion particularly in view of consideration of simple radiation and consideration of estrogen blockade as an alternative for further surgery. Referral was subsequently placed to , per surgery, for Medical Oncology evaluation and further treatment recommendations. Pt here with her . Today, she is here for consultation. During today's visit we discussed the pathophysiology of breast cancer, staging, and the importance of receptor status in terms of treatment options. We then reviewed her medical history as well as oncologic history, recent imaging and pathology in detail. We discussed next plans.       Chronological events:   MG SCREENING DIGITAL MAMMOGRAM WITH CAD AND WILLIAM 6/30/22     MG-DIAGNOSTIC DIGITAL MAMMOGRAM WITH WILLIAM, RIGHT 7/28/22 SURGICAL PATHOLOGY REPORT 8/22/22        FINAL DIAGNOSIS          Right breast, upper outer quadrant posterior, stereotactic core biopsy:  Ductal carcinoma in-situ with solid papillary pattern, see comment    Addendum      Breast Profile by Image Analysis  Estrogen Receptor:  Intensity: strong  Percent positive: 97.4%  Status of internal controls: Present and stain as expected  Interpretation: POSITIVE      [facility] DIAGNOSTIC TESTING RESULTS (BRCA ½ PANEL, MULTI-CANCER + RNA PANEL, AND MULTI-CANCER GENES ELIGIBLE FOR RNA ANALYSIS) 9/2/22     SURGICAL PATHOLOGY REPORT 9/15/22  FINAL DIAGNOSIS         A. Right breast, lumpectomy:  Ductal carcinoma in-situ, low grade, less than 1 mm to posterior and lateral lumpectomy margins; see specimens B through G for final margin status  See synoptic report    B. Right breast, medial margin, re-excision:  Benign breast tissue; margin negative for carcinoma    C. Right breast, inferior margin, re-excision:  Benign breast tissue; margin negative for carcinoma    D. Right breast, lateral margin, re-excision:  Benign breast tissue; margin negative for carcinoma    E. Right breast, superior margin, re-excision:  Benign breast tissue; margin negative for carcinoma    F. Right breast, posterior margin, re-excision:  Benign breast tissue; margin negative for carcinoma    G. Right breast, anterior margin, re-excision:  Ductal carcinoma in-situ extending to the final true margin (positive margin)      SURGICAL PATHOLOGY REPORT 9/29/22  FINAL DIAGNOSIS         Breast, right, anterior margin, re-excision:  Ductal carcinoma in situ (low grade) extending into new re-excision margin (positive margin).       10/20/22 heme/onc consultation       Family History   Problem Relation Age of Onset    Hypertension Mother     Dementia Father     Cancer Mother         pancreatic      Social History     Socioeconomic History    Marital status:      Spouse name: None    Number of children: None Years of education: None    Highest education level: None   Tobacco Use    Smoking status: Never    Smokeless tobacco: Never   Substance and Sexual Activity    Alcohol use: Not Currently    Drug use: No        Review of Systems   Constitutional:  Negative for appetite change, chills, diaphoresis, fatigue, fever and unexpected weight change. HENT:   Negative for hearing loss, mouth sores, nosebleeds, sore throat, trouble swallowing and voice change. Eyes:  Negative for icterus. Respiratory:  Negative for chest tightness, hemoptysis, shortness of breath and wheezing. Cardiovascular:  Negative for chest pain, leg swelling and palpitations. Gastrointestinal:  Negative for abdominal distention, abdominal pain, blood in stool, constipation, diarrhea, nausea and vomiting. Endocrine: Negative for hot flashes. Genitourinary:  Negative for difficulty urinating, frequency, vaginal bleeding and vaginal discharge. Musculoskeletal:  Negative for arthralgias, flank pain, gait problem and myalgias. Skin:  Negative for itching, rash and wound. Neurological:  Negative for dizziness, extremity weakness, gait problem, headaches and numbness. Psychiatric/Behavioral:  Negative for confusion and depression. The patient is not nervous/anxious. No Known Allergies  Past Medical History:   Diagnosis Date    Arrhythmia     Atrial fibrillation ([facility] [address] 3/10/2016    cardioversion 4/2016    CAD (coronary artery disease)     pt states she had a \"mild heart attack\".  no PCI    Chronic systolic CHF (congestive heart failure) ([facility] [address] 6/24/2015    Diabetes ([facility] [address]     pre-diabetic    GERD (gastroesophageal reflux disease)     Heart failure ([facility] [address]     Hyperlipidemia 3/10/2016    ICD (implantable cardioverter-defibrillator) lead failure 6/24/2015    Insomnia w/ sleep apnea 1/11/2016    Nausea & vomiting     Nocturnal hypoxemia 1/11/2016    SHIRLEY (obstructive sleep apnea) 1/11/2016    cpap    Sleep apnea     Thyroid disease      Past Surgical History:   Procedure Laterality Date    HEENT      partial thyroidectomy    PACEMAKER      ICD    GA CARDIAC SURG PROCEDURE UNLIST      L[facility] - Cardioversion 5/16      Current Outpatient Medications   Medication Sig Dispense Refill    JARDIANCE 10 MG tablet 10 mg      atorvastatin (LIPITOR) 20 MG tablet Take 1 tablet by mouth daily 90 tablet 3    apixaban (ELIQUIS) 5 MG TABS tablet Take 5 mg by mouth 2 times daily      levothyroxine (SYNTHROID) 88 MCG tablet Take by mouth every morning (before breakfast)      metoprolol succinate (TOPROL XL) 25 MG extended release tablet 50 mg 1 qam then 25 mg qhs      omeprazole (PRILOSEC) 20 MG delayed release capsule Take 20 mg by mouth daily      sacubitril-valsartan (ENTRESTO) 24-26 MG per tablet Take 1 tablet by mouth 2 times daily      spironolactone (ALDACTONE) 25 MG tablet Take 25 mg by mouth daily      sotalol (BETAPACE) 120 MG tablet Take 1 tablet by mouth in the morning and 1 tablet in the evening. 180 tablet 3     No current facility-administered medications for this visit. No flowsheet data found. OBJECTIVE:  BP 97/63   Pulse 59   Temp 99.8 °F (37.7 °C)   Resp 16   Ht 5' 7.5\" (1.715 m)   Wt 203 lb 8 oz (92.3 kg)   SpO2 97%   BMI 31.40 kg/m²       ECOG PERFORMANCE STATUS - 1- Restricted in physically strenuous activity but ambulatory and able to carry out work of a light or sedentary nature such as light house work, office work. Pain - 0 - No pain/10. None/Minimal pain - not affecting QOL     Fatigue - No flowsheet data found. Distress - No flowsheet data found. Physical Exam  Vitals reviewed. Exam conducted with a chaperone present. Constitutional:       General: She is not in acute distress. Appearance: Normal appearance. She is not ill-appearing or toxic-appearing. HENT:      Head: Normocephalic and atraumatic.       Nose: Nose normal.      Mouth/Throat:      Mouth: Mucous membranes are moist.   Eyes:      General: No scleral icterus. Extraocular Movements: Extraocular movements intact. Conjunctiva/sclera: Conjunctivae normal.      Pupils: Pupils are equal, round, and reactive to light. Cardiovascular:      Rate and Rhythm: Normal rate and regular rhythm. Heart sounds: No murmur heard. Pulmonary:      Effort: Pulmonary effort is normal. No respiratory distress. Breath sounds: Normal breath sounds. No wheezing or rales. Abdominal:      General: There is no distension. Palpations: Abdomen is soft. Musculoskeletal:         General: Normal range of motion. Cervical back: Normal range of motion. Right lower leg: No edema. Left lower leg: No edema. Lymphadenopathy:      Cervical: No cervical adenopathy. Upper Body:      Right upper body: No supraclavicular or axillary adenopathy. Left upper body: No supraclavicular or axillary adenopathy. Skin:     General: Skin is warm and dry. Coloration: Skin is not jaundiced or pale. Findings: No rash. Neurological:      General: No focal deficit present. Mental Status: She is alert and oriented to person, place, and time. Gait: Gait normal.   Psychiatric:         Behavior: Behavior normal.         Thought Content: Thought content normal.        Labs:  No results found for this or any previous visit (from the past 168 hour(s)). Imaging: reviewed     PATHOLOGY:        ASSESSMENT:     Diagnosis Orders   1. Ductal carcinoma in situ (DCIS) of right breast  [facility] - Jermaine Shahid MD, Radiation Oncology, [place]          Ms. Klaus Ashraf is here for evaluation of breast cancer.       DCIS - ER97.4%, solid papillary pattern   - routine screening mammogram 6/30/22 - series of nodules with possible associated spiculation at the 9:00 position in the right breast.    - diagnostic right mammogram 7/2022 - suspicious focal irregular nodular densities with distortion extending 8 to 10 cm from the nipple on the 9-10:00 position of the right breast, together measuring approximately 3 cm in AP diameter.    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 - unable to have MRI due to ICD device   - referred to surgery and evaluated in consultation by Surgeon, Dr. Jose Elias Asher, on 8/31/22.    - genetic testing [facility] 9/2022 - negative   - saw XRT preop and felt that she could p/w lumpectomy followed by XRT   - 9/15/22 lumpectomy - low grade DCIS, margin <1mm - posterior and lateral   9/29/22 re-excision margin - again DCIS extending into new re-excision margin     - she is here for discussion / opinion of her options. - Patients with DCIS undergo local treatment with either mastectomy or BCT followed by adjuvant radiation (unless low-risk disease). The decision to administer endocrine therapy for risk reduction of subsequent cancers depends on choice of local therapy and receptor status. Five years of endocrine therapy is offered to women with ER/MA positive disease who undergo unilateral mastectomy or BCT. - The primary role of systemic therapy is risk reduction of invasive breast cancer in ipsi/contralateral breast.  Chemotherapy plays no role in management of DCIS. About 50-75% of DCIS lesions express ER/MA. Tamoxifen is approved for adjuvant therapy to prevent invasive breast cancer recurrence in women with DCIS, but AI is also an acceptable option. Trials have shown that postoperative endocrine therapy with/without adjuvant RT, is effective in reducing risk of DCIS/invasive disease without survival benefit for women treated with BCT with ER/MA positive diease. Chemoprevention does not prevent ER negative disease.    - Side effects include endometrial cancer, thromboembolic events, fatigue, joint stiffness and arthritis, hot flashes and mood changes to name a few.     - we reviewed that she has options of resection of the margin per sx assessment (but she is aware that this may/may not be final as she could have a positive margin), mastectomy (if unilateral, enod therapy would still be recommended) or XRT/endocrine therapy. Her surgical risk is considered high due to cardiomyopathy/ICD device/repeat anesthesia. DCIS is on the right, therefore, the risk of cardiac damage by radiation is lower than If the disease was on the left - she knows that ultimately I defer these questions to XRT. - plan for tumor board discussion   - refer back to Dr Law Khan so aware of new dx and eval prior to interventions, will plan for echo     2. Surveillance  -H&P every 3-6 months for first 3 years, then every 6-12 months for the next 2 years and then annually. She was counseled on how to perform monthly breast self-examination. RESUSCITATION DIRECTIVES/HOSPICE CARE: Full Support    RTC for VV or after local therapy or sooner as needed   [60min - chart review, review of results and discussion of options, next steps, coordination of care and charting and communication with other providers ]      MDM  Number of Diagnoses or Management Options  Ductal carcinoma in situ (DCIS) of right breast: established, improving     Amount and/or Complexity of Data Reviewed  Clinical lab tests: reviewed  Tests in the radiology section of CPT®: reviewed  Review and summarize past medical records: yes  Discuss the patient with other providers: yes  Independent visualization of images, tracings, or specimens: yes    Risk of Complications, Morbidity, and/or Mortality  Presenting problems: moderate  Diagnostic procedures: moderate  Management options: high        Lab studies and imaging studies were personally reviewed. Pertinent old records were reviewed. All questions were asked and answered to the best of my ability. The patient verbalized understanding and agrees with the plan above. Documentation was sent to Dr. Candice Jackson for continuation of care.   Thank you,  Yusuf Patel, for the courtesy of this referral.        [place] Diana Duggan) [facility] Hematology and Oncology  [facility] [address] [facility]  Office : [phone]  Fax : [phone]

## 2022-10-20 NOTE — PATIENT INSTRUCTIONS
Patient Instructions from Today's Visit    Reason for Visit:  New patient visit for DCIS      Plan:    You had ductal carcinoma in-situ, which means the cancer was contained in the duct of the breast.    There are 3 receptors on the breast cancer cells - estrogen receptor, progesterone receptor, and YBX9iva receptor. Yours was positive for estrogen and progesterone (ER/ME+). This means that your tumor depended on these hormones to grow. Since you had DCIS, this is not checked for HER2. Typically for DCIS, we recommend surgery (you got a lumpectomy), radiation, and anti-hormonal/endocrine therapy. This is a pill you take daily to try to prevent recurrent disease. The question is if all of the cancer cells have been removed or not since the surgeon had positive margins after lumpectomy. Tentatively scheduled for surgery next week to remove more tissue or mastectomy. Unable to do MRI due to ICD. Plan: you will delay surgery scheduled next week. Will refer you to our radiation oncologists, Dr. Vladislav Bruno or Dr. Lynette Walker. Dr. Carol Baird will present you to tumor board next Thursday. Follow Up:  - virtual visit in 2 weeks    Recent Lab Results:      Treatment Summary has been discussed and given to patient: n/a        -------------------------------------------------------------------------------------------------------------------  Please call our office at [phone] if you have any  of the following symptoms:   Fever of 100.5 or greater  Chills  Shortness of breath  Swelling or pain in one leg    After office hours an answering service is available and will contact a provider for emergencies or if you are experiencing any of the above symptoms. Patient did express an interest in My Chart. My Chart log in information explained on the after visit summary printout at the [facility] [address] desk.     Nara Jacobsen RN

## 2022-10-24 NOTE — TELEPHONE ENCOUNTER
Requested Prescriptions     Pending Prescriptions Disp Refills    sotalol (BETAPACE) 120 MG tablet 180 tablet 3     Sig: Take 1 tablet by mouth in the morning and 1 tablet in the evening.

## 2022-10-24 NOTE — TELEPHONE ENCOUNTER
MEDICATION REFILL REQUEST      Name of Medication:  Sotalol  Dose:  120mg  Frequency:  2 pills a day  Quantity:  60  Days' supply:  30 with refills      Pharmacy Name/Location:  [id number]

## 2022-10-25 ENCOUNTER — HOSPITAL ENCOUNTER (OUTPATIENT)
Dept: LAB | Age: 67
Setting detail: SPECIMEN
Discharge: HOME | End: 2022-10-28

## 2022-10-25 RX ORDER — SOTALOL HYDROCHLORIDE 120 MG/1
120 TABLET ORAL EVERY 12 HOURS
Qty: 180 TABLET | Refills: 3 | Status: SHIPPED | OUTPATIENT
Start: 2022-10-25

## 2022-10-27 ENCOUNTER — OFFICE VISIT (OUTPATIENT)
Dept: CARDIOLOGY CLINIC | Age: 67
End: 2022-10-27
Payer: MEDICARE

## 2022-10-27 VITALS
DIASTOLIC BLOOD PRESSURE: 50 MMHG | HEART RATE: 58 BPM | WEIGHT: 205 LBS | HEIGHT: 69 IN | SYSTOLIC BLOOD PRESSURE: 84 MMHG | BODY MASS INDEX: 30.36 KG/M2

## 2022-10-27 DIAGNOSIS — I48.0 PAF (PAROXYSMAL ATRIAL FIBRILLATION) (HCC): Primary | ICD-10-CM

## 2022-10-27 DIAGNOSIS — E78.00 PURE HYPERCHOLESTEROLEMIA: ICD-10-CM

## 2022-10-27 DIAGNOSIS — I50.22 CHRONIC SYSTOLIC CHF (CONGESTIVE HEART FAILURE) (HCC): ICD-10-CM

## 2022-10-27 PROCEDURE — G8417 CALC BMI ABV UP PARAM F/U: HCPCS | Performed by: INTERNAL MEDICINE

## 2022-10-27 PROCEDURE — 1090F PRES/ABSN URINE INCON ASSESS: CPT | Performed by: INTERNAL MEDICINE

## 2022-10-27 PROCEDURE — G8400 PT W/DXA NO RESULTS DOC: HCPCS | Performed by: INTERNAL MEDICINE

## 2022-10-27 PROCEDURE — 3017F COLORECTAL CA SCREEN DOC REV: CPT | Performed by: INTERNAL MEDICINE

## 2022-10-27 PROCEDURE — 1036F TOBACCO NON-USER: CPT | Performed by: INTERNAL MEDICINE

## 2022-10-27 PROCEDURE — 1123F ACP DISCUSS/DSCN MKR DOCD: CPT | Performed by: INTERNAL MEDICINE

## 2022-10-27 PROCEDURE — G8428 CUR MEDS NOT DOCUMENT: HCPCS | Performed by: INTERNAL MEDICINE

## 2022-10-27 PROCEDURE — 99214 OFFICE O/P EST MOD 30 MIN: CPT | Performed by: INTERNAL MEDICINE

## 2022-10-27 PROCEDURE — G8484 FLU IMMUNIZE NO ADMIN: HCPCS | Performed by: INTERNAL MEDICINE

## 2022-10-27 NOTE — PROGRESS NOTES
[facility] CARDIOLOGY  [address], [address]  PHONE: [phone]      10/27/22    NAME:  Carol Cruz  : 1955  MRN: [id number]       SUBJECTIVE:   Carol Cruz is a 69 y.o. female seen for a follow up visit regarding the following:     Chief Complaint   Patient presents with    Atrial Fibrillation         HPI:  Diagnosed with breast ca. No cp or kirby. No orthopnea or pnd. No palpitations or syncope. Past Medical History, Past Surgical History, Family history, Social History, and Medications were all reviewed with the patient today and updated as necessary. Current Outpatient Medications   Medication Sig Dispense Refill    sotalol (BETAPACE) 120 MG tablet Take 1 tablet by mouth in the morning and 1 tablet in the evening. 180 tablet 3    JARDIANCE 10 MG tablet 10 mg      atorvastatin (LIPITOR) 20 MG tablet Take 1 tablet by mouth daily 90 tablet 3    apixaban (ELIQUIS) 5 MG TABS tablet Take 5 mg by mouth 2 times daily      levothyroxine (SYNTHROID) 88 MCG tablet Take by mouth every morning (before breakfast)      metoprolol succinate (TOPROL XL) 25 MG extended release tablet 50 mg 1 qam then 25 mg qhs      omeprazole (PRILOSEC) 20 MG delayed release capsule Take 20 mg by mouth daily      sacubitril-valsartan (ENTRESTO) 24-26 MG per tablet Take 1 tablet by mouth 2 times daily      spironolactone (ALDACTONE) 25 MG tablet Take 25 mg by mouth daily       No current facility-administered medications for this visit. Social History     Tobacco Use    Smoking status: Never    Smokeless tobacco: Never   Substance Use Topics    Alcohol use: Not Currently              PHYSICAL EXAM:   BP (!) 84/50   Pulse 58   Ht 5' 9\" (1.753 m)   Wt 205 lb (93 kg)   BMI 30.27 kg/m²    Constitutional: Oriented to person, place, and time. Appears well-developed and well-nourished. Head: Normocephalic and atraumatic. Neck: Neck supple.    Cardiovascular: Normal rate and regular rhythm with no murmur -No JVP  Pulmonary/Chest: Breath sounds normal.   Abdominal: Soft. Musculoskeletal: No edema. Neurological: Alert and oriented to person, place, and time. Skin: Skin is warm and dry. Psychiatric: Normal mood and affect. Vitals reviewed. Wt Readings from Last 3 Encounters:   10/27/22 205 lb (93 kg)   10/25/22 203 lb (92.1 kg)   10/20/22 203 lb 8 oz (92.3 kg)       Medical problems and test results were reviewed with the patient today. ASSESSMENT and PLAN    1. PAF (paroxysmal atrial fibrillation) (HCC)  Stable. Continue eliquis      2. Chronic systolic CHF (congestive heart failure) (HCC)  Stable. Continue toprol and entresto  Ef increased to 35    3. Pure hypercholesterolemia  Stable. Continue lipitor     4. Preop  Moderate cv risk- ok to hold eliquis 2 days before    Return in about 6 months (around 4/27/2023).          Brionna Wiggins MD  10/27/2022  9:56 AM

## 2022-11-01 ASSESSMENT — ENCOUNTER SYMPTOMS
BLOOD IN STOOL: 0
CONSTIPATION: 0
TROUBLE SWALLOWING: 0
WHEEZING: 0
SORE THROAT: 0
ABDOMINAL PAIN: 0
CHEST TIGHTNESS: 0
HEMOPTYSIS: 0
ABDOMINAL DISTENTION: 0
SCLERAL ICTERUS: 0
VOICE CHANGE: 0
DIARRHEA: 0
NAUSEA: 0
SHORTNESS OF BREATH: 0
VOMITING: 0

## 2022-11-02 ASSESSMENT — ENCOUNTER SYMPTOMS
CONSTIPATION: 0
NAUSEA: 0
VOMITING: 0
SORE THROAT: 0
ABDOMINAL DISTENTION: 0
SCLERAL ICTERUS: 0
SHORTNESS OF BREATH: 0
CHEST TIGHTNESS: 0
DIARRHEA: 0
WHEEZING: 0
TROUBLE SWALLOWING: 0
VOICE CHANGE: 0
BLOOD IN STOOL: 0
HEMOPTYSIS: 0
ABDOMINAL PAIN: 0

## 2022-11-02 NOTE — PROGRESS NOTES
Patient has requested an audio/video evaluation for the following concern(s):    Pt was evaluated through a synchronous (real-time) audio-video encounter. The patient (or guardian if applicable) is aware that this is a billable service, which includes applicable co-pays. This Virtual Visit was conducted with patient's (and/or legal guardian's) consent. The visit was conducted pursuant to the emergency declaration under the [facility], [address] [place] [address] and the [employer] Act. Patient identification was verified, and a caregiver was present when appropriate. The patient was located at Other: home  Provider was located at [facility] ([place] [address]): [address]   [address] [facility],  [address]. Total time spent on this encounter: 22min   [chart review, review of results and discussion of options, next steps, coordination of care and charting ]      [facility] Hematology and Oncology: Established patient - follow up     Chief Complaint   Patient presents with    Follow-up     Reason for Referral: Breast cancer  Referring Provider:  Darryl Mueller MD  Primary Care Provider: Soledad Mahan MD  Family History of Cancer/Hematologic Disorders: Family history is significant for mother with pancreatic cancer. Presenting Symptoms: Abnormal routine screening mammogram     History of Present Illness:  Ms. Terry Glass is a 69 y.o. female who presents today for follow up regarding breast cancer. The past medical history is significant for atrial fibrillation, CAD, CHF, DM2, HLD, SHIRLEY, severe cardiomyopathy, ICD, chronic anticoagulation with Eliquis, insomnia, partial thyroidectomy, left heart cath, cardioversion, and ADH s/p right breast excisional biopsy on 8/9/2018.   She initially presented to [facility] for routine bilateral screening mammogram on 6/30/22 which identified a series of nodules with possible associated spiculation at the 9:00 position in the right breast.  Further evaluation with diagnostic digital mammogram and right breast ultrasound were completed on 7/28/22 confirming suspicious focal irregular nodular densities with distortion extending 8 to 10 cm from the nipple on the 9 -10:00 position of the right breast, together measuring approximately 3 cm in AP diameter. 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 Patient was unable to have MRI due to her ICD device. She was referred to surgery and evaluated in consultation by Surgeon, Dr. Kenia Hdez, on 8/31/22. She was assessed with signs and symptoms of right breast DCIS, papillary pattern, stage 0 and recommended for tag localization lumpectomy. Genetic testing with [facility] diagnostic testing was negative on 9/2/22. Patient was seen by Radiation Oncology to discuss postoperative radiation as she had concerns about radiation with her cardiomyopathy. Per Dr. Sam Parent note, Radiation Oncology felt that this could be managed without undue risk. Tag placement was done at [facility] on 9/9/22. On 9/15/22 she underwent right breast lumpectomy with pathology revealing low grade, ductal carcinoma in-situ, less than 1 mm to posterior and lateral lumpectomy margins. The anterior right breast re-excise margin was positive for DCIS. Re-excision lumpectomy of the anterior margin was completed on 9/29/22 with pathology revealing ductal carcinoma in situ (low grade) extending into new re-excision margin (positive margin).   Patient followed up with Dr. Rekha Barreto on 10/5/22 to discuss options which he felt included: proceeding with radiation even with a positive margin although low-grade DCIS only due to her high operative risk based on severe cardiomyopathy; mastectomy; or additional re-excision lumpectomy which would require excision of a fairly significant area of skin producing some cosmetic defect but strong likelihood of a negative margin. Also discussed was seeking medical oncology opinion particularly in view of consideration of simple radiation and consideration of estrogen blockade as an alternative for further surgery. Referral was subsequently placed to [facility], per surgery, for Medical Oncology evaluation and further treatment recommendations. Pt was here with her . Has a dog named Aranza. At consultation, we discussed the pathophysiology of breast cancer, staging, and the importance of receptor status in terms of treatment options. We then reviewed her medical history as well as oncologic history, recent imaging and pathology in detail. We discussed next plans. Today, pt is here for virtual visit to discuss tumor board review. Ultimately patient would be best served by mastectomy, in the interim, she has met with Dr. Law Khan (cardiology) and discussed her case with [facility] and has come to the same conclusion on her own. Her surgical risk has been modified to moderate by Dr. Law Khan and she had a recent echo with an increase in EF. She has made a decision to proceed with mastectomy and wishes to do that with Dr. Rekha Barreto. She will let us know when the surgery date is after she communicates that to him. Did recommend that she checks ER/IL on the sample if remains DCIS. IHC will be checked if  her final pathology suggests invasive disease rather than that just DCIS. She verbalized understanding and took some notes. Her  was present for the discussion.       Chronological events:   MG SCREENING DIGITAL MAMMOGRAM WITH CAD AND WILLIAM 6/30/22     MG-DIAGNOSTIC DIGITAL MAMMOGRAM WITH WILLIAM, RIGHT 7/28/22     SURGICAL PATHOLOGY REPORT 8/22/22        FINAL DIAGNOSIS          Right breast, upper outer quadrant posterior, stereotactic core biopsy:  Ductal carcinoma in-situ with solid papillary pattern, see comment Addendum      Breast Profile by Image Analysis  Estrogen Receptor:  Intensity: strong  Percent positive: 97.4%  Status of internal controls: Present and stain as expected  Interpretation: POSITIVE      [facility] DIAGNOSTIC TESTING RESULTS (BRCA ½ PANEL, MULTI-CANCER + RNA PANEL, AND MULTI-CANCER GENES ELIGIBLE FOR RNA ANALYSIS) 9/2/22     SURGICAL PATHOLOGY REPORT 9/15/22  FINAL DIAGNOSIS         A. Right breast, lumpectomy:  Ductal carcinoma in-situ, low grade, less than 1 mm to posterior and lateral lumpectomy margins; see specimens B through G for final margin status  See synoptic report    B. Right breast, medial margin, re-excision:  Benign breast tissue; margin negative for carcinoma    C. Right breast, inferior margin, re-excision:  Benign breast tissue; margin negative for carcinoma    D. Right breast, lateral margin, re-excision:  Benign breast tissue; margin negative for carcinoma    E. Right breast, superior margin, re-excision:  Benign breast tissue; margin negative for carcinoma    F. Right breast, posterior margin, re-excision:  Benign breast tissue; margin negative for carcinoma    G. Right breast, anterior margin, re-excision:  Ductal carcinoma in-situ extending to the final true margin (positive margin)      SURGICAL PATHOLOGY REPORT 9/29/22  FINAL DIAGNOSIS         Breast, right, anterior margin, re-excision:  Ductal carcinoma in situ (low grade) extending into new re-excision margin (positive margin).       10/20/22 heme/onc consultation   11/3/22 VV to discuss TB recs; pt has already elected to p/w mastectomy       Family History   Problem Relation Age of Onset    Hypertension Mother     Dementia Father     Cancer Mother         pancreatic      Social History     Socioeconomic History    Marital status:      Spouse name: None    Number of children: None    Years of education: None    Highest education level: None   Tobacco Use    Smoking status: Never    Smokeless tobacco: Never   Substance and Sexual Activity    Alcohol use: Not Currently    Drug use: No        Review of Systems   Constitutional:  Negative for appetite change, chills, diaphoresis, fatigue, fever and unexpected weight change. HENT:   Negative for hearing loss, mouth sores, nosebleeds, sore throat, trouble swallowing and voice change. Eyes:  Negative for icterus. Respiratory:  Negative for chest tightness, hemoptysis, shortness of breath and wheezing. Cardiovascular:  Negative for chest pain, leg swelling and palpitations. Gastrointestinal:  Negative for abdominal distention, abdominal pain, blood in stool, constipation, diarrhea, nausea and vomiting. Endocrine: Negative for hot flashes. Genitourinary:  Negative for difficulty urinating, frequency, vaginal bleeding and vaginal discharge. Musculoskeletal:  Negative for arthralgias, flank pain, gait problem and myalgias. Skin:  Negative for itching, rash and wound. Neurological:  Negative for dizziness, extremity weakness, gait problem, headaches and numbness. Psychiatric/Behavioral:  Negative for confusion and depression. The patient is not nervous/anxious. No Known Allergies  Past Medical History:   Diagnosis Date    Arrhythmia     Atrial fibrillation ([address] 3/10/2016    cardioversion 4/2016    CAD (coronary artery disease)     pt states she had a \"mild heart attack\".  no PCI    Chronic systolic CHF (congestive heart failure) ([address] 6/24/2015    Diabetes (HCC)     pre-diabetic    GERD (gastroesophageal reflux disease)     Heart failure ([address]     Hyperlipidemia 3/10/2016    ICD (implantable cardioverter-defibrillator) lead failure 6/24/2015    Insomnia w/ sleep apnea 1/11/2016    Nausea & vomiting     Nocturnal hypoxemia 1/11/2016    SHIRLEY (obstructive sleep apnea) 1/11/2016    cpap    Sleep apnea     Thyroid disease      Past Surgical History:   Procedure Laterality Date    HEENT      partial thyroidectomy    PACEMAKER      ICD    [facility] CARDIAC SURG PROCEDURE UNLIST      [facility] - Cardioversion 5/16      Current Outpatient Medications   Medication Sig Dispense Refill    sotalol (BETAPACE) 120 MG tablet Take 1 tablet by mouth in the morning and 1 tablet in the evening. 180 tablet 3    JARDIANCE 10 MG tablet 10 mg      atorvastatin (LIPITOR) 20 MG tablet Take 1 tablet by mouth daily 90 tablet 3    levothyroxine (SYNTHROID) 88 MCG tablet Take by mouth every morning (before breakfast)      metoprolol succinate (TOPROL XL) 25 MG extended release tablet 50 mg 1 qam then 25 mg qhs      omeprazole (PRILOSEC) 20 MG delayed release capsule Take 20 mg by mouth daily      spironolactone (ALDACTONE) 25 MG tablet Take 25 mg by mouth daily      apixaban (ELIQUIS) 5 MG TABS tablet Take 1 tablet by mouth 2 times daily 180 tablet 2    sacubitril-valsartan (ENTRESTO) 24-26 MG per tablet Take 1 tablet by mouth 2 times daily 180 tablet 2     No current facility-administered medications for this visit. No flowsheet data found. OBJECTIVE:  There were no vitals taken for this visit. ECOG PERFORMANCE STATUS - 1- Restricted in physically strenuous activity but ambulatory and able to carry out work of a light or sedentary nature such as light house work, office work. Pain - /10. None/Minimal pain - not affecting QOL     Fatigue - No flowsheet data found. Distress - No flowsheet data found. Physical Exam  Vitals reviewed. Exam conducted with a chaperone present. Constitutional:       General: She is not in acute distress. Appearance: Normal appearance. She is not ill-appearing or toxic-appearing. HENT:      Head: Normocephalic and atraumatic. Nose: Nose normal.   Eyes:      General: No scleral icterus. Pulmonary:      Effort: Pulmonary effort is normal. No respiratory distress. Skin:     Coloration: Skin is not jaundiced or pale. Neurological:      General: No focal deficit present. Mental Status: She is alert and oriented to person, place, and time. Psychiatric:         Behavior: Behavior normal.         Thought Content: Thought content normal.        Labs:  No results found for this or any previous visit (from the past 168 hour(s)). Imaging: reviewed     PATHOLOGY:        ASSESSMENT:     Diagnosis Orders   1. Ductal carcinoma in situ (DCIS) of breast, unspecified laterality        2. Chronic systolic CHF (congestive heart failure) [facility])              Ms. Indy Coyle is here for FU of breast cancer. DCIS - ER97.4%, solid papillary pattern   - routine screening mammogram 6/30/22 - series of nodules with possible associated spiculation at the 9:00 position in the right breast.    - diagnostic right mammogram 7/2022 - suspicious focal irregular nodular densities with distortion extending 8 to 10 cm from the nipple on the 9-10:00 position of the right breast, together measuring approximately 3 cm in AP diameter.    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 - unable to have MRI due to ICD device   - referred to surgery and evaluated in consultation by Surgeon, Dr. Lionel Sidhu, on 8/31/22.    - genetic testing [facility] 9/2022 - negative   - saw XRT preop and felt that she could p/w lumpectomy followed by XRT   - 9/15/22 lumpectomy - low grade DCIS, margin <1mm - posterior and lateral   9/29/22 re-excision margin - again DCIS extending into new re-excision margin     - here for virtual visit to discuss tumor board review. Ultimately patient would be best served by mastectomy, in the interim, she has met with Dr. Zaire Garcia (cardiology) and discussed her case with [facility] and has come to the same conclusion on her own. Her surgical risk has been modified to moderate by Dr. Zaire Garcia and she had a recent echo with an increase in EF. She has made a decision to proceed with mastectomy and wishes to do that with Dr. Meet Ayers.   She will let us know when the surgery date is after she communicates that to him. - Did recommend that she checks ER/MI on the sample if remains DCIS. IHC will be checked if her final pathology suggests invasive disease rather than that just DCIS. She verbalized understanding and took some notes. Her  was present for the discussion.    - Patients with DCIS undergo local treatment with either mastectomy or BCT followed by adjuvant radiation (unless low-risk disease). The decision to administer endocrine therapy for risk reduction of subsequent cancers depends on choice of local therapy and receptor status. Five years of endocrine therapy is offered to women with ER/MI positive disease who undergo unilateral mastectomy or BCT. - The primary role of systemic therapy is risk reduction of invasive breast cancer in ipsi/contralateral breast.  Chemotherapy plays no role in management of DCIS. About 50-75% of DCIS lesions express ER/MI. Tamoxifen is approved for adjuvant therapy to prevent invasive breast cancer recurrence in women with DCIS, but AI is also an acceptable option. Trials have shown that postoperative endocrine therapy with/without adjuvant RT, is effective in reducing risk of DCIS/invasive disease without survival benefit for women treated with BCT with ER/MI positive diease. Chemoprevention does not prevent ER negative disease.    - Side effects include endometrial cancer, thromboembolic events, fatigue, joint stiffness and arthritis, hot flashes and mood changes to name a few. 2.  Surveillance  -H&P every 3-6 months for first 3 years, then every 6-12 months for the next 2 years and then annually. She was counseled on how to perform monthly breast self-examination.       RESUSCITATION DIRECTIVES/HOSPICE CARE: Full Support    RTC after sx or sooner as needed   Sx questions deferred to sx     MDM  Number of Diagnoses or Management Options  Chronic systolic CHF (congestive heart failure) ([facility]): established, improving  Ductal carcinoma in situ (DCIS) of breast, unspecified laterality: new, needed workup     Amount and/or Complexity of Data Reviewed  Clinical lab tests: reviewed  Tests in the radiology section of CPT®: reviewed  Obtain history from someone other than the patient: yes  Review and summarize past medical records: yes  Discuss the patient with other providers: yes  Independent visualization of images, tracings, or specimens: yes    Risk of Complications, Morbidity, and/or Mortality  Presenting problems: moderate  Diagnostic procedures: moderate  Management options: moderate        Lab studies and imaging studies were personally reviewed. Pertinent old records were reviewed. Historical:   - she is here for discussion / 2nd opinion of her options. - we reviewed that she has options of resection of the margin per sx assessment (but she is aware that this may/may not be final as she could have a positive margin), mastectomy (if unilateral, enod therapy would still be recommended) or XRT/endocrine therapy. Her surgical risk is considered high due to cardiomyopathy/ICD device/repeat anesthesia. DCIS is on the right, therefore, the risk of cardiac damage by radiation is lower than If the disease was on the left - she knows that ultimately I defer these questions to XRT. - plan for tumor board discussion   - refer back to Dr Radha De Leon so aware of new dx and eval prior to interventions, will plan for echo     All questions were asked and answered to the best of my ability. The patient verbalized understanding and agrees with the plan above.               [place] Justin Marrero, [address] [place] [address] Hematology and Oncology  [address] [place] [address]  Office : [phone]  Fax : [phone]

## 2022-11-03 ENCOUNTER — TELEMEDICINE (OUTPATIENT)
Dept: ONCOLOGY | Age: 67
End: 2022-11-03
Payer: MEDICARE

## 2022-11-03 DIAGNOSIS — I50.22 CHRONIC SYSTOLIC CHF (CONGESTIVE HEART FAILURE) (HCC): ICD-10-CM

## 2022-11-03 DIAGNOSIS — D05.10 DUCTAL CARCINOMA IN SITU (DCIS) OF BREAST, UNSPECIFIED LATERALITY: Primary | ICD-10-CM

## 2022-11-03 PROCEDURE — 3017F COLORECTAL CA SCREEN DOC REV: CPT | Performed by: INTERNAL MEDICINE

## 2022-11-03 PROCEDURE — G8400 PT W/DXA NO RESULTS DOC: HCPCS | Performed by: INTERNAL MEDICINE

## 2022-11-03 PROCEDURE — G8427 DOCREV CUR MEDS BY ELIG CLIN: HCPCS | Performed by: INTERNAL MEDICINE

## 2022-11-03 PROCEDURE — 1123F ACP DISCUSS/DSCN MKR DOCD: CPT | Performed by: INTERNAL MEDICINE

## 2022-11-03 PROCEDURE — 99213 OFFICE O/P EST LOW 20 MIN: CPT | Performed by: INTERNAL MEDICINE

## 2022-11-03 PROCEDURE — 1090F PRES/ABSN URINE INCON ASSESS: CPT | Performed by: INTERNAL MEDICINE

## 2022-11-03 ASSESSMENT — PATIENT HEALTH QUESTIONNAIRE - PHQ9
SUM OF ALL RESPONSES TO PHQ QUESTIONS 1-9: 0
SUM OF ALL RESPONSES TO PHQ QUESTIONS 1-9: 0
1. LITTLE INTEREST OR PLEASURE IN DOING THINGS: 0
2. FEELING DOWN, DEPRESSED OR HOPELESS: 0
SUM OF ALL RESPONSES TO PHQ9 QUESTIONS 1 & 2: 0
SUM OF ALL RESPONSES TO PHQ QUESTIONS 1-9: 0
SUM OF ALL RESPONSES TO PHQ QUESTIONS 1-9: 0

## 2022-11-07 RX ORDER — SACUBITRIL AND VALSARTAN 24; 26 MG/1; MG/1
1 TABLET, FILM COATED ORAL 2 TIMES DAILY
Qty: 180 TABLET | Refills: 2 | Status: SHIPPED | OUTPATIENT
Start: 2022-11-07

## 2022-11-07 NOTE — TELEPHONE ENCOUNTER
MEDICATION REFILL REQUEST      Name of Medication:  Eliquis   Dose:  5 mg  Frequency:  twice a day   Quantity:    Days' supply:  80      Pharmacy Name/Location:  [facility] [place]      Name of Medication:  Entresto   Dose:  24-26 mg  Frequency:  twice a day   Quantity:    Days' supply:  90      Pharmacy Name/Location:  [facility] in [place]

## 2022-11-08 PROBLEM — D05.10 DCIS (DUCTAL CARCINOMA IN SITU): Status: ACTIVE | Noted: 2022-11-08

## 2023-01-03 ASSESSMENT — ENCOUNTER SYMPTOMS
CONSTIPATION: 0
NAUSEA: 0
WHEEZING: 0
SORE THROAT: 0
CHEST TIGHTNESS: 0
SHORTNESS OF BREATH: 0
VOICE CHANGE: 0
VOMITING: 0
SCLERAL ICTERUS: 0
DIARRHEA: 0
ABDOMINAL PAIN: 0
HEMOPTYSIS: 0
TROUBLE SWALLOWING: 0
BLOOD IN STOOL: 0
ABDOMINAL DISTENTION: 0

## 2023-01-03 NOTE — PROGRESS NOTES
[facility] Hematology and Oncology: Established patient - follow up     Chief Complaint   Patient presents with    Follow-up     Reason for Referral: Breast cancer  Referring Provider:  Nancy Dye MD  Primary Care Provider: Gordon Moore MD  Family History of Cancer/Hematologic Disorders: Family history is significant for mother with pancreatic cancer. Presenting Symptoms: Abnormal routine screening mammogram     History of Present Illness:  Ms. Ras Jacques is a 69 y.o. female who presents today for follow up regarding breast cancer. The past medical history is significant for atrial fibrillation, CAD, CHF, DM2, HLD, SHIRLEY, severe cardiomyopathy, ICD, chronic anticoagulation with Eliquis, insomnia, partial thyroidectomy, left heart cath, cardioversion, and ADH s/p right breast excisional biopsy on 8/9/2018. She initially presented to [facility] Radiology for routine bilateral screening mammogram on 6/30/22 which identified a series of nodules with possible associated spiculation at the 9:00 position in the right breast.  Further evaluation with diagnostic digital mammogram and right breast ultrasound were completed on 7/28/22 confirming suspicious focal irregular nodular densities with distortion extending 8 to 10 cm from the nipple on the 9 -10:00 position of the right breast, together measuring approximately 3 cm in AP diameter. 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 Patient was unable to have MRI due to her ICD device. She was referred to surgery and evaluated in consultation by Surgeon, Dr. Jamaal King, on 8/31/22. She was assessed with signs and symptoms of right breast DCIS, papillary pattern, stage 0 and recommended for tag localization lumpectomy. Genetic testing with [facility] diagnostic testing was negative on 9/2/22.   Patient was seen by Radiation Oncology to discuss postoperative radiation as she had concerns about radiation with her cardiomyopathy. Per Dr. Larissa Baxter note, Radiation Oncology felt that this could be managed without undue risk. Tag placement was done at [facility] on 9/9/22. On 9/15/22 she underwent right breast lumpectomy with pathology revealing low grade, ductal carcinoma in-situ, less than 1 mm to posterior and lateral lumpectomy margins. The anterior right breast re-excise margin was positive for DCIS. Re-excision lumpectomy of the anterior margin was completed on 9/29/22 with pathology revealing ductal carcinoma in situ (low grade) extending into new re-excision margin (positive margin). Patient followed up with Dr. Papito Davila on 10/5/22 to discuss options which he felt included: proceeding with radiation even with a positive margin although low-grade DCIS only due to her high operative risk based on severe cardiomyopathy; mastectomy; or additional re-excision lumpectomy which would require excision of a fairly significant area of skin producing some cosmetic defect but strong likelihood of a negative margin. Also discussed was seeking medical oncology opinion particularly in view of consideration of simple radiation and consideration of estrogen blockade as an alternative for further surgery. Referral was subsequently placed to [facility], per surgery, for Medical Oncology evaluation and further treatment recommendations. Pt was here with her . Has a dog named Aranza. At consultation, we discussed the pathophysiology of breast cancer, staging, and the importance of receptor status in terms of treatment options. We then reviewed her medical history as well as oncologic history, recent imaging and pathology in detail. We discussed next plans. virtual visit to discuss tumor board review.   Ultimately patient would be best served by mastectomy, in the interim, she has met with Dr. Yanet Zeng (cardiology) and discussed her case with [facility] and has come to the same conclusion on her own. Her surgical risk has been modified to moderate by  [facility] [place] and she had a recent echo with an increase in EF. She has made a decision to proceed with mastectomy and wishes to do that with Dr. Joann Bullock. Did recommend that she checks ER/IA on the sample if remains DCIS. IHC will be checked if her final pathology suggests invasive disease rather than that just DCIS. She verbalized understanding and took some notes. Her  was present for the discussion. Today, patient is here for follow-up after mastectomy. She feels fantastic. Drains are out and she is healed nicely. At this time, we will proceed with AI. She will try letrozole and let us know if this is tolerable. Side effect profile reviewed in detail and patient was given information regarding tamoxifen and aromatase inhibitor side effects for her reference. According to patient, her tumor was ER/IA positive. We will request a an official report from outside facility for our records. Final mastectomy pathology was negative for residual disease. She is very pleased with his results. Per the [facility] DCIS nomogram her risk of recurrent disease at 5 years without endocrine therapy is 9% and 10-year risk is 15%, with reduction on endocrine therapy down to 5% and 7% respectively. She appreciates this information and wishes to think it over but ultimately is thinking of starting endocrine therapy just to see how she tolerates it. Due to her cardiac history, she will monitor her blood pressure closely. According to patient and  she remains in the relatively low numbers at baseline. No current signs and symptoms of infection.   She had great holidays    Chronological events:   MG SCREENING DIGITAL MAMMOGRAM WITH CAD AND WILLIAM 6/30/22     MG-DIAGNOSTIC DIGITAL MAMMOGRAM WITH WILLIAM, RIGHT 7/28/22     SURGICAL PATHOLOGY REPORT 8/22/22        FINAL DIAGNOSIS          Right breast, upper outer quadrant posterior, stereotactic core biopsy:  Ductal carcinoma in-situ with solid papillary pattern, see comment    Addendum      Breast Profile by Image Analysis  Estrogen Receptor:  Intensity: strong  Percent positive: 97.4%  Status of internal controls: Present and stain as expected  Interpretation: POSITIVE      [facility] DIAGNOSTIC TESTING RESULTS (BRCA ½ PANEL, MULTI-CANCER + RNA PANEL, AND MULTI-CANCER GENES ELIGIBLE FOR RNA ANALYSIS) 9/2/22     SURGICAL PATHOLOGY REPORT 9/15/22  FINAL DIAGNOSIS         A. Right breast, lumpectomy:  Ductal carcinoma in-situ, low grade, less than 1 mm to posterior and lateral lumpectomy margins; see specimens B through G for final margin status  See synoptic report    B. Right breast, medial margin, re-excision:  Benign breast tissue; margin negative for carcinoma    C. Right breast, inferior margin, re-excision:  Benign breast tissue; margin negative for carcinoma    D. Right breast, lateral margin, re-excision:  Benign breast tissue; margin negative for carcinoma    E. Right breast, superior margin, re-excision:  Benign breast tissue; margin negative for carcinoma    F. Right breast, posterior margin, re-excision:  Benign breast tissue; margin negative for carcinoma    G. Right breast, anterior margin, re-excision:  Ductal carcinoma in-situ extending to the final true margin (positive margin)      SURGICAL PATHOLOGY REPORT 9/29/22  FINAL DIAGNOSIS         Breast, right, anterior margin, re-excision:  Ductal carcinoma in situ (low grade) extending into new re-excision margin (positive margin).       10/20/22 heme/onc consultation   11/3/22 VV to discuss TB recs; pt has already elected to p/w mastectomy   Sx at [facility] with no residual disease  1/4/22 FU after sx - discussed endocrine therapy     Family History   Problem Relation Age of Onset    Hypertension Mother     Dementia Father     Cancer Mother         pancreatic      Social History     Socioeconomic History Marital status:      Spouse name: None    Number of children: None    Years of education: None    Highest education level: None   Tobacco Use    Smoking status: Never    Smokeless tobacco: Never   Substance and Sexual Activity    Alcohol use: Not Currently    Drug use: No        Review of Systems   Constitutional:  Negative for appetite change, chills, diaphoresis, fatigue, fever and unexpected weight change. HENT:   Negative for hearing loss, mouth sores, nosebleeds, sore throat, trouble swallowing and voice change. Eyes:  Negative for icterus. Respiratory:  Negative for chest tightness, hemoptysis, shortness of breath and wheezing. Cardiovascular:  Negative for chest pain, leg swelling and palpitations. Gastrointestinal:  Negative for abdominal distention, abdominal pain, blood in stool, constipation, diarrhea, nausea and vomiting. Endocrine: Negative for hot flashes. Genitourinary:  Negative for difficulty urinating, frequency, vaginal bleeding and vaginal discharge. Musculoskeletal:  Negative for arthralgias, flank pain, gait problem and myalgias. Skin:  Negative for itching, rash and wound. Neurological:  Negative for dizziness, extremity weakness, gait problem, headaches and numbness. Psychiatric/Behavioral:  Negative for confusion and depression. The patient is not nervous/anxious. No Known Allergies  Past Medical History:   Diagnosis Date    Arrhythmia     Atrial fibrillation ([facility] [address] 3/10/2016    cardioversion 4/2016    CAD (coronary artery disease)     pt states she had a \"mild heart attack\".  no PCI    Chronic systolic CHF (congestive heart failure) ([facility] [address] 6/24/2015    Diabetes (HCC)     pre-diabetic    GERD (gastroesophageal reflux disease)     [facility]     Hyperlipidemia 3/10/2016    ICD (implantable cardioverter-defibrillator) lead failure 6/24/2015    Insomnia w/ sleep apnea 1/11/2016    Nausea & vomiting     Nocturnal hypoxemia 1/11/2016    SHIRLEY (obstructive sleep apnea) 1/11/2016    cpap    Sleep apnea     Thyroid disease      Past Surgical History:   Procedure Laterality Date    HEENT      partial thyroidectomy    DEUCE BREAST LOC ADDITIONAL LESION LEFT Left 9/9/2022    DEUCE BREAST LOC ADDITIONAL LESION LEFT    PACEMAKER      ICD    [place] UNLISTED PROCEDURE CARDIAC SURGERY      [facility] - Cardioversion 5/16      Current Outpatient Medications   Medication Sig Dispense Refill    letrozole (FEMARA) 2.5 MG tablet Take 1 tablet by mouth daily 90 tablet 3    apixaban (ELIQUIS) 5 MG TABS tablet Take 1 tablet by mouth 2 times daily 180 tablet 2    sacubitril-valsartan (ENTRESTO) 24-26 MG per tablet Take 1 tablet by mouth 2 times daily 180 tablet 2    sotalol (BETAPACE) 120 MG tablet Take 1 tablet by mouth in the morning and 1 tablet in the evening. 180 tablet 3    JARDIANCE 10 MG tablet 10 mg      atorvastatin (LIPITOR) 20 MG tablet Take 1 tablet by mouth daily 90 tablet 3    levothyroxine (SYNTHROID) 88 MCG tablet Take by mouth every morning (before breakfast)      metoprolol succinate (TOPROL XL) 25 MG extended release tablet 50 mg 1 qam then 25 mg qhs      omeprazole (PRILOSEC) 20 MG delayed release capsule Take 20 mg by mouth daily      spironolactone (ALDACTONE) 25 MG tablet Take 25 mg by mouth daily       No current facility-administered medications for this visit. No flowsheet data found. OBJECTIVE:  BP 94/62 (Site: Left Upper Arm, Position: Standing, Cuff Size: Medium Adult)   Pulse (!) 47   Temp 97.9 °F (36.6 °C) (Oral)   Resp 16   Ht 5' 7.5\" (1.715 m)   Wt 203 lb (92.1 kg)   SpO2 100%   BMI 31.33 kg/m²       ECOG PERFORMANCE STATUS - 1- Restricted in physically strenuous activity but ambulatory and able to carry out work of a light or sedentary nature such as light house work, office work. Pain - 0 - No pain/10. None/Minimal pain - not affecting QOL     Fatigue - No flowsheet data found. Distress - No flowsheet data found.         Physical Exam  Vitals reviewed. Exam conducted with a chaperone present. Constitutional:       General: She is not in acute distress. Appearance: Normal appearance. She is not ill-appearing or toxic-appearing. HENT:      Head: Normocephalic and atraumatic. Nose: Nose normal.      Mouth/Throat:      Mouth: Mucous membranes are moist.   Eyes:      General: No scleral icterus. Extraocular Movements: Extraocular movements intact. Conjunctiva/sclera: Conjunctivae normal.      Pupils: Pupils are equal, round, and reactive to light. Cardiovascular:      Rate and Rhythm: Normal rate and regular rhythm. Heart sounds: No murmur heard. Pulmonary:      Effort: Pulmonary effort is normal. No respiratory distress. Chest:      Comments: Post sx changes - healed nicely   No mass/lump   No axillary LAD   Abdominal:      General: There is no distension. Palpations: Abdomen is soft. There is no mass. Tenderness: There is no abdominal tenderness. There is no guarding or rebound. Musculoskeletal:         General: Normal range of motion. Cervical back: Normal range of motion. Right lower leg: No edema. Left lower leg: No edema. Lymphadenopathy:      Cervical: No cervical adenopathy. Upper Body:      Right upper body: No supraclavicular or axillary adenopathy. Left upper body: No supraclavicular or axillary adenopathy. Skin:     General: Skin is warm and dry. Coloration: Skin is not jaundiced or pale. Findings: No rash. Neurological:      General: No focal deficit present. Mental Status: She is alert and oriented to person, place, and time. Gait: Gait normal.   Psychiatric:         Behavior: Behavior normal.         Thought Content: Thought content normal.        Labs:  No results found for this or any previous visit (from the past 168 hour(s)). Imaging: reviewed     PATHOLOGY:        ASSESSMENT:     Diagnosis Orders   1.  Ductal carcinoma in situ (DCIS) of breast, unspecified laterality  letrozole (FEMARA) 2.5 MG tablet    CBC with Auto Differential    Comprehensive Metabolic Panel      2. Aromatase inhibitor use  DEXA BONE DENSITY AXIAL SKELETON          Ms. Kaitlyn Dias is here for FU of breast cancer. DCIS - ER97.4%, solid papillary pattern   - routine screening mammogram 6/30/22 - series of nodules with possible associated spiculation at the 9:00 position in the right breast.    - diagnostic right mammogram 7/2022 - suspicious focal irregular nodular densities with distortion extending 8 to 10 cm from the nipple on the 9-10:00 position of the right breast, together measuring approximately 3 cm in AP diameter.    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 - unable to have MRI due to ICD device   - referred to surgery and evaluated in consultation by Surgeon, Dr. Brooks Bahena, on 8/31/22.    - genetic testing [facility] 9/2022 - negative   - saw XRT preop and felt that she could p/w lumpectomy followed by XRT   - 9/15/22 lumpectomy - low grade DCIS, margin <1mm - posterior and lateral   9/29/22 re-excision margin - again DCIS extending into new re-excision margin     -  here for follow-up after mastectomy. She feels fantastic. She is healed nicely. - At this time, we will proceed with AI. She will try letrozole and let us know if this is tolerable. Side effect profile reviewed in detail and patient was given information regarding tamoxifen and aromatase inhibitor side effects for her reference. - AIs can cause hot flashes, HTN, angina, swelling, fatigue, bone thinning leading to osteoporosis/fractures, joint stiffness, N/V, hair thinning, weight changes, thrombosis and worsening depression to name a few. - According to patient, her tumor was ER/MS positive. We will request a an official report from outside facility for our records.   Final mastectomy pathology was negative for residual disease. She is very pleased with his results. - Per the [facility] DCIS nomogram her risk of recurrent disease at 5 years without endocrine therapy is 9% and 10-year risk is 15%, with reduction on endocrine therapy down to 5% and 7% respectively. She appreciates this information and wishes to think it over but ultimately is thinking of starting endocrine therapy just to see how she tolerates it. - Due to her cardiac history, she will monitor her blood pressure closely. According to patient and  she remains in the relatively low numbers at baseline. 2.  Surveillance  -H&P every 3-6 months for first 3 years, then every 6-12 months for the next 2 years and then annually. She was counseled on how to perform monthly breast self-examination.    - DEXA [id number]; vit D     RESUSCITATION DIRECTIVES/HOSPICE CARE: Full Support    RTC ~6-8wks or sooner as needed   [40min - chart review, review of results and discussion of options, next steps, coordination of care and charting ]      MDM  Number of Diagnoses or Management Options  Aromatase inhibitor use: new, needed workup  Ductal carcinoma in situ (DCIS) of breast, unspecified laterality: established, improving     Amount and/or Complexity of Data Reviewed  Clinical lab tests: ordered and reviewed  Tests in the radiology section of CPT®: reviewed  Tests in the medicine section of CPT®: ordered  Obtain history from someone other than the patient: yes  Review and summarize past medical records: yes  Independent visualization of images, tracings, or specimens: yes    Risk of Complications, Morbidity, and/or Mortality  Presenting problems: moderate  Diagnostic procedures: moderate  Management options: high        Lab studies and imaging studies were personally reviewed. Pertinent old records were reviewed. Historical:   - she is here for discussion / 2nd opinion of her options.     - we reviewed that she has options of resection of the margin per sx assessment (but she is aware that this may/may not be final as she could have a positive margin), mastectomy (if unilateral, enod therapy would still be recommended) or XRT/endocrine therapy. Her surgical risk is considered high due to cardiomyopathy/ICD device/repeat anesthesia. DCIS is on the right, therefore, the risk of cardiac damage by radiation is lower than If the disease was on the left - she knows that ultimately I defer these questions to XRT. - plan for tumor board discussion   - refer back to Dr Yovani Campbell so aware of new dx and eval prior to interventions, will plan for echo   - here for virtual visit to discuss tumor board review. Ultimately patient would be best served by mastectomy, in the interim, she has met with Dr. Yovani Campbell (cardiology) and discussed her case with [facility] and has come to the same conclusion on her own. Her surgical risk has been modified to moderate by Dr. Yovani Campbell and she had a recent echo with an increase in EF. She has made a decision to proceed with mastectomy and wishes to do that with Dr. Fuad Weaver. She will let us know when the surgery date is after she communicates that to him. - Did recommend that she checks ER/MD on the sample if remains DCIS. IHC will be checked if her final pathology suggests invasive disease rather than that just DCIS. She verbalized understanding and took some notes. Her  was present for the discussion.    - Patients with DCIS undergo local treatment with either mastectomy or BCT followed by adjuvant radiation (unless low-risk disease). The decision to administer endocrine therapy for risk reduction of subsequent cancers depends on choice of local therapy and receptor status. Five years of endocrine therapy is offered to women with ER/MD positive disease who undergo unilateral mastectomy or BCT.     - The primary role of systemic therapy is risk reduction of invasive breast cancer in ipsi/contralateral breast. Chemotherapy plays no role in management of DCIS. About 50-75% of DCIS lesions express ER/WY. Tamoxifen is approved for adjuvant therapy to prevent invasive breast cancer recurrence in women with DCIS, but AI is also an acceptable option. Trials have shown that postoperative endocrine therapy with/without adjuvant RT, is effective in reducing risk of DCIS/invasive disease without survival benefit for women treated with BCT with ER/WY positive diease. Chemoprevention does not prevent ER negative disease.    - Side effects include endometrial cancer, thromboembolic events, fatigue, joint stiffness and arthritis, hot flashes and mood changes to name a few. All questions were asked and answered to the best of my ability. The patient verbalized understanding and agrees with the plan above.               [facility] Miesha Torres, [address] [place] [address] Hematology and Oncology  [address] [place] [address]  Office : [phone]  Fax : [phone]

## 2023-01-04 ENCOUNTER — OFFICE VISIT (OUTPATIENT)
Dept: ONCOLOGY | Age: 68
End: 2023-01-04
Payer: MEDICARE

## 2023-01-04 VITALS
RESPIRATION RATE: 16 BRPM | TEMPERATURE: 97.9 F | HEART RATE: 47 BPM | HEIGHT: 68 IN | DIASTOLIC BLOOD PRESSURE: 62 MMHG | SYSTOLIC BLOOD PRESSURE: 94 MMHG | BODY MASS INDEX: 30.77 KG/M2 | OXYGEN SATURATION: 100 % | WEIGHT: 203 LBS

## 2023-01-04 DIAGNOSIS — D05.10 DUCTAL CARCINOMA IN SITU (DCIS) OF BREAST, UNSPECIFIED LATERALITY: Primary | ICD-10-CM

## 2023-01-04 DIAGNOSIS — Z79.811 AROMATASE INHIBITOR USE: ICD-10-CM

## 2023-01-04 PROCEDURE — G8400 PT W/DXA NO RESULTS DOC: HCPCS | Performed by: INTERNAL MEDICINE

## 2023-01-04 PROCEDURE — G8427 DOCREV CUR MEDS BY ELIG CLIN: HCPCS | Performed by: INTERNAL MEDICINE

## 2023-01-04 PROCEDURE — 3017F COLORECTAL CA SCREEN DOC REV: CPT | Performed by: INTERNAL MEDICINE

## 2023-01-04 PROCEDURE — G8484 FLU IMMUNIZE NO ADMIN: HCPCS | Performed by: INTERNAL MEDICINE

## 2023-01-04 PROCEDURE — 1123F ACP DISCUSS/DSCN MKR DOCD: CPT | Performed by: INTERNAL MEDICINE

## 2023-01-04 PROCEDURE — 1090F PRES/ABSN URINE INCON ASSESS: CPT | Performed by: INTERNAL MEDICINE

## 2023-01-04 PROCEDURE — 1036F TOBACCO NON-USER: CPT | Performed by: INTERNAL MEDICINE

## 2023-01-04 PROCEDURE — G8417 CALC BMI ABV UP PARAM F/U: HCPCS | Performed by: INTERNAL MEDICINE

## 2023-01-04 PROCEDURE — 99215 OFFICE O/P EST HI 40 MIN: CPT | Performed by: INTERNAL MEDICINE

## 2023-01-04 RX ORDER — LETROZOLE 2.5 MG/1
2.5 TABLET, FILM COATED ORAL DAILY
Qty: 90 TABLET | Refills: 3 | Status: SHIPPED | OUTPATIENT
Start: 2023-01-04

## 2023-01-04 ASSESSMENT — PATIENT HEALTH QUESTIONNAIRE - PHQ9
SUM OF ALL RESPONSES TO PHQ QUESTIONS 1-9: 0
1. LITTLE INTEREST OR PLEASURE IN DOING THINGS: 0
SUM OF ALL RESPONSES TO PHQ QUESTIONS 1-9: 0
SUM OF ALL RESPONSES TO PHQ9 QUESTIONS 1 & 2: 0
2. FEELING DOWN, DEPRESSED OR HOPELESS: 0

## 2023-01-04 NOTE — PATIENT INSTRUCTIONS
Patient Instructions from Today's Visit    Reason for Visit:  Follow up. Diagnosis Information:  [email or web address]      Plan:  Post right mastectomy at [facility]. Discussed endocrine therapy for 5 years. Potential side effects reviewed. Keep an eye on your blood pressure prior to starting pill and for about a week after starting to make sure it doesn't go up. DEXA due again in the spring. Order provided to do in [place]. Continue taking vitamin D supplement, 2,000 international units daily. Follow Up:  6-8 weeks with labs. Recent Lab Results:    N/a    Treatment Summary has been discussed and given to patient: n/a        -------------------------------------------------------------------------------------------------------------------  Please call our office at [phone] if you have any  of the following symptoms:   Fever of 100.5 or greater  Chills  Shortness of breath  Swelling or pain in one leg    After office hours an answering service is available and will contact a provider for emergencies or if you are experiencing any of the above symptoms. Patient did express an interest in My Chart. My Chart log in information explained on the after visit summary printout [facility]. Yessy Cisneros RN           Tamoxifen     Select Language?   (kayla polk)   Trade names: [employer] com uses generic names in all descriptions of drugs. Novladex is the trade name for tamoxifen. In some cases, health care professionals may use the trade name Novladex when referring to the generic drug name tamoxifen. Drug type: Tamoxifen is a hormone therapy. This medication is classified as an \"anti-estrogen. \" (For more detail, see \"How this drug works\" section below).   What this drug is used for:  Tamoxifen may be given as adjuvant therapy (treatment after successful surgery) in women or men with lymph node negative or lymph node positive breast cancer. Cancers with positive estrogen and progesterone receptors are more likely to benefit from tamoxifen. Tamoxifen reduces the risk of getting breast cancer in the opposite breast.   Tamoxifen may be prescribed in metastatic (cancer that has spread) breast cancer in both women and men. Tamoxifen may be prescribed in women with ductal carcinoma in situ (DCIS) who have completed surgery and radiation therapy. Tamoxifen may reduce the risk of invasive breast cancer. Risks and benefits of tamoxifen therapy should be discussed in this setting. Tamoxifen may be prescribed for women at high risk of breast cancer to reduce the incidence of developing breast cancer. Risks and benefits of tamoxifen therapy should be discussed in this setting. Tamoxifen may also be prescribed for treatment of ovarian cancer. Note: If a drug has been approved for one use, physicians may elect to use this same drug for other problems if they believe it may be helpful. How this drug is given:  Tamoxifen is a pill, given by mouth. The pill should be swallowed whole. Tamoxifen should be taken at about the same time each day with a full glass of water. If you miss a dose, do not take a double dose the next day. The amount of tamoxifen that you will receive depends on many factors, including your general health or other health problems, and the type of cancer or condition being treated. Your doctor will determine your dose, schedule and duration of treatment. Side effects:   Important things to remember about the side effects of tamoxifen:  Most people do not experience all of the side effects listed. Side effects are often predictable in terms of their onset and duration. Side effects are almost always reversible and will go away after treatment is complete. There are many options to help minimize or prevent side effects.    There is no relationship between the presence or severity of side effects and the effectiveness of the medication. The following side effects are common (occurring in greater than 30%) for patients taking tamoxifen: Hot flashes (see sexuality)   Vaginal discharge (see sexuality)   Swelling (fluid retention in feet, ankles, or hands)   Loss of libido (particularly in men) (see sexuality)  These side effects are less common side effects (occurring in about 10-29%) of patients receiving tamoxifen:  Nausea   Menstrual irregularities   Vaginal bleeding   Weight loss   Mood changes (see anxiety and/or depression)  A rare, but serious side effect of tamoxifen is blood clots, including deep vein thrombosis (DVT) and pulmonary embolus. You should seek emergency help and notify your health care provider immediately if you develop sudden chest pain and shortness of breath. Notify your health care provider within 24 hours if you notice that one leg is swollen, red, painful and/or warm to touch and the other is not. A rare, but serious side effect of tamoxifen can be the development of uterine cancer. Women who have not had a hysterectomy should have regular pap smears and gyn examinations. Abnormal vaginal bleeding should be reported to your health care provider. Your fertility, meaning your ability to conceive or father a child, may be affected by tamoxifen. Please discuss this issue with your health care provider. Not all side effects are listed above. Some that are rare (occurring in less than 10% of patients) are not listed here. However, you should always inform your health care provider if you experience any unusual symptoms. When to contact your doctor or health care provider:  Seek emergency help immediately and notify your health care provider, it you experience the following symptoms:  Sudden shortness of breath and/or chest pain  The following symptoms require medical attention, but are not an emergency.  Contact your health care provider within 24 hours of noticing any of the following:  Swelling, redness and/or pain in one leg or arm and not the other   New breast lumps   Excessive vaginal discharge or bleeding, menstrual (period) pain or irregularities   Nausea (interferes with ability to eat and unrelieved with prescribed medication)   Depression (interfering with your ability to carry on your regular activities)   Changes in vision  Always inform your health care provider if you experience any unusual symptoms.  Precautions:   Before starting tamoxifen treatment, make sure you tell your doctor about any other medications you are taking (including prescription, over-the-counter, vitamins, herbal remedies, etc.). Do not take aspirin, or products containing aspirin unless your doctor specifically permits this.   Let your health care professional know if you have ever had a blood clot that required medical treatment.   Inform your health care professional if you are pregnant or may be pregnant prior to starting this treatment. Pregnancy category D (tamoxifen may be hazardous to the fetus. Women who are pregnant or become pregnant must be advised of the potential hazard to the fetus).   For both men and women: Do not conceive a child (get pregnant) while taking tamoxifen. Barrier methods of contraception, such as condoms, are recommended. Discuss with your doctor when you may safely become pregnant or conceive a child after therapy.   Do not breast feed while taking this medication.  Self-care tips:  Do not stop taking this medication unless your healthcare provider tells you. You may be on it for as long as 5 years.   If you are experiencing hot flashes, wearing light clothing, staying in a cool environment, and putting cool cloths on your head may reduce symptoms. Consult you health care provider if these worsen, or become intolerable   This medication causes little nausea. But if you should experience nausea, take anti-nausea medications as prescribed by your doctor, and eat  small frequent meals. Sucking on lozenges and chewing gum may also help. Avoid sun exposure. Wear SPF 13 (or higher) sunblock and protective clothing. In general, drinking alcoholic beverages should be kept to a minimum or avoided completely. You should discuss this with your doctor. Get plenty of rest.   Maintain good nutrition. If you experience symptoms or side effects, be sure to discuss them with your health care team. They can prescribe medications and/or offer other suggestions that are effective in managing such problems. Monitoring and testing: You will be checked regularly by your health care professional while you are taking tamoxifen, to monitor side effects and check your response to therapy. Periodic blood work to monitor your complete blood count (CBC) as well as the function of other organs (such as your kidneys and liver) may also be ordered by your doctor. Women will need a gynecologic (GYN) examination before therapy, and during therapy, at regular intervals. Discuss the appropriate schedule with your health care provider. How this drug works:   Hormones are chemical substances that are produced by glands in the body, which enter the bloodstream and cause effects in other tissues. For example, the hormone testosterone, made in the testicles and is responsible for male characteristics such as deepening voice and increased body hair. The use of hormone therapy to treat cancer is based on the observation that receptors for specific hormones that are needed for cell growth are on the surface of some tumor cells. Hormone therapy can work by stopping the production of a certain hormone, blocking hormone receptors, or substituting chemically similar agents for the active hormone, which cannot be used by the tumor cell. The different types of hormone therapies are categorized by their function and/or the type of hormone that is affected. Tamoxifen is an antiestrogen.  Antiestrogens bind to estrogen receptor site on cancer cells thus blocking estrogen from going into the cancer cell. This interferes with cell growth and eventually leads to cell death. The following are antiestrogen medications. tamoxifen, toremifene  Note: We strongly encourage you to talk with your health care professional about your specific medical condition and treatments. The information contained in this website is meant to be helpful and educational, but is not a substitute for medical advice. Patient Education        letrozole  Pronunciation:  LET daysi zol  Brand:  Femara  What is the most important information I should know about letrozole? You should not use letrozole if you are pregnant. What is letrozole? Letrozole lowers estrogen levels in postmenopausal women, which may slow the growth of certain types of breast tumors that need estrogen to grow in the body. Letrozole is used to treat breast cancer in postmenopausal women. It is often given to women who have been taking tamoxifen (Nolvadex, Soltamox) for 5 years. Letrozole may also be used for purposes not listed in this medication guide. What should I discuss with my healthcare provider before taking letrozole? You should not use letrozole if you are allergic to it. This medicine is for use only in women who can no longer get pregnant. Letrozole can harm an unborn baby. Do not use if you are pregnant. Use effective birth control if you are not past menopause. Keep using birth control for at least 3 weeks after your last dose of letrozole. Tell your doctor if you think you may be pregnant. Tell your doctor if you have ever had:  liver disease (especially cirrhosis);  osteoporosis, osteopenia (low bone mineral density);  high cholesterol; or  if you also take tamoxifen. You should not breastfeed while you are using letrozole and for at least 3 weeks after your last dose. How should I take letrozole?   Follow all directions on your prescription label and read all medication guides or instruction sheets. Use the medicine exactly as directed. You may take letrozole with or without food. You will need frequent medical tests, and your bone mineral density may also need to be checked. Store at room temperature away from moisture and heat. What happens if I miss a dose? Take the medicine as soon as you can, but skip the missed dose if it is almost time for your next dose. Do not take two doses at one time. What happens if I overdose? Seek emergency medical attention or call the Poison Help line at 1-912.732.1294. What should I avoid while taking letrozole? Avoid driving or hazardous activity until you know how this medicine will affect you. Your reactions could be impaired. What are the possible side effects of letrozole? Get emergency medical help if you have signs of an allergic reaction: hives; difficult breathing; swelling of your face, lips, tongue, or throat. Common side effects may include:  hot flashes, warmth or redness in your face or chest;  headache, dizziness, weakness;  bone pain, muscle or joint pain;  swelling, weight gain;  increased sweating; or  increased cholesterol in your blood. This is not a complete list of side effects and others may occur. Call your doctor for medical advice about side effects. You may report side effects to FDA at 1-082-FDA-8998. What other drugs will affect letrozole? Other drugs may affect letrozole, including prescription and over-the-counter medicines, vitamins, and herbal products. Tell your doctor about all your current medicines and any medicine you start or stop using. Where can I get more information? Your pharmacist can provide more information about letrozole. Remember, keep this and all other medicines out of the reach of children, never share your medicines with others, and use this medication only for the indication prescribed.    Every effort has been made to ensure that the information provided by [employer]. [employer]') is accurate, up-to-date, and complete, but no guarantee is made to that effect. Drug information contained herein may be time sensitive. [employer] information has been compiled for use by healthcare practitioners and consumers in the [place] and therefore [employer] does not warrant that uses outside of the [place] are appropriate, unless specifically indicated otherwise. [employer] drug information does not endorse drugs, diagnose patients or recommend therapy. [employer] drug information is an informational resource designed to assist licensed healthcare practitioners in caring for their patients and/or to serve consumers viewing this service as a supplement to, and not a substitute for, the expertise, skill, knowledge and judgment of healthcare practitioners. The absence of a warning for a given drug or drug combination in no way should be construed to indicate that the drug or drug combination is safe, effective or appropriate for any given patient. [employer] does not assume any responsibility for any aspect of healthcare administered with the aid of information [employer] provides. The information contained herein is not intended to cover all possible uses, directions, precautions, warnings, drug interactions, allergic reactions, or adverse effects. If you have questions about the drugs you are taking, check with your doctor, nurse or pharmacist.  Copyright 9446-9127 [employer] Version: 8.02. Revision date: 5/14/2020.  Care instructions adapted under license by [employer]. If you have questions about a medical condition or this instruction, always ask your healthcare professional. [employer] disclaims any warranty or liability for your use of this information.

## 2023-03-02 ASSESSMENT — ENCOUNTER SYMPTOMS
ABDOMINAL DISTENTION: 0
CHEST TIGHTNESS: 0
VOMITING: 0
DIARRHEA: 0
SHORTNESS OF BREATH: 0
WHEEZING: 0
ABDOMINAL PAIN: 0
SCLERAL ICTERUS: 0
BLOOD IN STOOL: 0
TROUBLE SWALLOWING: 0
NAUSEA: 0
HEMOPTYSIS: 0
CONSTIPATION: 0
VOICE CHANGE: 0
SORE THROAT: 0

## 2023-03-02 NOTE — PROGRESS NOTES
[facility] Hematology and Oncology: Established patient - follow up     Chief Complaint   Patient presents with    Follow-up     Reason for Referral: Breast cancer  Referring Provider:  Tyree Johnson MD  Primary Care Provider: Bandar Do MD  Family History of Cancer/Hematologic Disorders: Family history is significant for mother with pancreatic cancer. Presenting Symptoms: Abnormal routine screening mammogram     History of Present Illness:  Ms. Gurinder Steiner is a 77 y.o. female who presents today for follow up regarding breast cancer. The past medical history is significant for atrial fibrillation, CAD, CHF, DM2, HLD, SHIRLEY, severe cardiomyopathy, ICD, chronic anticoagulation with Eliquis, insomnia, partial thyroidectomy, left heart cath, cardioversion, and ADH s/p right breast excisional biopsy on 8/9/2018. She initially presented to [facility] Radiology for routine bilateral screening mammogram on 6/30/22 which identified a series of nodules with possible associated spiculation at the 9:00 position in the right breast.  Further evaluation with diagnostic digital mammogram and right breast ultrasound were completed on 7/28/22 confirming suspicious focal irregular nodular densities with distortion extending 8 to 10 cm from the nipple on the 9 -10:00 position of the right breast, together measuring approximately 3 cm in AP diameter. 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 Patient was unable to have MRI due to her ICD device. She was referred to surgery and evaluated in consultation by Surgeon, Dr. Ben Jeff, on 8/31/22. She was assessed with signs and symptoms of right breast DCIS, papillary pattern, stage 0 and recommended for tag localization lumpectomy. Genetic testing with [facility] diagnostic testing was negative on 9/2/22.   Patient was seen by Radiation Oncology to discuss postoperative radiation as she had concerns about radiation with her cardiomyopathy. Per Dr. Angelique Gama note, Radiation Oncology felt that this could be managed without undue risk. Tag placement was done at [facility] on 9/9/22. On 9/15/22 she underwent right breast lumpectomy with pathology revealing low grade, ductal carcinoma in-situ, less than 1 mm to posterior and lateral lumpectomy margins. The anterior right breast re-excise margin was positive for DCIS. Re-excision lumpectomy of the anterior margin was completed on 9/29/22 with pathology revealing ductal carcinoma in situ (low grade) extending into new re-excision margin (positive margin). Patient followed up with Dr. Belén Grigsby on 10/5/22 to discuss options which he felt included: proceeding with radiation even with a positive margin although low-grade DCIS only due to her high operative risk based on severe cardiomyopathy; mastectomy; or additional re-excision lumpectomy which would require excision of a fairly significant area of skin producing some cosmetic defect but strong likelihood of a negative margin. Also discussed was seeking medical oncology opinion particularly in view of consideration of simple radiation and consideration of estrogen blockade as an alternative for further surgery. Referral was subsequently placed to [facility], per surgery, for Medical Oncology evaluation and further treatment recommendations. Pt was here with her . Has a dog named Aranza. At consultation, we discussed the pathophysiology of breast cancer, staging, and the importance of receptor status in terms of treatment options. We then reviewed her medical history as well as oncologic history, recent imaging and pathology in detail. We discussed next plans. virtual visit to discuss tumor board review.   Ultimately patient would be best served by mastectomy, in the interim, she has met with Dr. Lizabeth Delgado (cardiology) and discussed her case with [facility] and has come to the same conclusion on her own.  Her surgical risk has been modified to moderate by Dr. Dacosta and she had a recent echo with an increase in EF.  She has made a decision to proceed with mastectomy and wishes to do that with Dr. Vaz.  Did recommend that she checks ER/[place] on the sample if remains DCIS.  IHC will be checked if her final pathology suggests invasive disease rather than that just DCIS.  She verbalized understanding and took some notes.  Her  was present for the discussion.      According to patient, her tumor was ER/KY positive.  We will request a an official report from outside facility for our records.  Final mastectomy pathology was negative for residual disease.  Per the [facility] DCIS nomogram her risk of recurrent disease at 5 years without endocrine therapy is 9% and 10-year risk is 15%, with reduction on endocrine therapy down to 5% and 7% respectively.  She appreciated this information and wished to think it over but ultimately is thinking of starting endocrine therapy just to see how she tolerates it.  Due to her cardiac history, she will monitor her blood pressure closely.      Today, patient is here for follow-up.  She has been on letrozole since last seen and tolerating it great.  She wishes to continue for now.  She continues to follow-up with cardiology.  No new issues or concerns.  Labs reviewed and mild rise in creatinine noted.  She will increase fluid intake.  Most of her fluids are caffeinated Clyde tea.  No side effects to report regarding the AI use.  She is here with her  who is on oxygen and pending evaluation for possible transplant after COVID.  She has no current signs and symptoms of infection.  We will follow-up with me in 3 months.  Plans to complete mammogram on the left side in the summer.  She will make sure that is scheduled - She does it locally.      Chronological events:   MG SCREENING DIGITAL MAMMOGRAM WITH CAD AND WILLIAM 6/30/22    MG-DIAGNOSTIC DIGITAL MAMMOGRAM WITH WILLIAM, RIGHT 7/28/22     SURGICAL PATHOLOGY REPORT 8/22/22        FINAL DIAGNOSIS          Right breast, upper outer quadrant posterior, stereotactic core biopsy:  Ductal carcinoma in-situ with solid papillary pattern, see comment    Addendum      Breast Profile by Image Analysis  Estrogen Receptor:  Intensity: strong  Percent positive: 97.4%  Status of internal controls: Present and stain as expected  Interpretation: POSITIVE      [facility] DIAGNOSTIC TESTING RESULTS (BRCA ½ PANEL, MULTI-CANCER + RNA PANEL, AND MULTI-CANCER GENES ELIGIBLE FOR RNA ANALYSIS) 9/2/22     SURGICAL PATHOLOGY REPORT 9/15/22  FINAL DIAGNOSIS         A. Right breast, lumpectomy:  Ductal carcinoma in-situ, low grade, less than 1 mm to posterior and lateral lumpectomy margins; see specimens B through G for final margin status  See synoptic report    B. Right breast, medial margin, re-excision:  Benign breast tissue; margin negative for carcinoma    C. Right breast, inferior margin, re-excision:  Benign breast tissue; margin negative for carcinoma    D. Right breast, lateral margin, re-excision:  Benign breast tissue; margin negative for carcinoma    E. Right breast, superior margin, re-excision:  Benign breast tissue; margin negative for carcinoma    F. Right breast, posterior margin, re-excision:  Benign breast tissue; margin negative for carcinoma    G. Right breast, anterior margin, re-excision:  Ductal carcinoma in-situ extending to the final true margin (positive margin)      SURGICAL PATHOLOGY REPORT 9/29/22  FINAL DIAGNOSIS         Breast, right, anterior margin, re-excision:  Ductal carcinoma in situ (low grade) extending into new re-excision margin (positive margin).       10/20/22 heme/onc consultation   11/3/22 VV to discuss TB recs; pt has already elected to p/w mastectomy   Sx at [facility] with no residual disease  1/4/22 FU after sx - discussed endocrine therapy - started letrozole   3/6/23 FU on AI, doing great; mammogram in the summer - pt will sched locally     Family History   Problem Relation Age of Onset    Hypertension Mother     Dementia Father     Cancer Mother         pancreatic      Social History     Socioeconomic History    Marital status:      Spouse name: None    Number of children: None    Years of education: None    Highest education level: None   Tobacco Use    Smoking status: Never    Smokeless tobacco: Never   Substance and Sexual Activity    Alcohol use: Not Currently    Drug use: No        Review of Systems   Constitutional:  Negative for appetite change, chills, diaphoresis, fatigue, fever and unexpected weight change. HENT:   Negative for hearing loss, mouth sores, nosebleeds, sore throat, trouble swallowing and voice change. Eyes:  Negative for icterus. Respiratory:  Negative for chest tightness, hemoptysis, shortness of breath and wheezing. Cardiovascular:  Negative for chest pain, leg swelling and palpitations. Gastrointestinal:  Negative for abdominal distention, abdominal pain, blood in stool, constipation, diarrhea, nausea and vomiting. Endocrine: Negative for hot flashes. Genitourinary:  Negative for difficulty urinating, frequency, vaginal bleeding and vaginal discharge. Musculoskeletal:  Negative for arthralgias, flank pain, gait problem and myalgias. Skin:  Negative for itching, rash and wound. Neurological:  Negative for dizziness, extremity weakness, gait problem, headaches and numbness. Psychiatric/Behavioral:  Negative for confusion and depression. The patient is not nervous/anxious. No Known Allergies  Past Medical History:   Diagnosis Date    Arrhythmia     Atrial fibrillation ([facility] [address] 3/10/2016    cardioversion 4/2016    CAD (coronary artery disease)     pt states she had a \"mild heart attack\".  no PCI    Chronic systolic CHF (congestive heart failure) ([address] 6/24/2015    Diabetes ([facility] [address]     pre-diabetic    GERD (gastroesophageal reflux disease)     Heart failure (HCC)     Hyperlipidemia 3/10/2016    ICD (implantable cardioverter-defibrillator) lead failure 6/24/2015    Insomnia w/ sleep apnea 1/11/2016    Nausea & vomiting     Nocturnal hypoxemia 1/11/2016    SHIRLEY (obstructive sleep apnea) 1/11/2016    cpap    Sleep apnea     Thyroid disease      Past Surgical History:   Procedure Laterality Date    HEENT      partial thyroidectomy    DEUCE BREAST LOC ADDITIONAL LESION LEFT Left 9/9/2022    DEUCE BREAST LOC ADDITIONAL LESION LEFT    PACEMAKER      ICD    MT UNLISTED PROCEDURE CARDIAC SURGERY      LHC - Cardioversion 5/16      Current Outpatient Medications   Medication Sig Dispense Refill    letrozole (FEMARA) 2.5 MG tablet Take 1 tablet by mouth daily 90 tablet 3    apixaban (ELIQUIS) 5 MG TABS tablet Take 1 tablet by mouth 2 times daily 180 tablet 2    sacubitril-valsartan (ENTRESTO) 24-26 MG per tablet Take 1 tablet by mouth 2 times daily 180 tablet 2    sotalol (BETAPACE) 120 MG tablet Take 1 tablet by mouth in the morning and 1 tablet in the evening. 180 tablet 3    JARDIANCE 10 MG tablet 10 mg      atorvastatin (LIPITOR) 20 MG tablet Take 1 tablet by mouth daily 90 tablet 3    levothyroxine (SYNTHROID) 88 MCG tablet Take by mouth every morning (before breakfast)      metoprolol succinate (TOPROL XL) 25 MG extended release tablet 50 mg 1 qam then 25 mg qhs      omeprazole (PRILOSEC) 20 MG delayed release capsule Take 20 mg by mouth daily      spironolactone (ALDACTONE) 25 MG tablet Take 25 mg by mouth daily       No current facility-administered medications for this visit. No flowsheet data found.     OBJECTIVE:  BP (!) 97/53   Pulse 60   Temp 97.3 °F (36.3 °C)   Resp 16   Ht 5' 7.5\" (1.715 m)   Wt 201 lb 12.8 oz (91.5 kg)   SpO2 95%   BMI 31.14 kg/m²       ECOG PERFORMANCE STATUS - 1- Restricted in physically strenuous activity but ambulatory and able to carry out work of a light or sedentary nature such as light house work, office work. Pain - 0 - No pain/10. None/Minimal pain - not affecting QOL     Fatigue - No flowsheet data found. Distress - No flowsheet data found. Physical Exam  Vitals reviewed. Exam conducted with a chaperone present. Constitutional:       General: She is not in acute distress. Appearance: Normal appearance. She is not ill-appearing or toxic-appearing. HENT:      Head: Normocephalic and atraumatic. Nose: Nose normal.      Mouth/Throat:      Mouth: Mucous membranes are moist.   Eyes:      General: No scleral icterus. Extraocular Movements: Extraocular movements intact. Conjunctiva/sclera: Conjunctivae normal.      Pupils: Pupils are equal, round, and reactive to light. Cardiovascular:      Rate and Rhythm: Normal rate and regular rhythm. Heart sounds: No murmur heard. Pulmonary:      Effort: Pulmonary effort is normal. No respiratory distress. Chest:      Comments: Post sx changes - healed nicely   No mass/lump   No axillary LAD   Abdominal:      General: There is no distension. Palpations: Abdomen is soft. There is no mass. Tenderness: There is no abdominal tenderness. There is no guarding or rebound. Musculoskeletal:         General: Normal range of motion. Cervical back: Normal range of motion. Right lower leg: No edema. Left lower leg: No edema. Lymphadenopathy:      Cervical: No cervical adenopathy. Upper Body:      Right upper body: No supraclavicular or axillary adenopathy. Left upper body: No supraclavicular or axillary adenopathy. Skin:     General: Skin is warm and dry. Coloration: Skin is not jaundiced or pale. Findings: No rash. Neurological:      General: No focal deficit present. Mental Status: She is alert and oriented to person, place, and time. Gait: Gait normal.   Psychiatric:         Behavior: Behavior normal.         Thought Content:  Thought content normal.        Labs:  No results found for this or any previous visit (from the past 168 hour(s)). Imaging: reviewed     PATHOLOGY:        ASSESSMENT:     Diagnosis Orders   1. Ductal carcinoma in situ (DCIS) of breast, unspecified laterality        2. Aromatase inhibitor use            Ms. Indy Coyle is here for FU of breast cancer. DCIS - ER97.4%, solid papillary pattern   - routine screening mammogram 6/30/22 - series of nodules with possible associated spiculation at the 9:00 position in the right breast.    - diagnostic right mammogram 7/2022 - suspicious focal irregular nodular densities with distortion extending 8 to 10 cm from the nipple on the 9-10:00 position of the right breast, together measuring approximately 3 cm in AP diameter.    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 - unable to have MRI due to ICD device   - referred to surgery and evaluated in consultation by Surgeon, Dr. Lionel Sidhu, on 8/31/22.    - genetic testing [facility] 9/2022 - negative   - saw XRT preop and felt that she could p/w lumpectomy followed by XRT   - 9/15/22 lumpectomy - low grade DCIS, margin <1mm - posterior and lateral   9/29/22 re-excision margin - again DCIS extending into new re-excision margin   S/p mastectomy   - start letrozole 1/2023     - here for follow-up. She has been on letrozole since last seen and tolerating it great. She wishes to continue for now. She continues to follow-up with cardiology. No new issues or concerns. Labs reviewed. No side effects to report regarding the AI use. - mild rise in creatinine noted. She will increase fluid intake. Most of her fluids are caffeinated Clyde tea. - Plans to complete mammogram on the left side in the summer. She will make sure that is scheduled - She does it locally.       2.  Surveillance  -H&P every 3-6 months for first 3 years, then every 6-12 months for the next 2 years and then annually. She was counseled on how to perform monthly breast self-examination.    - DEXA [id number]; vit D     RESUSCITATION DIRECTIVES/HOSPICE CARE: Full Support    RTC ~3-4mo or sooner as needed     MDM      Lab studies and imaging studies were personally reviewed. Pertinent old records were reviewed. Historical:   - she is here for discussion / 2nd opinion of her options. - we reviewed that she has options of resection of the margin per sx assessment (but she is aware that this may/may not be final as she could have a positive margin), mastectomy (if unilateral, enod therapy would still be recommended) or XRT/endocrine therapy. Her surgical risk is considered high due to cardiomyopathy/ICD device/repeat anesthesia. DCIS is on the right, therefore, the risk of cardiac damage by radiation is lower than If the disease was on the left - she knows that ultimately I defer these questions to XRT. - plan for tumor board discussion   - refer back to Dr Esteban Fuentes so aware of new dx and eval prior to interventions, will plan for echo   - here for virtual visit to discuss tumor board review. Ultimately patient would be best served by mastectomy, in the interim, she has met with Dr. Esteban Fuentes (cardiology) and discussed her case with [facility] and has come to the same conclusion on her own. Her surgical risk has been modified to moderate by Dr. Esteban Fuentes and she had a recent echo with an increase in EF. She has made a decision to proceed with mastectomy and wishes to do that with Dr. Rivera Cheng. She will let us know when the surgery date is after she communicates that to him. - Did recommend that she checks ER/MI on the sample if remains DCIS. IHC will be checked if her final pathology suggests invasive disease rather than that just DCIS. She verbalized understanding and took some notes.   Her  was present for the discussion.    - Patients with DCIS undergo local treatment with either mastectomy or BCT followed by adjuvant radiation (unless low-risk disease).  The decision to administer endocrine therapy for risk reduction of subsequent cancers depends on choice of local therapy and receptor status.  Five years of endocrine therapy is offered to women with ER/[place] positive disease who undergo unilateral mastectomy or BCT.    - The primary role of systemic therapy is risk reduction of invasive breast cancer in ipsi/contralateral breast.  Chemotherapy plays no role in management of DCIS.  About 50-75% of DCIS lesions express ER/[place].  Tamoxifen is approved for adjuvant therapy to prevent invasive breast cancer recurrence in women with DCIS, but AI is also an acceptable option.  Trials have shown that postoperative endocrine therapy with/without adjuvant RT, is effective in reducing risk of DCIS/invasive disease without survival benefit for women treated with BCT with ER/[place] positive diease.  Chemoprevention does not prevent ER negative disease.    - Side effects include endometrial cancer, thromboembolic events, fatigue, joint stiffness and arthritis, hot flashes and mood changes to name a few.    -  here for follow-up after mastectomy.  She feels fantastic.  She is healed nicely.  - At this time, we will proceed with AI.  She will try letrozole and let us know if this is tolerable.  Side effect profile reviewed in detail and patient was given information regarding tamoxifen and aromatase inhibitor side effects for her reference.    - AIs can cause hot flashes, HTN, angina, swelling, fatigue, bone thinning leading to osteoporosis/fractures, joint stiffness, N/V, hair thinning, weight changes, thrombosis and worsening depression to name a few.  - According to patient, her tumor was ER/[place] positive.  We will request a an official report from outside facility for our records.  Final mastectomy pathology was negative for residual disease.  She is very pleased with his results.  - Per the [facility] DCIS nomogram her risk of recurrent  disease at 5 years without endocrine therapy is 9% and 10-year risk is 15%, with reduction on endocrine therapy down to 5% and 7% respectively. She appreciates this information and wishes to think it over but ultimately is thinking of starting endocrine therapy just to see how she tolerates it. - Due to her cardiac history, she will monitor her blood pressure closely. According to patient and  she remains in the relatively low numbers at baseline. All questions were asked and answered to the best of my ability. The patient verbalized understanding and agrees with the plan above.               [facility], [address] [place] [address] Hematology and Oncology  [address] [place] [address]  Office : [phone]  Fax : [phone]

## 2023-03-06 ENCOUNTER — HOSPITAL ENCOUNTER (OUTPATIENT)
Dept: LAB | Age: 68
Discharge: HOME | End: 2023-03-09
Payer: MEDICARE

## 2023-03-06 ENCOUNTER — OFFICE VISIT (OUTPATIENT)
Dept: ONCOLOGY | Age: 68
End: 2023-03-06
Payer: MEDICARE

## 2023-03-06 VITALS
WEIGHT: 201.8 LBS | RESPIRATION RATE: 16 BRPM | BODY MASS INDEX: 30.58 KG/M2 | DIASTOLIC BLOOD PRESSURE: 53 MMHG | SYSTOLIC BLOOD PRESSURE: 97 MMHG | HEART RATE: 60 BPM | OXYGEN SATURATION: 95 % | TEMPERATURE: 97.3 F | HEIGHT: 68 IN

## 2023-03-06 DIAGNOSIS — D05.10 DUCTAL CARCINOMA IN SITU (DCIS) OF BREAST, UNSPECIFIED LATERALITY: Primary | ICD-10-CM

## 2023-03-06 DIAGNOSIS — Z79.811 AROMATASE INHIBITOR USE: ICD-10-CM

## 2023-03-06 DIAGNOSIS — D05.10 DUCTAL CARCINOMA IN SITU (DCIS) OF BREAST, UNSPECIFIED LATERALITY: ICD-10-CM

## 2023-03-06 LAB
ALBUMIN SERPL-MCNC: 3.8 G/DL (ref 3.2–4.6)
ALBUMIN/GLOB SERPL: 1 (ref 0.4–1.6)
ALP SERPL-CCNC: 139 U/L (ref 50–136)
ALT SERPL-CCNC: 20 U/L (ref 12–65)
ANION GAP SERPL CALC-SCNC: 2 MMOL/L (ref 2–11)
AST SERPL-CCNC: 23 U/L (ref 15–37)
BASOPHILS # BLD: 0 K/UL (ref 0–0.2)
BASOPHILS NFR BLD: 1 % (ref 0–2)
BILIRUB SERPL-MCNC: 0.7 MG/DL (ref 0.2–1.1)
BUN SERPL-MCNC: 22 MG/DL (ref 8–23)
CALCIUM SERPL-MCNC: 9.2 MG/DL (ref 8.3–10.4)
CHLORIDE SERPL-SCNC: 107 MMOL/L (ref 101–110)
CO2 SERPL-SCNC: 29 MMOL/L (ref 21–32)
CREAT SERPL-MCNC: 1.2 MG/DL (ref 0.6–1)
DIFFERENTIAL METHOD BLD: NORMAL
EOSINOPHIL # BLD: 0.1 K/UL (ref 0–0.8)
EOSINOPHIL NFR BLD: 2 % (ref 0.5–7.8)
ERYTHROCYTE [DISTWIDTH] IN BLOOD BY AUTOMATED COUNT: 12.7 % (ref 11.9–14.6)
GLOBULIN SER CALC-MCNC: 3.8 G/DL (ref 2.8–4.5)
GLUCOSE SERPL-MCNC: 115 MG/DL (ref 65–100)
HCT VFR BLD AUTO: 44.8 % (ref 35.8–46.3)
HGB BLD-MCNC: 14.5 G/DL (ref 11.7–15.4)
IMM GRANULOCYTES # BLD AUTO: 0 K/UL (ref 0–0.5)
IMM GRANULOCYTES NFR BLD AUTO: 0 % (ref 0–5)
LYMPHOCYTES # BLD: 1.8 K/UL (ref 0.5–4.6)
LYMPHOCYTES NFR BLD: 29 % (ref 13–44)
MCH RBC QN AUTO: 30.3 PG (ref 26.1–32.9)
MCHC RBC AUTO-ENTMCNC: 32.4 G/DL (ref 31.4–35)
MCV RBC AUTO: 93.7 FL (ref 82–102)
MONOCYTES # BLD: 0.4 K/UL (ref 0.1–1.3)
MONOCYTES NFR BLD: 7 % (ref 4–12)
NEUTS SEG # BLD: 3.9 K/UL (ref 1.7–8.2)
NEUTS SEG NFR BLD: 61 % (ref 43–78)
NRBC # BLD: 0 K/UL (ref 0–0.2)
PLATELET # BLD AUTO: 217 K/UL (ref 150–450)
PMV BLD AUTO: 9.6 FL (ref 9.4–12.3)
POTASSIUM SERPL-SCNC: 4.1 MMOL/L (ref 3.5–5.1)
PROT SERPL-MCNC: 7.6 G/DL (ref 6.3–8.2)
RBC # BLD AUTO: 4.78 M/UL (ref 4.05–5.2)
SODIUM SERPL-SCNC: 138 MMOL/L (ref 133–143)
WBC # BLD AUTO: 6.3 K/UL (ref 4.3–11.1)

## 2023-03-06 PROCEDURE — 1123F ACP DISCUSS/DSCN MKR DOCD: CPT | Performed by: INTERNAL MEDICINE

## 2023-03-06 PROCEDURE — G8484 FLU IMMUNIZE NO ADMIN: HCPCS | Performed by: INTERNAL MEDICINE

## 2023-03-06 PROCEDURE — 3017F COLORECTAL CA SCREEN DOC REV: CPT | Performed by: INTERNAL MEDICINE

## 2023-03-06 PROCEDURE — G8400 PT W/DXA NO RESULTS DOC: HCPCS | Performed by: INTERNAL MEDICINE

## 2023-03-06 PROCEDURE — G8427 DOCREV CUR MEDS BY ELIG CLIN: HCPCS | Performed by: INTERNAL MEDICINE

## 2023-03-06 PROCEDURE — 85025 COMPLETE CBC W/AUTO DIFF WBC: CPT

## 2023-03-06 PROCEDURE — 80053 COMPREHEN METABOLIC PANEL: CPT

## 2023-03-06 PROCEDURE — 99214 OFFICE O/P EST MOD 30 MIN: CPT | Performed by: INTERNAL MEDICINE

## 2023-03-06 PROCEDURE — 1036F TOBACCO NON-USER: CPT | Performed by: INTERNAL MEDICINE

## 2023-03-06 PROCEDURE — 1090F PRES/ABSN URINE INCON ASSESS: CPT | Performed by: INTERNAL MEDICINE

## 2023-03-06 PROCEDURE — 36415 COLL VENOUS BLD VENIPUNCTURE: CPT

## 2023-03-06 PROCEDURE — G8417 CALC BMI ABV UP PARAM F/U: HCPCS | Performed by: INTERNAL MEDICINE

## 2023-03-06 NOTE — PATIENT INSTRUCTIONS
Patient Instructions from Today's Visit    Reason for Visit:  Follow up    Diagnosis Information:  [email or web address] net/[email or web address]-us/[email or web address]-answers-patient-education-materials/gwnb-[email or web address]-[email or web address]-sheets      Plan:  Reviewed tolerance of letrozole. If anything changes, please let us know. Drink more water! Mammogram of left breast due in summer. Please schedule DEXA bone density scan at your convenience. The radiology number is [phone].     Follow Up:  3 months    Recent Lab Results:  Hospital Outpatient Visit on 03/06/2023   Component Date Value Ref Range Status    WBC 03/06/2023 6.3  4.3 - 11.1 K/uL Final    RBC 03/06/2023 4.78  4.05 - 5.2 M/uL Final    Hemoglobin 03/06/2023 14.5  11.7 - 15.4 g/dL Final    Hematocrit 03/06/2023 44.8  35.8 - 46.3 % Final    MCV 03/06/2023 93.7  82.0 - 102.0 FL Final    MCH 03/06/2023 30.3  26.1 - 32.9 PG Final    MCHC 03/06/2023 32.4  31.4 - 35.0 g/dL Final    RDW 03/06/2023 12.7  11.9 - 14.6 % Final    Platelets 51/61/0795 217  150 - 450 K/uL Final    MPV 03/06/2023 9.6  9.4 - 12.3 FL Final    nRBC 03/06/2023 0.00  0.0 - 0.2 K/uL Final    **Note: Absolute NRBC parameter is now reported with Hemogram**    Seg Neutrophils 03/06/2023 61  43 - 78 % Final    Lymphocytes 03/06/2023 29  13 - 44 % Final    Monocytes 03/06/2023 7  4.0 - 12.0 % Final    Eosinophils % 03/06/2023 2  0.5 - 7.8 % Final    Basophils 03/06/2023 1  0.0 - 2.0 % Final    Immature Granulocytes 03/06/2023 0  0.0 - 5.0 % Final    Segs Absolute 03/06/2023 3.9  1.7 - 8.2 K/UL Final    Absolute Lymph # 03/06/2023 1.8  0.5 - 4.6 K/UL Final    Absolute Mono # 03/06/2023 0.4  0.1 - 1.3 K/UL Final    Absolute Eos # 03/06/2023 0.1  0.0 - 0.8 K/UL Final    Basophils Absolute 03/06/2023 0.0  0.0 - 0.2 K/UL Final    Absolute Immature Granulocyte 03/06/2023 0.0  0.0 - 0.5 K/UL Final    Differential Type 03/06/2023 AUTOMATED    Final    Sodium 03/06/2023 PENDING  mmol/L Incomplete    Potassium 03/06/2023 PENDING  mmol/L Incomplete    Chloride 03/06/2023 PENDING  mmol/L Incomplete    CO2 03/06/2023 PENDING  mmol/L Incomplete    Anion Gap 03/06/2023 PENDING  mmol/L Incomplete    Glucose 03/06/2023 115 (A)  65 - 100 mg/dL Final    BUN 03/06/2023 22  8 - 23 MG/DL Final    Creatinine 03/06/2023 1.20 (A)  0.6 - 1.0 MG/DL Final    Est, Glom Filt Rate 03/06/2023 49 (A)  >60 ml/min/1.73m2 Final    Comment:      Pediatric calculator link: [employer].at. org/professionals/kdoqi/gfr_calculatorped       These results are not intended for use in patients <25years of age. eGFR results are calculated without a race factor using  the 2021 CKD-EPI equation. Careful clinical correlation is recommended, particularly when comparing to results calculated using previous equations. The CKD-EPI equation is less accurate in patients with extremes of muscle mass, extra-renal metabolism of creatinine, excessive creatine ingestion, or following therapy that affects renal tubular secretion.       Calcium 03/06/2023 9.2  8.3 - 10.4 MG/DL Final    Total Bilirubin 03/06/2023 0.7  0.2 - 1.1 MG/DL Final    ALT 03/06/2023 20  12 - 65 U/L Final    AST 03/06/2023 23  15 - 37 U/L Final    Alk Phosphatase 03/06/2023 139 (A)  50 - 136 U/L Final    Total Protein 03/06/2023 7.6  6.3 - 8.2 g/dL Final    Albumin 03/06/2023 3.8  3.2 - 4.6 g/dL Final    Globulin 03/06/2023 3.8  2.8 - 4.5 g/dL Final    Albumin/Globulin Ratio 03/06/2023 1.0  0.4 - 1.6   Final         Treatment Summary has been discussed and given to patient: n/a          -------------------------------------------------------------------------------------------------------------------  Please call our office at [phone] if you have any  of the following symptoms:   Fever of 100.5 or greater  Chills  Shortness of breath  Swelling or pain in one leg    After office hours an answering service is available and will contact a provider for emergencies or if you are experiencing any of the above symptoms. Patient did express an interest in My Chart. My Chart log in information explained on the after visit summary printout at the [facility] [address] desk.     Lyla Sheikh RN

## 2023-03-14 PROBLEM — Z79.811 AROMATASE INHIBITOR USE: Status: ACTIVE | Noted: 2023-03-14

## 2023-04-19 RX ORDER — METOPROLOL SUCCINATE 25 MG/1
TABLET, EXTENDED RELEASE ORAL
Qty: 270 TABLET | Refills: 3 | Status: SHIPPED | OUTPATIENT
Start: 2023-04-19

## 2023-04-19 NOTE — TELEPHONE ENCOUNTER
Requested Prescriptions     Pending Prescriptions Disp Refills    metoprolol succinate (TOPROL XL) 25 MG extended release tablet [Pharmacy Med Name: METOPROLOL SUCC ER 25 MG TAB] 270 tablet 3     Sig: TAKE 2 TABLETS IN THE MORNING AND 1 TABLET IN THE EVENING.

## 2023-04-20 ENCOUNTER — TELEPHONE (OUTPATIENT)
Dept: CARDIOLOGY CLINIC | Age: 68
End: 2023-04-20

## 2023-04-20 NOTE — TELEPHONE ENCOUNTER
Pt called in saying she needs to cancel her appt with [facility] because she will goes to the [address] [place] [address] and will get it checked there.

## 2023-05-25 DIAGNOSIS — Z79.811 AROMATASE INHIBITOR USE: ICD-10-CM

## 2023-06-01 DIAGNOSIS — Z79.811 AROMATASE INHIBITOR USE: Primary | ICD-10-CM

## 2023-06-07 ENCOUNTER — HOSPITAL ENCOUNTER (OUTPATIENT)
Dept: LAB | Age: 68
Discharge: HOME | End: 2023-06-10
Payer: MEDICARE

## 2023-06-07 DIAGNOSIS — Z79.811 AROMATASE INHIBITOR USE: ICD-10-CM

## 2023-06-07 LAB
ALBUMIN SERPL-MCNC: 3.9 G/DL (ref 3.2–4.6)
ALBUMIN/GLOB SERPL: 1.1 (ref 0.4–1.6)
ALP SERPL-CCNC: 122 U/L (ref 50–136)
ALT SERPL-CCNC: 19 U/L (ref 12–65)
ANION GAP SERPL CALC-SCNC: 3 MMOL/L (ref 2–11)
AST SERPL-CCNC: 21 U/L (ref 15–37)
BASOPHILS # BLD: 0 K/UL (ref 0–0.2)
BASOPHILS NFR BLD: 0 % (ref 0–2)
BILIRUB SERPL-MCNC: 0.9 MG/DL (ref 0.2–1.1)
BUN SERPL-MCNC: 17 MG/DL (ref 8–23)
CALCIUM SERPL-MCNC: 9.4 MG/DL (ref 8.3–10.4)
CHLORIDE SERPL-SCNC: 106 MMOL/L (ref 101–110)
CO2 SERPL-SCNC: 28 MMOL/L (ref 21–32)
CREAT SERPL-MCNC: 1.3 MG/DL (ref 0.6–1)
DIFFERENTIAL METHOD BLD: NORMAL
EOSINOPHIL # BLD: 0.2 K/UL (ref 0–0.8)
EOSINOPHIL NFR BLD: 3 % (ref 0.5–7.8)
ERYTHROCYTE [DISTWIDTH] IN BLOOD BY AUTOMATED COUNT: 13.1 % (ref 11.9–14.6)
GLOBULIN SER CALC-MCNC: 3.7 G/DL (ref 2.8–4.5)
GLUCOSE SERPL-MCNC: 100 MG/DL (ref 65–100)
HCT VFR BLD AUTO: 45 % (ref 35.8–46.3)
HGB BLD-MCNC: 14.4 G/DL (ref 11.7–15.4)
IMM GRANULOCYTES # BLD AUTO: 0 K/UL (ref 0–0.5)
IMM GRANULOCYTES NFR BLD AUTO: 0 % (ref 0–5)
LYMPHOCYTES # BLD: 2.2 K/UL (ref 0.5–4.6)
LYMPHOCYTES NFR BLD: 30 % (ref 13–44)
MCH RBC QN AUTO: 30 PG (ref 26.1–32.9)
MCHC RBC AUTO-ENTMCNC: 32 G/DL (ref 31.4–35)
MCV RBC AUTO: 93.8 FL (ref 82–102)
MONOCYTES # BLD: 0.6 K/UL (ref 0.1–1.3)
MONOCYTES NFR BLD: 8 % (ref 4–12)
NEUTS SEG # BLD: 4.4 K/UL (ref 1.7–8.2)
NEUTS SEG NFR BLD: 59 % (ref 43–78)
NRBC # BLD: 0 K/UL (ref 0–0.2)
PLATELET # BLD AUTO: 206 K/UL (ref 150–450)
PMV BLD AUTO: 9.7 FL (ref 9.4–12.3)
POTASSIUM SERPL-SCNC: 4.4 MMOL/L (ref 3.5–5.1)
PROT SERPL-MCNC: 7.6 G/DL (ref 6.3–8.2)
RBC # BLD AUTO: 4.8 M/UL (ref 4.05–5.2)
SODIUM SERPL-SCNC: 137 MMOL/L (ref 133–143)
WBC # BLD AUTO: 7.4 K/UL (ref 4.3–11.1)

## 2023-06-07 PROCEDURE — 80053 COMPREHEN METABOLIC PANEL: CPT

## 2023-06-07 PROCEDURE — 36415 COLL VENOUS BLD VENIPUNCTURE: CPT

## 2023-06-07 PROCEDURE — 85025 COMPLETE CBC W/AUTO DIFF WBC: CPT

## 2023-07-07 RX ORDER — SPIRONOLACTONE 25 MG/1
25 TABLET ORAL DAILY
Qty: 90 TABLET | Refills: 0 | Status: SHIPPED | OUTPATIENT
Start: 2023-07-07

## 2023-07-07 RX ORDER — ATORVASTATIN CALCIUM 20 MG/1
20 TABLET, FILM COATED ORAL DAILY
Qty: 90 TABLET | Refills: 0 | Status: SHIPPED | OUTPATIENT
Start: 2023-07-07

## 2023-07-07 RX ORDER — SOTALOL HYDROCHLORIDE 120 MG/1
120 TABLET ORAL EVERY 12 HOURS
Qty: 180 TABLET | Refills: 0 | Status: SHIPPED | OUTPATIENT
Start: 2023-07-07

## 2023-07-07 RX ORDER — SACUBITRIL AND VALSARTAN 24; 26 MG/1; MG/1
1 TABLET, FILM COATED ORAL 2 TIMES DAILY
Qty: 60 TABLET | Refills: 3 | Status: SHIPPED | OUTPATIENT
Start: 2023-07-07

## 2023-07-07 NOTE — TELEPHONE ENCOUNTER
Eliquis 5mg 1 BID # 60  Atorvastatin 20mg 1 daily #90  Spironalactone 25mg 1 daily #90  Sotolol 120mg 1 BID #180  Entresto 24-26 1 BID # 60    [facility] pharmacy [facility]

## 2023-07-07 NOTE — TELEPHONE ENCOUNTER
Requested Prescriptions     Pending Prescriptions Disp Refills    apixaban (ELIQUIS) 5 MG TABS tablet 60 tablet 3     Sig: Take 1 tablet by mouth 2 times daily    spironolactone (ALDACTONE) 25 MG tablet 90 tablet 0     Sig: Take 1 tablet by mouth daily    atorvastatin (LIPITOR) 20 MG tablet 90 tablet 0     Sig: Take 1 tablet by mouth daily    sotalol (BETAPACE) 120 MG tablet 180 tablet 0     Sig: Take 1 tablet by mouth in the morning and 1 tablet in the evening. sacubitril-valsartan (ENTRESTO) 24-26 MG per tablet 60 tablet 3     Sig: Take 1 tablet by mouth 2 times daily       Patient has follow up appointment on 9/6/23.

## 2023-08-31 RX ORDER — ATORVASTATIN CALCIUM 20 MG/1
20 TABLET, FILM COATED ORAL DAILY
Qty: 30 TABLET | Refills: 0 | Status: SHIPPED | OUTPATIENT
Start: 2023-08-31

## 2023-08-31 NOTE — TELEPHONE ENCOUNTER
Requested Prescriptions     Pending Prescriptions Disp Refills    atorvastatin (LIPITOR) 20 MG tablet 30 tablet 0     Sig: Take 1 tablet by mouth daily     Patient has an appt on 9/6/23.

## 2023-09-06 ENCOUNTER — OFFICE VISIT (OUTPATIENT)
Age: 68
End: 2023-09-06

## 2023-09-06 VITALS
SYSTOLIC BLOOD PRESSURE: 92 MMHG | HEIGHT: 68 IN | DIASTOLIC BLOOD PRESSURE: 60 MMHG | WEIGHT: 201 LBS | BODY MASS INDEX: 30.46 KG/M2 | HEART RATE: 60 BPM

## 2023-09-06 DIAGNOSIS — I50.22 CHRONIC SYSTOLIC CHF (CONGESTIVE HEART FAILURE) (HCC): ICD-10-CM

## 2023-09-06 DIAGNOSIS — E78.00 PURE HYPERCHOLESTEROLEMIA: ICD-10-CM

## 2023-09-06 DIAGNOSIS — I48.19 PERSISTENT ATRIAL FIBRILLATION (HCC): ICD-10-CM

## 2023-09-06 DIAGNOSIS — I48.0 PAF (PAROXYSMAL ATRIAL FIBRILLATION) (HCC): Primary | ICD-10-CM

## 2023-09-06 RX ORDER — SPIRONOLACTONE 25 MG/1
25 TABLET ORAL DAILY
Qty: 30 TABLET | Refills: 11 | Status: SHIPPED | OUTPATIENT
Start: 2023-09-06

## 2023-09-06 RX ORDER — SOTALOL HYDROCHLORIDE 120 MG/1
120 TABLET ORAL EVERY 12 HOURS
Qty: 60 TABLET | Refills: 11 | Status: SHIPPED | OUTPATIENT
Start: 2023-09-06

## 2023-09-06 RX ORDER — SACUBITRIL AND VALSARTAN 24; 26 MG/1; MG/1
1 TABLET, FILM COATED ORAL 2 TIMES DAILY
Qty: 60 TABLET | Refills: 11 | Status: SHIPPED | OUTPATIENT
Start: 2023-09-06

## 2023-09-06 RX ORDER — ATORVASTATIN CALCIUM 20 MG/1
20 TABLET, FILM COATED ORAL DAILY
Qty: 30 TABLET | Refills: 11 | Status: SHIPPED | OUTPATIENT
Start: 2023-09-06

## 2023-10-03 ASSESSMENT — ENCOUNTER SYMPTOMS
ABDOMINAL PAIN: 0
CONSTIPATION: 0
SCLERAL ICTERUS: 0
SORE THROAT: 0
BLOOD IN STOOL: 0
WHEEZING: 0
SHORTNESS OF BREATH: 0
ABDOMINAL DISTENTION: 0
TROUBLE SWALLOWING: 0
HEMOPTYSIS: 0
DIARRHEA: 0
NAUSEA: 0
VOICE CHANGE: 0
VOMITING: 0
CHEST TIGHTNESS: 0

## 2023-10-03 NOTE — PROGRESS NOTES
for our records. Final mastectomy pathology was negative for residual disease. She is very pleased with his results. - Per the [facility] DCIS nomogram her risk of recurrent disease at 5 years without endocrine therapy is 9% and 10-year risk is 15%, with reduction on endocrine therapy down to 5% and 7% respectively. She appreciates this information and wishes to think it over but ultimately is thinking of starting endocrine therapy just to see how she tolerates it. - Due to her cardiac history, she will monitor her blood pressure closely. According to patient and  she remains in the relatively low numbers at baseline.    - here for follow-up. She had a recent bone density on May 23 and thought that she had osteoporosis of the hip and stopped letrozole. We discussed her DEXA results in detail which show osteoporosis of the left radius where she has history of a fracture. Rest of her lumbar as well as left and right hip show no osteoporosis. She will continue vitamin D and resume letrozole at this time. - Her mammogram is scheduled on 7/7 at outside facility.    - Her creatinine is up to 1.3. Some of this is related to dehydration as well as some of the medication she takes for her heart. She will increase her fluid intake at this time. Rest of the labs are okay. All questions were asked and answered to the best of my ability. The patient verbalized understanding and agrees with the plan above.           Mani Heredia, [address] [facility] Hematology and Oncology  [address]  Office : [phone]  Fax : [phone]

## 2023-10-12 ENCOUNTER — OFFICE VISIT (OUTPATIENT)
Dept: ONCOLOGY | Age: 68
End: 2023-10-12
Payer: MEDICARE

## 2023-10-12 VITALS
TEMPERATURE: 98 F | BODY MASS INDEX: 36.93 KG/M2 | WEIGHT: 195.6 LBS | SYSTOLIC BLOOD PRESSURE: 84 MMHG | OXYGEN SATURATION: 98 % | HEIGHT: 61 IN | HEART RATE: 69 BPM | RESPIRATION RATE: 16 BRPM | DIASTOLIC BLOOD PRESSURE: 59 MMHG

## 2023-10-12 DIAGNOSIS — D05.11 DUCTAL CARCINOMA IN SITU (DCIS) OF RIGHT BREAST: Primary | ICD-10-CM

## 2023-10-12 DIAGNOSIS — Z79.811 AROMATASE INHIBITOR USE: ICD-10-CM

## 2023-10-12 PROCEDURE — G8417 CALC BMI ABV UP PARAM F/U: HCPCS | Performed by: INTERNAL MEDICINE

## 2023-10-12 PROCEDURE — 1036F TOBACCO NON-USER: CPT | Performed by: INTERNAL MEDICINE

## 2023-10-12 PROCEDURE — 99214 OFFICE O/P EST MOD 30 MIN: CPT | Performed by: INTERNAL MEDICINE

## 2023-10-12 PROCEDURE — G8427 DOCREV CUR MEDS BY ELIG CLIN: HCPCS | Performed by: INTERNAL MEDICINE

## 2023-10-12 PROCEDURE — G8399 PT W/DXA RESULTS DOCUMENT: HCPCS | Performed by: INTERNAL MEDICINE

## 2023-10-12 PROCEDURE — 1090F PRES/ABSN URINE INCON ASSESS: CPT | Performed by: INTERNAL MEDICINE

## 2023-10-12 PROCEDURE — 3017F COLORECTAL CA SCREEN DOC REV: CPT | Performed by: INTERNAL MEDICINE

## 2023-10-12 PROCEDURE — 1123F ACP DISCUSS/DSCN MKR DOCD: CPT | Performed by: INTERNAL MEDICINE

## 2023-10-12 PROCEDURE — G8484 FLU IMMUNIZE NO ADMIN: HCPCS | Performed by: INTERNAL MEDICINE

## 2023-10-12 ASSESSMENT — PATIENT HEALTH QUESTIONNAIRE - PHQ9: DEPRESSION UNABLE TO ASSESS: PT REFUSES

## 2023-10-12 NOTE — PATIENT INSTRUCTIONS
symptoms. Patient did express an interest in My Chart. My Chart log in information explained on the after visit summary printout at the [address] desk.     Mahalia Gaucher, RN

## 2024-01-02 DIAGNOSIS — D05.10 DUCTAL CARCINOMA IN SITU (DCIS) OF BREAST, UNSPECIFIED LATERALITY: ICD-10-CM

## 2024-01-03 RX ORDER — LETROZOLE 2.5 MG/1
2.5 TABLET, FILM COATED ORAL DAILY
Qty: 90 TABLET | Refills: 0 | OUTPATIENT
Start: 2024-01-03

## 2024-01-04 ENCOUNTER — TELEPHONE (OUTPATIENT)
Dept: ONCOLOGY | Age: 69
End: 2024-01-04

## 2024-01-04 DIAGNOSIS — D05.10 DUCTAL CARCINOMA IN SITU (DCIS) OF BREAST, UNSPECIFIED LATERALITY: ICD-10-CM

## 2024-01-04 RX ORDER — LETROZOLE 2.5 MG/1
2.5 TABLET, FILM COATED ORAL DAILY
Qty: 90 TABLET | Refills: 3 | Status: SHIPPED | OUTPATIENT
Start: 2024-01-04

## 2024-01-04 NOTE — TELEPHONE ENCOUNTER
Medication Requested: Letrozole    Date last prescribed: 1/4/23    Requested Pharmacy: [facility]    Action Taken: Chart reviewed, refill sent, LVM

## 2024-01-04 NOTE — TELEPHONE ENCOUNTER
Physician provider: Jacy Dorado MD  Reason for today's call: med refill   Last office visit:10/12/2023    Patient notified that their information will be routed to the [facility] clinical triage team for review. Patient is advised that they will receive a phone call from the triage department. I        Medication: Letrozole 2.5 Mg    Pharmacy:  [facility] [place]

## 2024-03-08 NOTE — PROGRESS NOTES
[facility] Hematology and Oncology: Established patient - follow up     Chief Complaint   Patient presents with    Follow-up     Reason for Referral: Breast cancer  Referring Provider:  Brigida Vincent MD  Primary Care Provider: Any Alvarez MD  Family History of Cancer/Hematologic Disorders: Family history is significant for mother with pancreatic cancer.   Presenting Symptoms: Abnormal routine screening mammogram     History of Present Illness:  Ms. Aguila is a 69 y.o. female who presents today for follow up regarding breast cancer.  The past medical history is significant for atrial fibrillation, CAD, CHF, DM2, HLD, SHIRLEY, severe cardiomyopathy, ICD, chronic anticoagulation with Eliquis, insomnia, partial thyroidectomy, left heart cath, cardioversion, and ADH s/p right breast excisional biopsy on 8/9/2018.  She initially presented to [facility] for routine bilateral screening mammogram on 6/30/22 which identified a series of nodules with possible associated spiculation at the 9:00 position in the right breast.  Further evaluation with diagnostic digital mammogram and right breast ultrasound were completed on 7/28/22 confirming suspicious focal irregular nodular densities with distortion extending 8 to 10 cm from the nipple on the 9 -10:00 position of the right breast, together measuring approximately 3 cm in AP diameter.  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  Patient was unable to have MRI due to her ICD device.  She was referred to surgery and evaluated in consultation by Surgeon, Dr. John Vaz, on 8/31/22.  She was assessed with signs and symptoms of right breast DCIS, papillary pattern, stage 0 and recommended for tag localization lumpectomy. Genetic testing with [facility] diagnostic testing was negative on 9/2/22.  Patient was seen by Radiation Oncology to discuss

## 2024-03-08 NOTE — PROGRESS NOTES
All orders placed during this encounter were verbal orders received from Dr. Dorado, read back and verified.

## 2024-03-15 ENCOUNTER — HOSPITAL ENCOUNTER (OUTPATIENT)
Dept: LAB | Age: 69
End: 2024-03-15
Payer: MEDICARE

## 2024-03-15 ENCOUNTER — TELEPHONE (OUTPATIENT)
Dept: ONCOLOGY | Age: 69
End: 2024-03-15

## 2024-03-15 ENCOUNTER — OFFICE VISIT (OUTPATIENT)
Dept: ONCOLOGY | Age: 69
End: 2024-03-15
Payer: MEDICARE

## 2024-03-15 VITALS
HEIGHT: 61 IN | BODY MASS INDEX: 37 KG/M2 | OXYGEN SATURATION: 100 % | DIASTOLIC BLOOD PRESSURE: 57 MMHG | TEMPERATURE: 98.1 F | RESPIRATION RATE: 16 BRPM | SYSTOLIC BLOOD PRESSURE: 106 MMHG | WEIGHT: 196 LBS | HEART RATE: 61 BPM

## 2024-03-15 DIAGNOSIS — D05.10 DUCTAL CARCINOMA IN SITU (DCIS) OF BREAST, UNSPECIFIED LATERALITY: Primary | ICD-10-CM

## 2024-03-15 DIAGNOSIS — Z12.11 ENCOUNTER FOR SCREENING COLONOSCOPY: ICD-10-CM

## 2024-03-15 DIAGNOSIS — R79.89 ELEVATED LFTS: ICD-10-CM

## 2024-03-15 DIAGNOSIS — Z12.31 ENCOUNTER FOR SCREENING MAMMOGRAM FOR MALIGNANT NEOPLASM OF BREAST: ICD-10-CM

## 2024-03-15 DIAGNOSIS — R74.8 ELEVATED ALKALINE PHOSPHATASE LEVEL: ICD-10-CM

## 2024-03-15 DIAGNOSIS — Z79.811 AROMATASE INHIBITOR USE: ICD-10-CM

## 2024-03-15 LAB
ALBUMIN SERPL-MCNC: 3.9 G/DL (ref 3.2–4.6)
ALBUMIN/GLOB SERPL: 1.1 (ref 0.4–1.6)
ALP SERPL-CCNC: 149 U/L (ref 50–136)
ALT SERPL-CCNC: 13 U/L (ref 12–65)
ANION GAP SERPL CALC-SCNC: 3 MMOL/L (ref 2–11)
AST SERPL-CCNC: 21 U/L (ref 15–37)
BASOPHILS # BLD: 0 K/UL (ref 0–0.2)
BASOPHILS NFR BLD: 1 % (ref 0–2)
BILIRUB SERPL-MCNC: 1.2 MG/DL (ref 0.2–1.1)
BUN SERPL-MCNC: 20 MG/DL (ref 8–23)
CALCIUM SERPL-MCNC: 9.3 MG/DL (ref 8.3–10.4)
CHLORIDE SERPL-SCNC: 105 MMOL/L (ref 103–113)
CO2 SERPL-SCNC: 28 MMOL/L (ref 21–32)
CREAT SERPL-MCNC: 1.3 MG/DL (ref 0.6–1)
DIFFERENTIAL METHOD BLD: NORMAL
EOSINOPHIL # BLD: 0.1 K/UL (ref 0–0.8)
EOSINOPHIL NFR BLD: 1 % (ref 0.5–7.8)
ERYTHROCYTE [DISTWIDTH] IN BLOOD BY AUTOMATED COUNT: 13.2 % (ref 11.9–14.6)
GGT SERPL-CCNC: 21 U/L (ref 5–55)
GLOBULIN SER CALC-MCNC: 3.7 G/DL (ref 2.8–4.5)
GLUCOSE SERPL-MCNC: 95 MG/DL (ref 65–100)
HCT VFR BLD AUTO: 44.6 % (ref 35.8–46.3)
HGB BLD-MCNC: 14.4 G/DL (ref 11.7–15.4)
IMM GRANULOCYTES # BLD AUTO: 0 K/UL (ref 0–0.5)
IMM GRANULOCYTES NFR BLD AUTO: 0 % (ref 0–5)
LYMPHOCYTES # BLD: 1.4 K/UL (ref 0.5–4.6)
LYMPHOCYTES NFR BLD: 25 % (ref 13–44)
MCH RBC QN AUTO: 30 PG (ref 26.1–32.9)
MCHC RBC AUTO-ENTMCNC: 32.3 G/DL (ref 31.4–35)
MCV RBC AUTO: 92.9 FL (ref 82–102)
MONOCYTES # BLD: 0.4 K/UL (ref 0.1–1.3)
MONOCYTES NFR BLD: 8 % (ref 4–12)
NEUTS SEG # BLD: 3.5 K/UL (ref 1.7–8.2)
NEUTS SEG NFR BLD: 65 % (ref 43–78)
NRBC # BLD: 0 K/UL (ref 0–0.2)
PLATELET # BLD AUTO: 172 K/UL (ref 150–450)
PMV BLD AUTO: 10 FL (ref 9.4–12.3)
POTASSIUM SERPL-SCNC: 4.2 MMOL/L (ref 3.5–5.1)
PROT SERPL-MCNC: 7.6 G/DL (ref 6.3–8.2)
RBC # BLD AUTO: 4.8 M/UL (ref 4.05–5.2)
SODIUM SERPL-SCNC: 136 MMOL/L (ref 136–146)
WBC # BLD AUTO: 5.4 K/UL (ref 4.3–11.1)

## 2024-03-15 PROCEDURE — 1123F ACP DISCUSS/DSCN MKR DOCD: CPT | Performed by: INTERNAL MEDICINE

## 2024-03-15 PROCEDURE — 3017F COLORECTAL CA SCREEN DOC REV: CPT | Performed by: INTERNAL MEDICINE

## 2024-03-15 PROCEDURE — G8427 DOCREV CUR MEDS BY ELIG CLIN: HCPCS | Performed by: INTERNAL MEDICINE

## 2024-03-15 PROCEDURE — 80053 COMPREHEN METABOLIC PANEL: CPT

## 2024-03-15 PROCEDURE — 85025 COMPLETE CBC W/AUTO DIFF WBC: CPT

## 2024-03-15 PROCEDURE — G8484 FLU IMMUNIZE NO ADMIN: HCPCS | Performed by: INTERNAL MEDICINE

## 2024-03-15 PROCEDURE — 99214 OFFICE O/P EST MOD 30 MIN: CPT | Performed by: INTERNAL MEDICINE

## 2024-03-15 PROCEDURE — 1036F TOBACCO NON-USER: CPT | Performed by: INTERNAL MEDICINE

## 2024-03-15 PROCEDURE — 36415 COLL VENOUS BLD VENIPUNCTURE: CPT

## 2024-03-15 PROCEDURE — 1090F PRES/ABSN URINE INCON ASSESS: CPT | Performed by: INTERNAL MEDICINE

## 2024-03-15 PROCEDURE — 82977 ASSAY OF GGT: CPT

## 2024-03-15 PROCEDURE — G8399 PT W/DXA RESULTS DOCUMENT: HCPCS | Performed by: INTERNAL MEDICINE

## 2024-03-15 PROCEDURE — G8417 CALC BMI ABV UP PARAM F/U: HCPCS | Performed by: INTERNAL MEDICINE

## 2024-03-15 ASSESSMENT — PATIENT HEALTH QUESTIONNAIRE - PHQ9
2. FEELING DOWN, DEPRESSED OR HOPELESS: NOT AT ALL
SUM OF ALL RESPONSES TO PHQ QUESTIONS 1-9: 0
SUM OF ALL RESPONSES TO PHQ QUESTIONS 1-9: 0
1. LITTLE INTEREST OR PLEASURE IN DOING THINGS: NOT AT ALL
SUM OF ALL RESPONSES TO PHQ QUESTIONS 1-9: 0
SUM OF ALL RESPONSES TO PHQ9 QUESTIONS 1 & 2: 0
SUM OF ALL RESPONSES TO PHQ QUESTIONS 1-9: 0

## 2024-03-15 NOTE — PATIENT INSTRUCTIONS
Patient Information from Today's Visit    Labs reviewed.  Symptoms reviewed.  Mammogram due in July 2024.  You can call to schedule this at [phone].  Referral to [facility] with Dr. Kyle.  Ultrasound of abdomen ordered.  You can call to schedule this at [phone].  If you don't need this, we will let you know.    Treatment Summary has been discussed and given to patient:N/A    Follow Up: About 4 months.    Please refer to After Visit Summary or [employer] for upcoming appointment information. If you have any questions regarding your upcoming schedule please reach out to your care team through [employer]Flow or call [phone].      -------------------------------------------------------------------------------------------------------------------  Please call our office at [phone] if you have any  of the following symptoms:   Fever of 100.5 or greater  Chills  Shortness of breath  Swelling or pain in one leg    After office hours an answering service is available and will contact a provider for emergencies or if you are experiencing any of the above symptoms.    Patient did express an interest in My Chart.  My Chart log in information explained on the after visit summary printout at the check-out desk.    MAYRA WILSON RN      Your Oncology Care Team:  Jacy Dorado MD - Hematologist/Oncologist  Jessica Olson, APRN-CNP - Nurse Practitioner  LJ Piedra RN - Registered Nurse  Genesis Olson - Medical Assistant  Marge Mcneil -

## 2024-03-15 NOTE — TELEPHONE ENCOUNTER
Call to pt x 2.  No answer - LVM for pt to call us back at her convenience to discuss labs and recommendations.  GGT was checked for elevated alk phos - GGT is WNL after review by Dr. Dorado.  Recommend NM bone scan instead of US abdomen at this time.  US abdomen order d/c and bone scan ordered.

## 2024-03-18 ENCOUNTER — OFFICE VISIT (OUTPATIENT)
Age: 69
End: 2024-03-18
Payer: MEDICARE

## 2024-03-18 ENCOUNTER — TELEPHONE (OUTPATIENT)
Dept: ONCOLOGY | Age: 69
End: 2024-03-18

## 2024-03-18 VITALS
SYSTOLIC BLOOD PRESSURE: 98 MMHG | BODY MASS INDEX: 28.88 KG/M2 | HEART RATE: 60 BPM | DIASTOLIC BLOOD PRESSURE: 60 MMHG | HEIGHT: 69 IN | WEIGHT: 195 LBS

## 2024-03-18 DIAGNOSIS — E78.00 PURE HYPERCHOLESTEROLEMIA: ICD-10-CM

## 2024-03-18 DIAGNOSIS — I48.0 PAF (PAROXYSMAL ATRIAL FIBRILLATION) (HCC): ICD-10-CM

## 2024-03-18 DIAGNOSIS — I50.22 CHRONIC SYSTOLIC CHF (CONGESTIVE HEART FAILURE) (HCC): Primary | ICD-10-CM

## 2024-03-18 PROCEDURE — 1123F ACP DISCUSS/DSCN MKR DOCD: CPT | Performed by: INTERNAL MEDICINE

## 2024-03-18 PROCEDURE — 1036F TOBACCO NON-USER: CPT | Performed by: INTERNAL MEDICINE

## 2024-03-18 PROCEDURE — 3017F COLORECTAL CA SCREEN DOC REV: CPT | Performed by: INTERNAL MEDICINE

## 2024-03-18 PROCEDURE — 1090F PRES/ABSN URINE INCON ASSESS: CPT | Performed by: INTERNAL MEDICINE

## 2024-03-18 PROCEDURE — G8428 CUR MEDS NOT DOCUMENT: HCPCS | Performed by: INTERNAL MEDICINE

## 2024-03-18 PROCEDURE — G8399 PT W/DXA RESULTS DOCUMENT: HCPCS | Performed by: INTERNAL MEDICINE

## 2024-03-18 PROCEDURE — G8484 FLU IMMUNIZE NO ADMIN: HCPCS | Performed by: INTERNAL MEDICINE

## 2024-03-18 PROCEDURE — 99214 OFFICE O/P EST MOD 30 MIN: CPT | Performed by: INTERNAL MEDICINE

## 2024-03-18 PROCEDURE — G8417 CALC BMI ABV UP PARAM F/U: HCPCS | Performed by: INTERNAL MEDICINE

## 2024-03-18 RX ORDER — SPIRONOLACTONE 25 MG/1
25 TABLET ORAL DAILY
Qty: 30 TABLET | Refills: 11 | Status: SHIPPED | OUTPATIENT
Start: 2024-03-18

## 2024-03-18 RX ORDER — METOPROLOL SUCCINATE 25 MG/1
25 TABLET, EXTENDED RELEASE ORAL 2 TIMES DAILY
Qty: 270 TABLET | Refills: 3 | Status: SHIPPED | OUTPATIENT
Start: 2024-03-18

## 2024-03-18 RX ORDER — SOTALOL HYDROCHLORIDE 120 MG/1
120 TABLET ORAL EVERY 12 HOURS
Qty: 60 TABLET | Refills: 11 | Status: SHIPPED | OUTPATIENT
Start: 2024-03-18

## 2024-03-18 RX ORDER — ATORVASTATIN CALCIUM 20 MG/1
20 TABLET, FILM COATED ORAL DAILY
Qty: 30 TABLET | Refills: 11 | Status: SHIPPED | OUTPATIENT
Start: 2024-03-18

## 2024-03-18 RX ORDER — SACUBITRIL AND VALSARTAN 24; 26 MG/1; MG/1
1 TABLET, FILM COATED ORAL 2 TIMES DAILY
Qty: 60 TABLET | Refills: 11 | Status: SHIPPED | OUTPATIENT
Start: 2024-03-18

## 2024-03-18 NOTE — PROGRESS NOTES
[facility] CARDIOLOGY  [address] [place] [address], [facility] [address]  [place] [address]  PHONE: [phone]      24    NAME:  Sylvia Aguila  : 1955  MRN: [id number]       SUBJECTIVE:   Sylvia Aguila is a 69 y.o. female seen for a follow up visit regarding the following:     Chief Complaint   Patient presents with    Atrial Fibrillation         HPI:    No cp or kirby. No orthopnea or pnd. No palpitations or syncope.      Past Medical History, Past Surgical History, Family history, Social History, and Medications were all reviewed with the patient today and updated as necessary.     Current Outpatient Medications   Medication Sig Dispense Refill    letrozole (FEMARA) 2.5 MG tablet Take 1 tablet by mouth daily 90 tablet 3    JARDIANCE 10 MG tablet 1 tablet      levothyroxine (SYNTHROID) 88 MCG tablet Take by mouth every morning (before breakfast)      omeprazole (PRILOSEC) 20 MG delayed release capsule Take 1 capsule by mouth daily      sacubitril-valsartan (ENTRESTO) 24-26 MG per tablet Take 1 tablet by mouth 2 times daily 60 tablet 11    sotalol (BETAPACE) 120 MG tablet Take 1 tablet by mouth in the morning and 1 tablet in the evening. 60 tablet 11    spironolactone (ALDACTONE) 25 MG tablet Take 1 tablet by mouth daily 30 tablet 11    metoprolol succinate (TOPROL XL) 25 MG extended release tablet Take 1 tablet by mouth 2 times daily 270 tablet 3    atorvastatin (LIPITOR) 20 MG tablet Take 1 tablet by mouth daily 30 tablet 11    apixaban (ELIQUIS) 5 MG TABS tablet Take 1 tablet by mouth 2 times daily 60 tablet 11     No current facility-administered medications for this visit.               Social History     Tobacco Use    Smoking status: Never     Passive exposure: Never    Smokeless tobacco: Never   Substance Use Topics    Alcohol use: Not Currently              PHYSICAL EXAM:   BP 98/60   Pulse 60   Ht 1.753 m (5' 9\")   Wt 88.5 kg (195 lb)   BMI 28.80 kg/m²    Constitutional: Oriented to

## 2024-03-18 NOTE — TELEPHONE ENCOUNTER
Physician provider: Jacy Dorado MD  Reason for today's call: Results  Last office visit:N/A    Patient notified that their information will be routed to the [facility] clinical triage team for review. Patient is advised that they will receive a phone call from the triage department. If symptoms worsen before receiving a call back, the patient has been advised to proceed to the nearest ED.    Pt was returning Marge call to discuss her lab results. Marge was unable to take the call & ask I send to Triage,  but Marge did notate a TE on 03/15.

## 2024-03-18 NOTE — TELEPHONE ENCOUNTER
Reviewed progress note from 3/15/24. Call to patient to make aware of message. She appreciated the call back and stated she is scheduled this Friday to have her bone scan completed

## 2024-03-22 ENCOUNTER — HOSPITAL ENCOUNTER (OUTPATIENT)
Dept: NUCLEAR MEDICINE | Age: 69
End: 2024-03-22
Attending: INTERNAL MEDICINE
Payer: MEDICARE

## 2024-03-22 DIAGNOSIS — R79.89 ELEVATED LFTS: ICD-10-CM

## 2024-03-22 DIAGNOSIS — R74.8 ELEVATED ALKALINE PHOSPHATASE LEVEL: ICD-10-CM

## 2024-03-22 PROCEDURE — A9503 TC99M MEDRONATE: HCPCS | Performed by: INTERNAL MEDICINE

## 2024-03-22 PROCEDURE — 78306 BONE IMAGING WHOLE BODY: CPT | Performed by: INTERNAL MEDICINE

## 2024-03-22 PROCEDURE — 3430000000 HC RX DIAGNOSTIC RADIOPHARMACEUTICAL: Performed by: INTERNAL MEDICINE

## 2024-03-22 RX ORDER — TC 99M MEDRONATE 20 MG/10ML
27 INJECTION, POWDER, LYOPHILIZED, FOR SOLUTION INTRAVENOUS
Status: COMPLETED | OUTPATIENT
Start: 2024-03-22 | End: 2024-03-22

## 2024-03-22 RX ADMIN — TC 99M MEDRONATE 27 MILLICURIE: 20 INJECTION, POWDER, LYOPHILIZED, FOR SOLUTION INTRAVENOUS at 09:33

## 2024-03-26 PROBLEM — R79.89 ELEVATED LFTS: Status: ACTIVE | Noted: 2024-03-26

## 2024-03-26 PROBLEM — R74.8 ELEVATED ALKALINE PHOSPHATASE LEVEL: Status: ACTIVE | Noted: 2024-03-26

## 2024-03-26 PROBLEM — Z12.31 ENCOUNTER FOR SCREENING MAMMOGRAM FOR MALIGNANT NEOPLASM OF BREAST: Status: ACTIVE | Noted: 2024-03-26

## 2024-04-25 PROBLEM — Z12.31 ENCOUNTER FOR SCREENING MAMMOGRAM FOR MALIGNANT NEOPLASM OF BREAST: Status: RESOLVED | Noted: 2024-03-26 | Resolved: 2024-04-25

## 2024-06-27 ENCOUNTER — NURSE ONLY (OUTPATIENT)
Age: 69
End: 2024-06-27

## 2024-06-27 ENCOUNTER — OFFICE VISIT (OUTPATIENT)
Age: 69
End: 2024-06-27
Payer: MEDICARE

## 2024-06-27 VITALS
HEIGHT: 69 IN | BODY MASS INDEX: 28.73 KG/M2 | WEIGHT: 194 LBS | DIASTOLIC BLOOD PRESSURE: 60 MMHG | HEART RATE: 62 BPM | SYSTOLIC BLOOD PRESSURE: 88 MMHG

## 2024-06-27 DIAGNOSIS — Z95.810 BIVENTRICULAR AUTOMATIC IMPLANTABLE CARDIOVERTER DEFIBRILLATOR IN SITU: ICD-10-CM

## 2024-06-27 DIAGNOSIS — I50.22 CHRONIC SYSTOLIC CHF (CONGESTIVE HEART FAILURE) (HCC): Primary | ICD-10-CM

## 2024-06-27 DIAGNOSIS — I48.0 PAF (PAROXYSMAL ATRIAL FIBRILLATION) (HCC): Primary | ICD-10-CM

## 2024-06-27 PROCEDURE — 1090F PRES/ABSN URINE INCON ASSESS: CPT | Performed by: INTERNAL MEDICINE

## 2024-06-27 PROCEDURE — G8399 PT W/DXA RESULTS DOCUMENT: HCPCS | Performed by: INTERNAL MEDICINE

## 2024-06-27 PROCEDURE — G8417 CALC BMI ABV UP PARAM F/U: HCPCS | Performed by: INTERNAL MEDICINE

## 2024-06-27 PROCEDURE — 3017F COLORECTAL CA SCREEN DOC REV: CPT | Performed by: INTERNAL MEDICINE

## 2024-06-27 PROCEDURE — 93000 ELECTROCARDIOGRAM COMPLETE: CPT | Performed by: INTERNAL MEDICINE

## 2024-06-27 PROCEDURE — 1036F TOBACCO NON-USER: CPT | Performed by: INTERNAL MEDICINE

## 2024-06-27 PROCEDURE — 99204 OFFICE O/P NEW MOD 45 MIN: CPT | Performed by: INTERNAL MEDICINE

## 2024-06-27 PROCEDURE — G8427 DOCREV CUR MEDS BY ELIG CLIN: HCPCS | Performed by: INTERNAL MEDICINE

## 2024-06-27 PROCEDURE — 1123F ACP DISCUSS/DSCN MKR DOCD: CPT | Performed by: INTERNAL MEDICINE

## 2024-06-27 NOTE — PROGRESS NOTES
medications for this visit.     No Known Allergies  [unfilled]  Past Medical History:   Diagnosis Date    Arrhythmia     Atrial fibrillation (HCC) 3/10/2016    cardioversion 4/2016    CAD (coronary artery disease)     pt states she had a \"mild heart attack\". no PCI    Chronic systolic CHF (congestive heart failure) ([facility]) 6/24/2015    Diabetes ([facility])     pre-diabetic    GERD (gastroesophageal reflux disease)     Heart failure ([facility])     Hyperlipidemia 3/10/2016    ICD (implantable cardioverter-defibrillator) lead failure 6/24/2015    Insomnia w/ sleep apnea 1/11/2016    Nausea & vomiting     Nocturnal hypoxemia 1/11/2016    SHIRLEY (obstructive sleep apnea) 1/11/2016    cpap    Sleep apnea     Thyroid disease      Past Surgical History:   Procedure Laterality Date    HEENT      partial thyroidectomy    DEUCE BREAST LOC ADDITIONAL LESION LEFT Left 9/9/2022    DEUCE BREAST LOC ADDITIONAL LESION LEFT    PACEMAKER      ICD    [facility] UNLISTED PROCEDURE CARDIAC SURGERY      LHC - Cardioversion 5/16      Family History   Problem Relation Age of Onset    Hypertension Mother     Dementia Father     Cancer Mother         pancreatic     Social History     Tobacco Use    Smoking status: Never     Passive exposure: Never    Smokeless tobacco: Never   Substance Use Topics    Alcohol use: Not Currently       PHYSICAL EXAM:    BP (!) 88/60   Pulse 62   Ht 1.753 m (5' 9\")   Wt 88 kg (194 lb)   BMI 28.65 kg/m²      Wt Readings from Last 5 Encounters:   06/27/24 88 kg (194 lb)   04/19/24 88.5 kg (195 lb)   03/18/24 88.5 kg (195 lb)   03/15/24 88.9 kg (196 lb)   10/12/23 88.7 kg (195 lb 9.6 oz)       BP Readings from Last 5 Encounters:   06/27/24 (!) 88/60   04/19/24 108/60   03/18/24 98/60   03/15/24 (!) 106/57   10/12/23 (!) 84/59       General appearance: Alert, well appearing, and in no distress   CV: RRR, no M/R/G, no JVD, normal distal pulses, no lower extremity edema  Pulm: Clear to auscultation, no wheezes, no crackles, no accessory

## 2024-07-25 RX ORDER — SOTALOL HYDROCHLORIDE 120 MG/1
120 TABLET ORAL EVERY 12 HOURS
Qty: 60 TABLET | Refills: 11 | Status: SHIPPED | OUTPATIENT
Start: 2024-07-25

## 2024-07-25 NOTE — TELEPHONE ENCOUNTER
Requested Prescriptions     Pending Prescriptions Disp Refills    sotalol (BETAPACE) 120 MG tablet 60 tablet 11     Sig: Take 1 tablet by mouth in the morning and 1 tablet in the evening.    Verified rx in last OV date 6/27/24. Pharmacy confirmed. Erx as requested

## 2024-07-31 PROBLEM — E11.65 TYPE 2 DIABETES MELLITUS WITH HYPERGLYCEMIA, WITHOUT LONG-TERM CURRENT USE OF INSULIN (HCC): Status: ACTIVE | Noted: 2020-10-12

## 2024-07-31 PROBLEM — I42.9 CARDIOMYOPATHY (HCC): Status: ACTIVE | Noted: 2020-10-12

## 2024-07-31 PROBLEM — K21.9 ESOPHAGEAL REFLUX: Status: ACTIVE | Noted: 2018-06-04

## 2024-07-31 PROBLEM — M81.8 OTHER OSTEOPOROSIS WITHOUT CURRENT PATHOLOGICAL FRACTURE: Status: ACTIVE | Noted: 2023-05-23

## 2024-07-31 PROBLEM — E03.9 ACQUIRED HYPOTHYROIDISM: Status: ACTIVE | Noted: 2020-10-12

## 2024-07-31 PROBLEM — N60.91 ATYPICAL DUCTAL HYPERPLASIA OF RIGHT BREAST: Status: ACTIVE | Noted: 2018-07-17

## 2024-07-31 PROBLEM — S82.852A CLOSED TRIMALLEOLAR FRACTURE OF LEFT ANKLE: Status: ACTIVE | Noted: 2023-08-06

## 2024-07-31 PROBLEM — N18.30 CHRONIC KIDNEY DISEASE, STAGE III (MODERATE) (HCC): Status: ACTIVE | Noted: 2023-10-17

## 2024-09-17 ENCOUNTER — OFFICE VISIT (OUTPATIENT)
Age: 69
End: 2024-09-17
Payer: MEDICARE

## 2024-09-17 VITALS
WEIGHT: 199.6 LBS | DIASTOLIC BLOOD PRESSURE: 72 MMHG | HEIGHT: 69 IN | BODY MASS INDEX: 29.56 KG/M2 | SYSTOLIC BLOOD PRESSURE: 98 MMHG | HEART RATE: 64 BPM

## 2024-09-17 DIAGNOSIS — I50.22 CHRONIC SYSTOLIC CHF (CONGESTIVE HEART FAILURE) (HCC): ICD-10-CM

## 2024-09-17 DIAGNOSIS — Z95.810 BIVENTRICULAR AUTOMATIC IMPLANTABLE CARDIOVERTER DEFIBRILLATOR IN SITU: ICD-10-CM

## 2024-09-17 DIAGNOSIS — E78.00 PURE HYPERCHOLESTEROLEMIA: ICD-10-CM

## 2024-09-17 DIAGNOSIS — I48.19 PERSISTENT ATRIAL FIBRILLATION (HCC): Primary | ICD-10-CM

## 2024-09-17 PROCEDURE — G8417 CALC BMI ABV UP PARAM F/U: HCPCS | Performed by: INTERNAL MEDICINE

## 2024-09-17 PROCEDURE — 3017F COLORECTAL CA SCREEN DOC REV: CPT | Performed by: INTERNAL MEDICINE

## 2024-09-17 PROCEDURE — 1090F PRES/ABSN URINE INCON ASSESS: CPT | Performed by: INTERNAL MEDICINE

## 2024-09-17 PROCEDURE — G8399 PT W/DXA RESULTS DOCUMENT: HCPCS | Performed by: INTERNAL MEDICINE

## 2024-09-17 PROCEDURE — G8428 CUR MEDS NOT DOCUMENT: HCPCS | Performed by: INTERNAL MEDICINE

## 2024-09-17 PROCEDURE — 1036F TOBACCO NON-USER: CPT | Performed by: INTERNAL MEDICINE

## 2024-09-17 PROCEDURE — 1123F ACP DISCUSS/DSCN MKR DOCD: CPT | Performed by: INTERNAL MEDICINE

## 2024-09-17 PROCEDURE — 99214 OFFICE O/P EST MOD 30 MIN: CPT | Performed by: INTERNAL MEDICINE

## 2024-09-18 ENCOUNTER — HOSPITAL ENCOUNTER (OUTPATIENT)
Dept: MAMMOGRAPHY | Age: 69
Discharge: HOME | End: 2024-09-21
Attending: INTERNAL MEDICINE
Payer: MEDICARE

## 2024-09-18 DIAGNOSIS — D05.10 DUCTAL CARCINOMA IN SITU (DCIS) OF BREAST, UNSPECIFIED LATERALITY: ICD-10-CM

## 2024-09-18 DIAGNOSIS — Z12.31 ENCOUNTER FOR SCREENING MAMMOGRAM FOR MALIGNANT NEOPLASM OF BREAST: ICD-10-CM

## 2024-09-18 PROCEDURE — 77063 BREAST TOMOSYNTHESIS BI: CPT

## 2024-09-23 RX ORDER — ATORVASTATIN CALCIUM 20 MG/1
TABLET, FILM COATED ORAL
Qty: 90 TABLET | Refills: 3 | Status: SHIPPED | OUTPATIENT
Start: 2024-09-23

## 2024-09-24 DIAGNOSIS — D05.10 DUCTAL CARCINOMA IN SITU (DCIS) OF BREAST, UNSPECIFIED LATERALITY: Primary | ICD-10-CM

## 2024-10-02 ENCOUNTER — HOSPITAL ENCOUNTER (OUTPATIENT)
Dept: LAB | Age: 69
Discharge: HOME | End: 2024-10-02
Payer: MEDICARE

## 2024-10-02 ENCOUNTER — OFFICE VISIT (OUTPATIENT)
Dept: ONCOLOGY | Age: 69
End: 2024-10-02

## 2024-10-02 VITALS
WEIGHT: 199 LBS | SYSTOLIC BLOOD PRESSURE: 95 MMHG | HEART RATE: 60 BPM | RESPIRATION RATE: 16 BRPM | DIASTOLIC BLOOD PRESSURE: 55 MMHG | HEIGHT: 69 IN | OXYGEN SATURATION: 96 % | TEMPERATURE: 98.5 F | BODY MASS INDEX: 29.47 KG/M2

## 2024-10-02 DIAGNOSIS — D05.10 DUCTAL CARCINOMA IN SITU (DCIS) OF BREAST, UNSPECIFIED LATERALITY: ICD-10-CM

## 2024-10-02 DIAGNOSIS — Z79.811 AROMATASE INHIBITOR USE: Primary | ICD-10-CM

## 2024-10-02 LAB
ALBUMIN SERPL-MCNC: 3.9 G/DL (ref 3.2–4.6)
ALBUMIN/GLOB SERPL: 1.1 (ref 1–1.9)
ALP SERPL-CCNC: 146 U/L (ref 35–104)
ALT SERPL-CCNC: 8 U/L (ref 8–45)
ANION GAP SERPL CALC-SCNC: 10 MMOL/L (ref 9–18)
AST SERPL-CCNC: 20 U/L (ref 15–37)
BASOPHILS # BLD: 0 K/UL (ref 0–0.2)
BASOPHILS NFR BLD: 1 % (ref 0–2)
BILIRUB SERPL-MCNC: 0.6 MG/DL (ref 0–1.2)
BUN SERPL-MCNC: 23 MG/DL (ref 8–23)
CALCIUM SERPL-MCNC: 9.5 MG/DL (ref 8.8–10.2)
CHLORIDE SERPL-SCNC: 105 MMOL/L (ref 98–107)
CO2 SERPL-SCNC: 25 MMOL/L (ref 20–28)
CREAT SERPL-MCNC: 1.1 MG/DL (ref 0.6–1.1)
DIFFERENTIAL METHOD BLD: NORMAL
EOSINOPHIL # BLD: 0.1 K/UL (ref 0–0.8)
EOSINOPHIL NFR BLD: 2 % (ref 0.5–7.8)
ERYTHROCYTE [DISTWIDTH] IN BLOOD BY AUTOMATED COUNT: 13.3 % (ref 11.9–14.6)
GLOBULIN SER CALC-MCNC: 3.6 G/DL (ref 2.3–3.5)
GLUCOSE SERPL-MCNC: 96 MG/DL (ref 70–99)
HCT VFR BLD AUTO: 44.3 % (ref 35.8–46.3)
HGB BLD-MCNC: 14.3 G/DL (ref 11.7–15.4)
IMM GRANULOCYTES # BLD AUTO: 0 K/UL (ref 0–0.5)
IMM GRANULOCYTES NFR BLD AUTO: 0 % (ref 0–5)
LYMPHOCYTES # BLD: 1.8 K/UL (ref 0.5–4.6)
LYMPHOCYTES NFR BLD: 28 % (ref 13–44)
MCH RBC QN AUTO: 30.2 PG (ref 26.1–32.9)
MCHC RBC AUTO-ENTMCNC: 32.3 G/DL (ref 31.4–35)
MCV RBC AUTO: 93.5 FL (ref 82–102)
MONOCYTES # BLD: 0.4 K/UL (ref 0.1–1.3)
MONOCYTES NFR BLD: 6 % (ref 4–12)
NEUTS SEG # BLD: 4 K/UL (ref 1.7–8.2)
NEUTS SEG NFR BLD: 63 % (ref 43–78)
NRBC # BLD: 0 K/UL (ref 0–0.2)
PLATELET # BLD AUTO: 216 K/UL (ref 150–450)
PMV BLD AUTO: 9.9 FL (ref 9.4–12.3)
POTASSIUM SERPL-SCNC: 4.5 MMOL/L (ref 3.5–5.1)
PROT SERPL-MCNC: 7.5 G/DL (ref 6.3–8.2)
RBC # BLD AUTO: 4.74 M/UL (ref 4.05–5.2)
SODIUM SERPL-SCNC: 140 MMOL/L (ref 136–145)
WBC # BLD AUTO: 6.3 K/UL (ref 4.3–11.1)

## 2024-10-02 PROCEDURE — 36415 COLL VENOUS BLD VENIPUNCTURE: CPT

## 2024-10-02 PROCEDURE — 80053 COMPREHEN METABOLIC PANEL: CPT

## 2024-10-02 PROCEDURE — 85025 COMPLETE CBC W/AUTO DIFF WBC: CPT

## 2024-10-02 RX ORDER — LETROZOLE 2.5 MG/1
2.5 TABLET, FILM COATED ORAL DAILY
Qty: 90 TABLET | Refills: 3 | Status: SHIPPED | OUTPATIENT
Start: 2024-10-02

## 2024-10-02 RX ORDER — MOMETASONE FUROATE 1 MG/ML
SOLUTION TOPICAL
COMMUNITY
Start: 2024-09-26

## 2024-10-02 ASSESSMENT — PATIENT HEALTH QUESTIONNAIRE - PHQ9
SUM OF ALL RESPONSES TO PHQ QUESTIONS 1-9: 0
2. FEELING DOWN, DEPRESSED OR HOPELESS: NOT AT ALL
SUM OF ALL RESPONSES TO PHQ QUESTIONS 1-9: 0
1. LITTLE INTEREST OR PLEASURE IN DOING THINGS: NOT AT ALL
SUM OF ALL RESPONSES TO PHQ9 QUESTIONS 1 & 2: 0
SUM OF ALL RESPONSES TO PHQ QUESTIONS 1-9: 0
SUM OF ALL RESPONSES TO PHQ QUESTIONS 1-9: 0

## 2024-10-11 ASSESSMENT — ENCOUNTER SYMPTOMS
VOMITING: 0
CHEST TIGHTNESS: 0
ABDOMINAL DISTENTION: 0
SHORTNESS OF BREATH: 0
BLOOD IN STOOL: 0
NAUSEA: 0
DIARRHEA: 0
WHEEZING: 0
SORE THROAT: 0
ABDOMINAL PAIN: 0
CONSTIPATION: 0
SCLERAL ICTERUS: 0
VOICE CHANGE: 0
HEMOPTYSIS: 0
TROUBLE SWALLOWING: 0

## 2024-10-11 NOTE — PROGRESS NOTES
discussion.    - Patients with DCIS undergo local treatment with either mastectomy or BCT followed by adjuvant radiation (unless low-risk disease).  The decision to administer endocrine therapy for risk reduction of subsequent cancers depends on choice of local therapy and receptor status.  Five years of endocrine therapy is offered to women with ER/MT positive disease who undergo unilateral mastectomy or BCT.    - The primary role of systemic therapy is risk reduction of invasive breast cancer in ipsi/contralateral breast.  Chemotherapy plays no role in management of DCIS.  About 50-75% of DCIS lesions express ER/MT.  Tamoxifen is approved for adjuvant therapy to prevent invasive breast cancer recurrence in women with DCIS, but AI is also an acceptable option.  Trials have shown that postoperative endocrine therapy with/without adjuvant RT, is effective in reducing risk of DCIS/invasive disease without survival benefit for women treated with BCT with ER/MT positive diease.  Chemoprevention does not prevent ER negative disease.    - Side effects include endometrial cancer, thromboembolic events, fatigue, joint stiffness and arthritis, hot flashes and mood changes to name a few.    -  here for follow-up after mastectomy.  She feels fantastic.  She is healed nicely.  - At this time, we will proceed with AI.  She will try letrozole and let us know if this is tolerable.  Side effect profile reviewed in detail and patient was given information regarding tamoxifen and aromatase inhibitor side effects for her reference.    - AIs can cause hot flashes, HTN, angina, swelling, fatigue, bone thinning leading to osteoporosis/fractures, joint stiffness, N/V, hair thinning, weight changes, thrombosis and worsening depression to name a few.  - According to patient, her tumor was ER/MT positive.  We will request a an official report from outside facility for our records.  Final mastectomy pathology was negative for residual

## 2024-10-15 ENCOUNTER — OFFICE VISIT (OUTPATIENT)
Dept: OBGYN CLINIC | Age: 69
End: 2024-10-15
Payer: MEDICARE

## 2024-10-15 VITALS
DIASTOLIC BLOOD PRESSURE: 62 MMHG | WEIGHT: 199 LBS | BODY MASS INDEX: 29.47 KG/M2 | SYSTOLIC BLOOD PRESSURE: 110 MMHG | HEIGHT: 69 IN

## 2024-10-15 DIAGNOSIS — N81.2 INCOMPLETE UTEROVAGINAL PROLAPSE: Primary | ICD-10-CM

## 2024-10-15 DIAGNOSIS — N81.4 CYSTOCELE WITH PROLAPSE: ICD-10-CM

## 2024-10-15 PROCEDURE — 1123F ACP DISCUSS/DSCN MKR DOCD: CPT | Performed by: OBSTETRICS & GYNECOLOGY

## 2024-10-15 PROCEDURE — G8399 PT W/DXA RESULTS DOCUMENT: HCPCS | Performed by: OBSTETRICS & GYNECOLOGY

## 2024-10-15 PROCEDURE — 99202 OFFICE O/P NEW SF 15 MIN: CPT | Performed by: OBSTETRICS & GYNECOLOGY

## 2024-10-15 PROCEDURE — 1090F PRES/ABSN URINE INCON ASSESS: CPT | Performed by: OBSTETRICS & GYNECOLOGY

## 2024-10-15 PROCEDURE — G8417 CALC BMI ABV UP PARAM F/U: HCPCS | Performed by: OBSTETRICS & GYNECOLOGY

## 2024-10-15 PROCEDURE — G8427 DOCREV CUR MEDS BY ELIG CLIN: HCPCS | Performed by: OBSTETRICS & GYNECOLOGY

## 2024-10-15 PROCEDURE — 3017F COLORECTAL CA SCREEN DOC REV: CPT | Performed by: OBSTETRICS & GYNECOLOGY

## 2024-10-15 PROCEDURE — G8484 FLU IMMUNIZE NO ADMIN: HCPCS | Performed by: OBSTETRICS & GYNECOLOGY

## 2024-10-15 PROCEDURE — 1036F TOBACCO NON-USER: CPT | Performed by: OBSTETRICS & GYNECOLOGY

## 2024-10-15 SDOH — ECONOMIC STABILITY: FOOD INSECURITY: WITHIN THE PAST 12 MONTHS, THE FOOD YOU BOUGHT JUST DIDN'T LAST AND YOU DIDN'T HAVE MONEY TO GET MORE.: NEVER TRUE

## 2024-10-15 SDOH — ECONOMIC STABILITY: FOOD INSECURITY: WITHIN THE PAST 12 MONTHS, YOU WORRIED THAT YOUR FOOD WOULD RUN OUT BEFORE YOU GOT MONEY TO BUY MORE.: NEVER TRUE

## 2024-10-15 SDOH — ECONOMIC STABILITY: INCOME INSECURITY: HOW HARD IS IT FOR YOU TO PAY FOR THE VERY BASICS LIKE FOOD, HOUSING, MEDICAL CARE, AND HEATING?: NOT HARD AT ALL

## 2024-10-15 NOTE — PROGRESS NOTES
Sylvia  is a 69 y.o. female, , No LMP recorded. Patient is postmenopausal.,  who presents with c/o New Patient and Uterine Prolapse (Pt was diagnosed with uterine prolapse in  this year.)      Reports prolapse started 5 months ago. Symptoms have been gradually worsening since. Associated symptoms include:  urinary incontinence (no) and pelvic prolapse  Aggravating factors: exertion.  Alleviating actions: nothing      Contraception:  post menopausal status    HISTORY:  Sylvia     has a past medical history of Arrhythmia, Atrial fibrillation (HCC), Breast cancer (HCC), CAD (coronary artery disease), Cancer (HCC), Chronic kidney disease, stage III (moderate) (HCC), Chronic systolic CHF (congestive heart failure) (HCC), Diabetes (HCC), GERD (gastroesophageal reflux disease), Heart disease, Heart failure (HCC), Hyperlipidemia, ICD (implantable cardioverter-defibrillator) lead failure, Insomnia w/ sleep apnea, Nausea & vomiting, Nocturnal hypoxemia, SHIRLEY (obstructive sleep apnea), Sleep apnea, and Thyroid disease.    has a past surgical history that includes heent; pacemaker; pr unlisted procedure cardiac surgery; [facility] PLACE BREAST LOC DEVICE PERC ADDL LESION LEFT (Left, 2022); Tubal ligation (); and Mastectomy (Right)..    Her current meds are   Current Outpatient Medications:     mometasone (ELOCON) 0.1 % lotion, , Disp: , Rfl:     letrozole (FEMARA) 2.5 MG tablet, Take 1 tablet by mouth daily, Disp: 90 tablet, Rfl: 3    atorvastatin (LIPITOR) 20 MG tablet, TAKE 1 TABLET BY MOUTH EVERY DAY FOR CHOLESTEROL, Disp: 90 tablet, Rfl: 3    sotalol (BETAPACE) 120 MG tablet, Take 1 tablet by mouth in the morning and 1 tablet in the evening., Disp: 60 tablet, Rfl: 11    sacubitril-valsartan (ENTRESTO) 24-26 MG per tablet, Take 1 tablet by mouth 2 times daily, Disp: 60 tablet, Rfl: 11    spironolactone (ALDACTONE) 25 MG tablet, Take 1 tablet by mouth daily, Disp: 30 tablet, Rfl: 11    metoprolol succinate (TOPROL

## 2024-10-23 RX ORDER — SOTALOL HYDROCHLORIDE 120 MG/1
TABLET ORAL
Qty: 180 TABLET | Refills: 3 | Status: SHIPPED | OUTPATIENT
Start: 2024-10-23

## 2024-10-23 NOTE — TELEPHONE ENCOUNTER
Requested Prescriptions     Pending Prescriptions Disp Refills    sotalol (BETAPACE) 120 MG tablet [Pharmacy Med Name: SOTALOL 120 MG TABLET] 180 tablet 3     Sig: TAKE 1 TABLET BY MOUTH EVERY MORNING TAKE 1 TABLET BY MOUTH EVERY EVENING    Verified rx. Last OV 09/17/24. Erx to pharm on file.

## 2024-10-24 ENCOUNTER — OFFICE VISIT (OUTPATIENT)
Dept: UROGYNECOLOGY | Age: 69
End: 2024-10-24
Payer: MEDICARE

## 2024-10-24 VITALS — WEIGHT: 201 LBS | BODY MASS INDEX: 29.77 KG/M2 | HEIGHT: 69 IN

## 2024-10-24 DIAGNOSIS — N81.89 WEAKNESS OF PELVIC FLOOR: Primary | ICD-10-CM

## 2024-10-24 DIAGNOSIS — N81.3 UTEROVAGINAL PROLAPSE, COMPLETE: ICD-10-CM

## 2024-10-24 LAB
BILIRUBIN, URINE, POC: NEGATIVE
BLOOD URINE, POC: NEGATIVE
GLUCOSE URINE, POC: 500
KETONES, URINE, POC: NEGATIVE
LEUKOCYTE ESTERASE, URINE, POC: NEGATIVE
NITRITE, URINE, POC: NEGATIVE
PH, URINE, POC: 5.5 (ref 4.6–8)
PROTEIN,URINE, POC: NEGATIVE
SPECIFIC GRAVITY, URINE, POC: 1.01 (ref 1–1.03)
URINALYSIS CLARITY, POC: CLEAR
URINALYSIS COLOR, POC: YELLOW
UROBILINOGEN, POC: NORMAL

## 2024-10-24 PROCEDURE — 99459 PELVIC EXAMINATION: CPT | Performed by: OBSTETRICS & GYNECOLOGY

## 2024-10-24 PROCEDURE — 1090F PRES/ABSN URINE INCON ASSESS: CPT | Performed by: OBSTETRICS & GYNECOLOGY

## 2024-10-24 PROCEDURE — G8417 CALC BMI ABV UP PARAM F/U: HCPCS | Performed by: OBSTETRICS & GYNECOLOGY

## 2024-10-24 PROCEDURE — 99204 OFFICE O/P NEW MOD 45 MIN: CPT | Performed by: OBSTETRICS & GYNECOLOGY

## 2024-10-24 PROCEDURE — 1159F MED LIST DOCD IN RCRD: CPT | Performed by: OBSTETRICS & GYNECOLOGY

## 2024-10-24 PROCEDURE — 81003 URINALYSIS AUTO W/O SCOPE: CPT | Performed by: OBSTETRICS & GYNECOLOGY

## 2024-10-24 PROCEDURE — 1036F TOBACCO NON-USER: CPT | Performed by: OBSTETRICS & GYNECOLOGY

## 2024-10-24 PROCEDURE — G8399 PT W/DXA RESULTS DOCUMENT: HCPCS | Performed by: OBSTETRICS & GYNECOLOGY

## 2024-10-24 PROCEDURE — 3017F COLORECTAL CA SCREEN DOC REV: CPT | Performed by: OBSTETRICS & GYNECOLOGY

## 2024-10-24 PROCEDURE — 1123F ACP DISCUSS/DSCN MKR DOCD: CPT | Performed by: OBSTETRICS & GYNECOLOGY

## 2024-10-24 PROCEDURE — G8484 FLU IMMUNIZE NO ADMIN: HCPCS | Performed by: OBSTETRICS & GYNECOLOGY

## 2024-10-24 PROCEDURE — 51701 INSERT BLADDER CATHETER: CPT | Performed by: OBSTETRICS & GYNECOLOGY

## 2024-10-24 PROCEDURE — G8427 DOCREV CUR MEDS BY ELIG CLIN: HCPCS | Performed by: OBSTETRICS & GYNECOLOGY

## 2024-10-24 NOTE — ASSESSMENT & PLAN NOTE
She has weak PFM and poor coordination of the muscles during exam. We discussed the purpose of physical therapy which is to strengthen the pelvic floor muscles and teach proper coordination of those muscles. I described the anatomy of those muscles involved and their relationship to the end-organs in the pelvis. I described therapy techniques which include a combination of therapeutic exercise, biofeedback, neuromuscular re-education, home programs, and electrical stimulation, as well as therapeutic massage and ultrasound for pain.    I recommend Physical therapy and will send a referral post op.

## 2024-10-24 NOTE — PROGRESS NOTES
lb)   BMI 29.68 kg/m²     Physical Exam  Vitals reviewed. Exam conducted with a chaperone present.   Constitutional:       General: She is not in acute distress.     Appearance: Normal appearance.   HENT:      Head: Normocephalic and atraumatic.   Cardiovascular:      Heart sounds: Normal heart sounds.   Pulmonary:      Effort: Pulmonary effort is normal. No respiratory distress.   Abdominal:      General: There is no distension.      Palpations: There is no mass.      Tenderness: There is no abdominal tenderness. There is no guarding or rebound.      Hernia: No hernia is present.   Musculoskeletal:      Cervical back: Normal range of motion.   Skin:     General: Skin is warm and dry.   Neurological:      Mental Status: She is alert and oriented to person, place, and time.   Psychiatric:         Mood and Affect: Mood normal.         Behavior: Behavior normal.         Thought Content: Thought content normal.         Judgment: Judgment normal.          Female Genitourinary This includes at least 4 minutes of clinical staff time associated with chaperoning a pelvic exam.  Vulva:    Normal. No lesions  Bartholin's Gland:  Bilateral , Normal, nontender  Skenes Gland:  Bilateral, Normal, nontender   Clitoris:  Normal.   Introitus:    Normal.   Urethral Meatus:  Normal appearing, normal size, no lesions, no prolapse  Urethra:  No masses, no tenderness  Vagina:  No atrophy, no discharge, no lesions  Cervix:  No lesions, no discharge  Uterus:  No tenderness, normal mobility   Adnexa:   No masses palpated, no tenderness  Bladder:  No tenderness, no masses palpated  Perineum:  Normal, no lesions    Rectal   Anorectal Exam: No hemorrhoids and no masses or lesions of the perineum    POP-Q: (Pelvic Organ Prolapse - Quantification Exam):      10/24/2024     1:00 PM   Pelvic Organ Prolapse Quantification   Anterior Wall (Aa) 1   Anterior Wall (Ba) 1   Cervix or Cuff (C) 0   Genital Haitus (gh) 2   Perineal Body (pb) 1   Total

## 2024-10-25 ENCOUNTER — TELEPHONE (OUTPATIENT)
Age: 69
End: 2024-10-25

## 2024-10-25 NOTE — TELEPHONE ENCOUNTER
Confirmed KILLIAN on her SJM BIV ICD.  Scheduled with Mary Valenzuela next month.  Patient verbalized understanding.

## 2024-11-26 ENCOUNTER — OFFICE VISIT (OUTPATIENT)
Age: 69
End: 2024-11-26
Payer: MEDICARE

## 2024-11-26 VITALS
HEIGHT: 69 IN | DIASTOLIC BLOOD PRESSURE: 70 MMHG | BODY MASS INDEX: 30.07 KG/M2 | HEART RATE: 70 BPM | SYSTOLIC BLOOD PRESSURE: 104 MMHG | WEIGHT: 203 LBS

## 2024-11-26 DIAGNOSIS — Z95.810 BIVENTRICULAR AUTOMATIC IMPLANTABLE CARDIOVERTER DEFIBRILLATOR IN SITU: Primary | ICD-10-CM

## 2024-11-26 DIAGNOSIS — I50.22 CHRONIC SYSTOLIC CHF (CONGESTIVE HEART FAILURE) (HCC): ICD-10-CM

## 2024-11-26 DIAGNOSIS — I48.0 PAF (PAROXYSMAL ATRIAL FIBRILLATION) (HCC): ICD-10-CM

## 2024-11-26 PROCEDURE — 1036F TOBACCO NON-USER: CPT | Performed by: PHYSICIAN ASSISTANT

## 2024-11-26 PROCEDURE — G8399 PT W/DXA RESULTS DOCUMENT: HCPCS | Performed by: PHYSICIAN ASSISTANT

## 2024-11-26 PROCEDURE — 1090F PRES/ABSN URINE INCON ASSESS: CPT | Performed by: PHYSICIAN ASSISTANT

## 2024-11-26 PROCEDURE — 1160F RVW MEDS BY RX/DR IN RCRD: CPT | Performed by: PHYSICIAN ASSISTANT

## 2024-11-26 PROCEDURE — G8484 FLU IMMUNIZE NO ADMIN: HCPCS | Performed by: PHYSICIAN ASSISTANT

## 2024-11-26 PROCEDURE — 1159F MED LIST DOCD IN RCRD: CPT | Performed by: PHYSICIAN ASSISTANT

## 2024-11-26 PROCEDURE — 3017F COLORECTAL CA SCREEN DOC REV: CPT | Performed by: PHYSICIAN ASSISTANT

## 2024-11-26 PROCEDURE — G8417 CALC BMI ABV UP PARAM F/U: HCPCS | Performed by: PHYSICIAN ASSISTANT

## 2024-11-26 PROCEDURE — 93000 ELECTROCARDIOGRAM COMPLETE: CPT | Performed by: PHYSICIAN ASSISTANT

## 2024-11-26 PROCEDURE — 1123F ACP DISCUSS/DSCN MKR DOCD: CPT | Performed by: PHYSICIAN ASSISTANT

## 2024-11-26 PROCEDURE — G8427 DOCREV CUR MEDS BY ELIG CLIN: HCPCS | Performed by: PHYSICIAN ASSISTANT

## 2024-11-26 PROCEDURE — 99214 OFFICE O/P EST MOD 30 MIN: CPT | Performed by: PHYSICIAN ASSISTANT

## 2024-11-26 PROCEDURE — 1126F AMNT PAIN NOTED NONE PRSNT: CPT | Performed by: PHYSICIAN ASSISTANT

## 2024-11-26 NOTE — PROGRESS NOTES
[facility] CARDIOLOGY, [address], [address]  [place] [address]  PHONE: [phone]  1955    SUBJECTIVE:   Sylvia Aguila is a 69 y.o. female seen for a follow up visit regarding the following:     Chief Complaint   Patient presents with    Other     [facility] BIV ICD at [facility]     HPI:    Sylvia Aguila is a very pleasant 69 y.o. female with a past medical and cardiac history significant for cardiomyopathy, chronic systolic heart failure, coronary artery disease, persistent atrial fibrillation s/p ablation, [facility] Nish CRT-D (2016), Left Atrial Thrombus, MI (2014), SHIRLEY with CPAP, s/p partial thyroidectomy with hypothyroidism, prediabetes, insitu breast cancer (dx 2023 w/ mastectomy +adjuvant therapy w/ femara, and GERD who presents today to discuss BiV ICD generator change as device is at [facility].  Device is [facility] Nish located on left.  Leads are functioning properly.  CRT pacing 94%%. Patient is not pacer dependent. Patient feels well. Denies chest pain, palpitations or shortness of breath.     Cardiac PMH: (Old records have been reviewed and summarized below)  TTE (4/22/2024): EF 35-40%     Reviewed office note Dr Jessica 10/8/2024    Past Medical History, Past Surgical History, Family history, Social History, and Medications were all reviewed with the patient today and updated as necessary.     Current Outpatient Medications   Medication Sig Dispense Refill    sotalol (BETAPACE) 120 MG tablet TAKE 1 TABLET BY MOUTH EVERY MORNING TAKE 1 TABLET BY MOUTH EVERY EVENING 180 tablet 3    mometasone (ELOCON) 0.1 % lotion       letrozole (FEMARA) 2.5 MG tablet Take 1 tablet by mouth daily 90 tablet 3    atorvastatin (LIPITOR) 20 MG tablet TAKE 1 TABLET BY MOUTH EVERY DAY FOR CHOLESTEROL 90 tablet 3    sacubitril-valsartan (ENTRESTO) 24-26 MG per tablet Take 1 tablet by mouth 2 times daily 60 tablet 11    spironolactone (ALDACTONE) 25 MG tablet Take 1 tablet by mouth daily 30 tablet 11    metoprolol succinate

## 2024-12-02 ENCOUNTER — TELEPHONE (OUTPATIENT)
Age: 69
End: 2024-12-02

## 2024-12-02 DIAGNOSIS — Z45.02 ICD (IMPLANTABLE CARDIOVERTER-DEFIBRILLATOR) BATTERY DEPLETION: Primary | ICD-10-CM

## 2024-12-06 DIAGNOSIS — Z45.02 ICD (IMPLANTABLE CARDIOVERTER-DEFIBRILLATOR) BATTERY DEPLETION: ICD-10-CM

## 2024-12-06 LAB
ANION GAP SERPL CALC-SCNC: 9 MMOL/L (ref 7–16)
BASOPHILS # BLD: 0 K/UL (ref 0–0.2)
BASOPHILS NFR BLD: 0 % (ref 0–2)
BUN SERPL-MCNC: 17 MG/DL (ref 8–23)
CALCIUM SERPL-MCNC: 9.3 MG/DL (ref 8.8–10.2)
CHLORIDE SERPL-SCNC: 102 MMOL/L (ref 98–107)
CO2 SERPL-SCNC: 29 MMOL/L (ref 20–29)
CREAT SERPL-MCNC: 1.14 MG/DL (ref 0.6–1.1)
DIFFERENTIAL METHOD BLD: NORMAL
EOSINOPHIL # BLD: 0.1 K/UL (ref 0–0.8)
EOSINOPHIL NFR BLD: 2 % (ref 0.5–7.8)
ERYTHROCYTE [DISTWIDTH] IN BLOOD BY AUTOMATED COUNT: 13.2 % (ref 11.9–14.6)
GLUCOSE SERPL-MCNC: 89 MG/DL (ref 70–99)
HCT VFR BLD AUTO: 44.2 % (ref 35.8–46.3)
HGB BLD-MCNC: 14.2 G/DL (ref 11.7–15.4)
IMM GRANULOCYTES # BLD AUTO: 0 K/UL (ref 0–0.5)
IMM GRANULOCYTES NFR BLD AUTO: 0 % (ref 0–5)
LYMPHOCYTES # BLD: 2.2 K/UL (ref 0.5–4.6)
LYMPHOCYTES NFR BLD: 34 % (ref 13–44)
MAGNESIUM SERPL-MCNC: 2 MG/DL (ref 1.8–2.4)
MCH RBC QN AUTO: 30.1 PG (ref 26.1–32.9)
MCHC RBC AUTO-ENTMCNC: 32.1 G/DL (ref 31.4–35)
MCV RBC AUTO: 93.8 FL (ref 82–102)
MONOCYTES # BLD: 0.5 K/UL (ref 0.1–1.3)
MONOCYTES NFR BLD: 8 % (ref 4–12)
NEUTS SEG # BLD: 3.8 K/UL (ref 1.7–8.2)
NEUTS SEG NFR BLD: 56 % (ref 43–78)
NRBC # BLD: 0 K/UL (ref 0–0.2)
PLATELET # BLD AUTO: 218 K/UL (ref 150–450)
PMV BLD AUTO: 10.1 FL (ref 9.4–12.3)
POTASSIUM SERPL-SCNC: 4.7 MMOL/L (ref 3.5–5.1)
RBC # BLD AUTO: 4.71 M/UL (ref 4.05–5.2)
SODIUM SERPL-SCNC: 139 MMOL/L (ref 136–145)
WBC # BLD AUTO: 6.7 K/UL (ref 4.3–11.1)

## 2024-12-17 ENCOUNTER — PROCEDURE VISIT (OUTPATIENT)
Dept: UROGYNECOLOGY | Age: 69
End: 2024-12-17
Payer: MEDICARE

## 2024-12-17 ENCOUNTER — OFFICE VISIT (OUTPATIENT)
Dept: UROGYNECOLOGY | Age: 69
End: 2024-12-17
Payer: MEDICARE

## 2024-12-17 DIAGNOSIS — N81.3 UTEROVAGINAL PROLAPSE, COMPLETE: Primary | ICD-10-CM

## 2024-12-17 PROCEDURE — 51741 ELECTRO-UROFLOWMETRY FIRST: CPT | Performed by: OBSTETRICS & GYNECOLOGY

## 2024-12-17 PROCEDURE — 51784 ANAL/URINARY MUSCLE STUDY: CPT | Performed by: OBSTETRICS & GYNECOLOGY

## 2024-12-17 PROCEDURE — G8399 PT W/DXA RESULTS DOCUMENT: HCPCS | Performed by: OBSTETRICS & GYNECOLOGY

## 2024-12-17 PROCEDURE — G8484 FLU IMMUNIZE NO ADMIN: HCPCS | Performed by: OBSTETRICS & GYNECOLOGY

## 2024-12-17 PROCEDURE — 3017F COLORECTAL CA SCREEN DOC REV: CPT | Performed by: OBSTETRICS & GYNECOLOGY

## 2024-12-17 PROCEDURE — G8417 CALC BMI ABV UP PARAM F/U: HCPCS | Performed by: OBSTETRICS & GYNECOLOGY

## 2024-12-17 PROCEDURE — 1036F TOBACCO NON-USER: CPT | Performed by: OBSTETRICS & GYNECOLOGY

## 2024-12-17 PROCEDURE — G8428 CUR MEDS NOT DOCUMENT: HCPCS | Performed by: OBSTETRICS & GYNECOLOGY

## 2024-12-17 PROCEDURE — 1090F PRES/ABSN URINE INCON ASSESS: CPT | Performed by: OBSTETRICS & GYNECOLOGY

## 2024-12-17 PROCEDURE — 1123F ACP DISCUSS/DSCN MKR DOCD: CPT | Performed by: OBSTETRICS & GYNECOLOGY

## 2024-12-17 PROCEDURE — 51729 CYSTOMETROGRAM W/VP&UP: CPT | Performed by: OBSTETRICS & GYNECOLOGY

## 2024-12-17 PROCEDURE — 99215 OFFICE O/P EST HI 40 MIN: CPT | Performed by: OBSTETRICS & GYNECOLOGY

## 2024-12-17 PROCEDURE — 51797 INTRAABDOMINAL PRESSURE TEST: CPT | Performed by: OBSTETRICS & GYNECOLOGY

## 2024-12-17 NOTE — PROGRESS NOTES
12/17/2024      Current Outpatient Medications:     sotalol (BETAPACE) 120 MG tablet, TAKE 1 TABLET BY MOUTH EVERY MORNING TAKE 1 TABLET BY MOUTH EVERY EVENING, Disp: 180 tablet, Rfl: 3    mometasone (ELOCON) 0.1 % lotion, , Disp: , Rfl:     letrozole (FEMARA) 2.5 MG tablet, Take 1 tablet by mouth daily, Disp: 90 tablet, Rfl: 3    atorvastatin (LIPITOR) 20 MG tablet, TAKE 1 TABLET BY MOUTH EVERY DAY FOR CHOLESTEROL, Disp: 90 tablet, Rfl: 3    sacubitril-valsartan (ENTRESTO) 24-26 MG per tablet, Take 1 tablet by mouth 2 times daily, Disp: 60 tablet, Rfl: 11    spironolactone (ALDACTONE) 25 MG tablet, Take 1 tablet by mouth daily, Disp: 30 tablet, Rfl: 11    metoprolol succinate (TOPROL XL) 25 MG extended release tablet, Take 1 tablet by mouth 2 times daily (Patient taking differently: Take by mouth 2 QAM THEN 1 PM), Disp: 270 tablet, Rfl: 3    apixaban (ELIQUIS) 5 MG TABS tablet, Take 1 tablet by mouth 2 times daily, Disp: 60 tablet, Rfl: 11    JARDIANCE 10 MG tablet, 1 tablet, Disp: , Rfl:     levothyroxine (SYNTHROID) 88 MCG tablet, Take by mouth every morning (before breakfast), Disp: , Rfl:     omeprazole (PRILOSEC) 20 MG delayed release capsule, Take 1 capsule by mouth daily, Disp: , Rfl:     No Known Allergies    Past Medical History:   Diagnosis Date    Arrhythmia     Atrial fibrillation (HCC) 3/10/2016    cardioversion 4/2016    Breast cancer (HCC)     CAD (coronary artery disease)     pt states she had a \"mild heart attack\". no PCI    Cancer (HCC) 09/2022    DCIS mastectomy right breast    Chronic kidney disease, stage III (moderate) ([facility]) 10/17/2023    Chronic systolic CHF (congestive heart failure) ([facility]) 6/24/2015    Diabetes (HCC)     pre-diabetic    GERD (gastroesophageal reflux disease)     Heart disease Jan/2014    Heart failure (HCC)     Hyperlipidemia 3/10/2016    ICD (implantable cardioverter-defibrillator) lead failure 6/24/2015    Insomnia w/ sleep apnea 1/11/2016    Nausea & vomiting

## 2024-12-17 NOTE — PROGRESS NOTES
Continence Procedure 5%    Risk of de sandy Urinary Incontinence without a Continence Procedure  26%    We discussed the epidemiology, pathogenesis and etiology of JACOB. We discussed the potential risk factors which include genetics and environmental factors, such as childbirth, aging, menopause, straining, and previous surgery. I offered her management options which included nothing, surgery at the time of POP surgery, or to wait and see if she develops JACOB after surgery and schedule JACOB surgery as a staged procedure. At this time the patient would like to add a sling.     Stress Incontinence  We discussed the differential diagnosis of urinary incontinence. We also discussed the pathogenesis and etiology of stress urinary incontinence. I explained the epidemiology of incontinence. I offered her options which include nothing, physical therapy, barrier treatment, and surgery. She is interested in surgical treatment.     I described the surgical technique to be employed for her upcoming surgery. We discussed the anticipated postoperative course.    TVT cure rates at 6 years is up to 85%.     We discussed the properties of polypropylene mesh. We discussed the inert nature and the potential for complication, including infections, rejection, and erosion. The risk of erosion is <10%.     We discussed the FDA warning on mesh use.     Risks, benefits, indications, and alternatives of the surgery were discussed. Risks reviewed include bleeding, infections, injury to pelvic organs (bladder, nerves, vessels, urethra, and ureters), recurrence, urinary retention, dyspareunia, anesthetic complications, and death. We discussed that other complications could arise and that the list is too long to discuss comprehensively.    After taking all of this into account, she elects to proceed with Robotic assisted laparoscopic total hysterectomy with removal of both fallopian tubes and ovaries, prolapse repair (sacrocolpopexy) with Y mesh,

## 2024-12-17 NOTE — ASSESSMENT & PLAN NOTE
Robotic Assisted Laparoscopic Sacrocolpopexy    We discussed the epidemiology, pathogenesis and etiology of pelvic organ prolapse. We discussed the potential risk factors which include genetics and environmental factors, such as childbirth, aging, menopause, straining, and previous surgery. I offered her management options which included nothing, pessary, and surgery.     We discussed her options of correcting the prolapse through a vaginal approach and laparoscopic approach. We also discussed repairing the vaginal prolapse with mesh and the option to do this without mesh. She has decided she would like to have a Robotic assisted laparoscopic total hysterectomy with removal of both fallopian tubes and ovaries, prolapse repair (sacrocolpopexy) with Y mesh, midurethral sling (TOT) and camera in the bladder (cystoscopy). Possible vaginal prolapse repair.     I described the surgical technique to be employed for her upcoming surgery. We discussed the anticipated postoperative course.    We discussed using the [employer] robot for assisting in the surgical procedure. I explained the function and purpose of the robot which greatly enhances my ability to perform the reconstruction. We discussed that the care may not be able to be completed laparoscopically and that a laparotomy may become necessary. There is a >80% success rate.    We discussed the cause of uterine prolapse. We discussed that the uterus itself is usually normal. We discussed the options of prolapse repair with hysterectomy or prolapse repair with uterine suspension. We discussed the risk of uterine cancer. We discussed that some patients do fail, although not all require another surgery. We discussed the risks of repair which include pain, dysparunia, bladder and/or bowel dysfunction and sexual dysfunction.    We discussed the properties of polypropylene mesh. We discussed the inert nature and the potential for complication, including infections, rejection,

## 2024-12-19 ENCOUNTER — ANESTHESIA EVENT (OUTPATIENT)
Dept: CARDIAC CATH/INVASIVE PROCEDURES | Age: 69
End: 2024-12-19
Payer: MEDICARE

## 2024-12-19 RX ORDER — IPRATROPIUM BROMIDE AND ALBUTEROL SULFATE 2.5; .5 MG/3ML; MG/3ML
1 SOLUTION RESPIRATORY (INHALATION)
Status: CANCELLED | OUTPATIENT
Start: 2024-12-19 | End: 2024-12-20

## 2024-12-19 RX ORDER — HALOPERIDOL 5 MG/ML
1 INJECTION INTRAMUSCULAR
Status: CANCELLED | OUTPATIENT
Start: 2024-12-19 | End: 2024-12-20

## 2024-12-19 RX ORDER — ONDANSETRON 2 MG/ML
4 INJECTION INTRAMUSCULAR; INTRAVENOUS
Status: CANCELLED | OUTPATIENT
Start: 2024-12-19 | End: 2024-12-20

## 2024-12-19 RX ORDER — SODIUM CHLORIDE 0.9 % (FLUSH) 0.9 %
5-40 SYRINGE (ML) INJECTION PRN
Status: CANCELLED | OUTPATIENT
Start: 2024-12-19

## 2024-12-19 RX ORDER — SODIUM CHLORIDE 0.9 % (FLUSH) 0.9 %
5-40 SYRINGE (ML) INJECTION EVERY 12 HOURS SCHEDULED
Status: CANCELLED | OUTPATIENT
Start: 2024-12-19

## 2024-12-19 RX ORDER — SODIUM CHLORIDE, SODIUM LACTATE, POTASSIUM CHLORIDE, CALCIUM CHLORIDE 600; 310; 30; 20 MG/100ML; MG/100ML; MG/100ML; MG/100ML
INJECTION, SOLUTION INTRAVENOUS CONTINUOUS
Status: CANCELLED | OUTPATIENT
Start: 2024-12-19

## 2024-12-19 RX ORDER — NALOXONE HYDROCHLORIDE 0.4 MG/ML
INJECTION, SOLUTION INTRAMUSCULAR; INTRAVENOUS; SUBCUTANEOUS PRN
Status: CANCELLED | OUTPATIENT
Start: 2024-12-19

## 2024-12-19 RX ORDER — LIDOCAINE HYDROCHLORIDE 10 MG/ML
1 INJECTION, SOLUTION INFILTRATION; PERINEURAL
Status: CANCELLED | OUTPATIENT
Start: 2024-12-19 | End: 2024-12-20

## 2024-12-19 NOTE — PROGRESS NOTES
Patient pre-assessment complete for device generator change scheduled for 24, arrival time 0830. Patient verified using . Patient instructed to bring a list of all home medications on the day of procedure. NPO status reinforced.  Patient instructed to HOLD Eliquis, Entresto, Spironolactone, and Jardiance. Instructed they can take all other medications excluding vitamins & supplements. Patient verbalizes understanding of all instructions & denies any questions at this time.

## 2024-12-20 ENCOUNTER — HOSPITAL ENCOUNTER (OUTPATIENT)
Age: 69
Setting detail: OUTPATIENT SURGERY
Discharge: HOME | End: 2024-12-20
Attending: INTERNAL MEDICINE | Admitting: INTERNAL MEDICINE
Payer: MEDICARE

## 2024-12-20 ENCOUNTER — ANESTHESIA (OUTPATIENT)
Dept: CARDIAC CATH/INVASIVE PROCEDURES | Age: 69
End: 2024-12-20
Payer: MEDICARE

## 2024-12-20 VITALS
BODY MASS INDEX: 29.62 KG/M2 | TEMPERATURE: 98 F | HEART RATE: 60 BPM | HEIGHT: 69 IN | OXYGEN SATURATION: 95 % | WEIGHT: 200 LBS | DIASTOLIC BLOOD PRESSURE: 59 MMHG | SYSTOLIC BLOOD PRESSURE: 91 MMHG | RESPIRATION RATE: 17 BRPM

## 2024-12-20 DIAGNOSIS — Z45.02 ICD (IMPLANTABLE CARDIOVERTER-DEFIBRILLATOR) BATTERY DEPLETION: ICD-10-CM

## 2024-12-20 LAB
ANION GAP SERPL CALC-SCNC: 11 MMOL/L (ref 7–16)
BUN SERPL-MCNC: 16 MG/DL (ref 8–23)
CALCIUM SERPL-MCNC: 9.5 MG/DL (ref 8.8–10.2)
CHLORIDE SERPL-SCNC: 103 MMOL/L (ref 98–107)
CO2 SERPL-SCNC: 26 MMOL/L (ref 20–29)
CREAT SERPL-MCNC: 1.07 MG/DL (ref 0.6–1.1)
ECHO BSA: 2.1 M2
EKG ATRIAL RATE: 60 BPM
EKG DIAGNOSIS: NORMAL
EKG P-R INTERVAL: 192 MS
EKG Q-T INTERVAL: 526 MS
EKG QRS DURATION: 144 MS
EKG QTC CALCULATION (BAZETT): 526 MS
EKG R AXIS: -75 DEGREES
EKG T AXIS: 108 DEGREES
EKG VENTRICULAR RATE: 60 BPM
ERYTHROCYTE [DISTWIDTH] IN BLOOD BY AUTOMATED COUNT: 13.2 % (ref 11.9–14.6)
GLUCOSE SERPL-MCNC: 111 MG/DL (ref 70–99)
HCT VFR BLD AUTO: 45.9 % (ref 35.8–46.3)
HGB BLD-MCNC: 14.7 G/DL (ref 11.7–15.4)
MAGNESIUM SERPL-MCNC: 2 MG/DL (ref 1.8–2.4)
MCH RBC QN AUTO: 29.9 PG (ref 26.1–32.9)
MCHC RBC AUTO-ENTMCNC: 32 G/DL (ref 31.4–35)
MCV RBC AUTO: 93.5 FL (ref 82–102)
NRBC # BLD: 0 K/UL (ref 0–0.2)
PLATELET # BLD AUTO: 234 K/UL (ref 150–450)
PMV BLD AUTO: 9.5 FL (ref 9.4–12.3)
POTASSIUM SERPL-SCNC: 4.4 MMOL/L (ref 3.5–5.1)
RBC # BLD AUTO: 4.91 M/UL (ref 4.05–5.2)
SODIUM SERPL-SCNC: 139 MMOL/L (ref 136–145)
WBC # BLD AUTO: 6.6 K/UL (ref 4.3–11.1)

## 2024-12-20 PROCEDURE — C1882 AICD, OTHER THAN SING/DUAL: HCPCS

## 2024-12-20 PROCEDURE — 2580000003 HC RX 258: Performed by: NURSE ANESTHETIST, CERTIFIED REGISTERED

## 2024-12-20 PROCEDURE — 3700000001 HC ADD 15 MINUTES (ANESTHESIA): Performed by: INTERNAL MEDICINE

## 2024-12-20 PROCEDURE — 6360000002 HC RX W HCPCS: Performed by: NURSE ANESTHETIST, CERTIFIED REGISTERED

## 2024-12-20 PROCEDURE — 85027 COMPLETE CBC AUTOMATED: CPT

## 2024-12-20 PROCEDURE — 3700000000 HC ANESTHESIA ATTENDED CARE: Performed by: INTERNAL MEDICINE

## 2024-12-20 PROCEDURE — 2500000003 HC RX 250 WO HCPCS: Performed by: NURSE ANESTHETIST, CERTIFIED REGISTERED

## 2024-12-20 PROCEDURE — 33264 RMVL & RPLCMT DFB GEN MLT LD: CPT | Performed by: INTERNAL MEDICINE

## 2024-12-20 PROCEDURE — 93010 ELECTROCARDIOGRAM REPORT: CPT | Performed by: INTERNAL MEDICINE

## 2024-12-20 PROCEDURE — 80048 BASIC METABOLIC PNL TOTAL CA: CPT

## 2024-12-20 PROCEDURE — 6360000002 HC RX W HCPCS: Performed by: INTERNAL MEDICINE

## 2024-12-20 PROCEDURE — 2500000003 HC RX 250 WO HCPCS: Performed by: INTERNAL MEDICINE

## 2024-12-20 PROCEDURE — 83735 ASSAY OF MAGNESIUM: CPT

## 2024-12-20 PROCEDURE — 2720000010 HC SURG SUPPLY STERILE: Performed by: INTERNAL MEDICINE

## 2024-12-20 PROCEDURE — 2709999900 HC NON-CHARGEABLE SUPPLY: Performed by: INTERNAL MEDICINE

## 2024-12-20 PROCEDURE — 93005 ELECTROCARDIOGRAM TRACING: CPT | Performed by: INTERNAL MEDICINE

## 2024-12-20 RX ORDER — PROPOFOL 10 MG/ML
INJECTION, EMULSION INTRAVENOUS
Status: DISCONTINUED | OUTPATIENT
Start: 2024-12-20 | End: 2024-12-20 | Stop reason: SDUPTHER

## 2024-12-20 RX ORDER — LIDOCAINE HYDROCHLORIDE AND EPINEPHRINE 10; 10 MG/ML; UG/ML
INJECTION, SOLUTION INFILTRATION; PERINEURAL PRN
Status: DISCONTINUED | OUTPATIENT
Start: 2024-12-20 | End: 2024-12-20 | Stop reason: HOSPADM

## 2024-12-20 RX ORDER — SODIUM CHLORIDE, SODIUM LACTATE, POTASSIUM CHLORIDE, CALCIUM CHLORIDE 600; 310; 30; 20 MG/100ML; MG/100ML; MG/100ML; MG/100ML
INJECTION, SOLUTION INTRAVENOUS
Status: DISCONTINUED | OUTPATIENT
Start: 2024-12-20 | End: 2024-12-20 | Stop reason: SDUPTHER

## 2024-12-20 RX ORDER — LIDOCAINE HYDROCHLORIDE 20 MG/ML
INJECTION, SOLUTION EPIDURAL; INFILTRATION; INTRACAUDAL; PERINEURAL
Status: DISCONTINUED | OUTPATIENT
Start: 2024-12-20 | End: 2024-12-20 | Stop reason: SDUPTHER

## 2024-12-20 RX ORDER — EPHEDRINE SULFATE 5 MG/ML
INJECTION INTRAVENOUS
Status: DISCONTINUED | OUTPATIENT
Start: 2024-12-20 | End: 2024-12-20 | Stop reason: SDUPTHER

## 2024-12-20 RX ADMIN — PROPOFOL 40 MG: 10 INJECTION, EMULSION INTRAVENOUS at 11:04

## 2024-12-20 RX ADMIN — SODIUM CHLORIDE, SODIUM LACTATE, POTASSIUM CHLORIDE, AND CALCIUM CHLORIDE: 600; 310; 30; 20 INJECTION, SOLUTION INTRAVENOUS at 10:57

## 2024-12-20 RX ADMIN — PROPOFOL 120 MCG/KG/MIN: 10 INJECTION, EMULSION INTRAVENOUS at 11:05

## 2024-12-20 RX ADMIN — CEFAZOLIN 2000 MG: 2 INJECTION, POWDER, FOR SOLUTION INTRAMUSCULAR; INTRAVENOUS at 11:10

## 2024-12-20 RX ADMIN — EPHEDRINE SULFATE 10 MG: 5 INJECTION INTRAVENOUS at 11:28

## 2024-12-20 RX ADMIN — LIDOCAINE HYDROCHLORIDE 40 MG: 20 INJECTION, SOLUTION EPIDURAL; INFILTRATION; INTRACAUDAL; PERINEURAL at 11:04

## 2024-12-20 ASSESSMENT — PAIN SCALES - GENERAL: PAINLEVEL_OUTOF10: 0

## 2024-12-20 NOTE — ANESTHESIA PRE PROCEDURE
apnea) 1/11/2016    cpap   • Sleep apnea    • Thyroid disease        Past Surgical History:        Procedure Laterality Date   • BLADDER SURGERY     • HEENT      partial thyroidectomy   • DEUCE PLACE BREAST LOC DEVICE PERC ADDL LESION LEFT Left 09/09/2022    DEUCE BREAST LOC ADDITIONAL LESION LEFT   • MASTECTOMY Right    • PACEMAKER      ICD   • AL UNLISTED PROCEDURE CARDIAC SURGERY      [facility] - Cardioversion 5/16    • TUBAL LIGATION  1987       Social History:    Social History     Tobacco Use   • Smoking status: Never     Passive exposure: Never   • Smokeless tobacco: Never   Substance Use Topics   • Alcohol use: Not Currently                                Counseling given: Not Answered      Vital Signs (Current): There were no vitals filed for this visit.                                           BP Readings from Last 3 Encounters:   11/26/24 104/70   10/15/24 110/62   10/02/24 (!) 95/55       NPO Status:                                                                                 BMI:   Wt Readings from Last 3 Encounters:   11/26/24 92.1 kg (203 lb)   10/24/24 91.2 kg (201 lb)   10/15/24 90.3 kg (199 lb)     There is no height or weight on file to calculate BMI.    CBC:   Lab Results   Component Value Date/Time    WBC 6.7 12/06/2024 01:22 PM    RBC 4.71 12/06/2024 01:22 PM    HGB 14.2 12/06/2024 01:22 PM    HCT 44.2 12/06/2024 01:22 PM    MCV 93.8 12/06/2024 01:22 PM    RDW 13.2 12/06/2024 01:22 PM     12/06/2024 01:22 PM       CMP:   Lab Results   Component Value Date/Time     12/06/2024 01:22 PM    K 4.7 12/06/2024 01:22 PM     12/06/2024 01:22 PM    CO2 29 12/06/2024 01:22 PM    BUN 17 12/06/2024 01:22 PM    CREATININE 1.14 12/06/2024 01:22 PM    GFRAA >60 12/14/2019 04:13 AM    AGRATIO 1.0 12/11/2019 03:14 PM    LABGLOM 51 12/06/2024 01:22 PM    LABGLOM 44 03/15/2024 01:32 PM    GLUCOSE 89 12/06/2024 01:22 PM    CALCIUM 9.3 12/06/2024 01:22 PM    BILITOT 0.6 10/02/2024 02:30 PM    ALKPHOS

## 2024-12-20 NOTE — ANESTHESIA POSTPROCEDURE EVALUATION
Department of Anesthesiology  Postprocedure Note    Patient: Sylvia Agulia  MRN: [id number]  YOB: 1955  Date of evaluation: 12/20/2024    Procedure Summary       Date: 12/20/24 Room / Location: [facility] 2 ALL EVENTS / [facility] CARDIAC CATH LAB    Anesthesia Start: 1053 Anesthesia Stop: 1149    Procedure: Remove & replace ICD biv multi leads Diagnosis:       ICD (implantable cardioverter-defibrillator) battery depletion      (ICD (implantable cardioverter-defibrillator) battery depletion [Z45.02])    Providers: Chandler Oro MD Responsible Provider: Dominic Britt MD    Anesthesia Type: TIVA ASA Status: 3            Anesthesia Type: No value filed.    Vini Phase I: Vini Score: 9    Vini Phase II:      Anesthesia Post Evaluation    Patient location during evaluation: PACU  Patient participation: complete - patient participated  Level of consciousness: awake and alert  Airway patency: patent  Nausea & Vomiting: no nausea and no vomiting  Cardiovascular status: hemodynamically stable  Respiratory status: acceptable, nonlabored ventilation and spontaneous ventilation  Hydration status: euvolemic  Comments: BP 91/71   Pulse 55   Temp 98 °F (36.7 °C)   Resp 17   Ht 1.753 m (5' 9\")   Wt 90.7 kg (200 lb)   SpO2 93%   BMI 29.53 kg/m²     Multimodal analgesia pain management approach  Pain management: adequate and satisfactory to patient    No notable events documented.

## 2024-12-20 NOTE — DISCHARGE INSTRUCTIONS
Post Op Device Instructions        Incision & Dressing Care   Please keep Aquacel dressing on wound until your 2 week follow up in device clinic. The dressing promotes healing and is meant to stay on the wound. Keep incision site completely dry for 48 hours. During this time you may take a sponge bath, but no shower. After 48 hours you may shower with your back to the water letting the water run over the dressing. Do not let the water directly hit the dressing, it is water resistant no water proof. Do not use any lotions, creams, or ointments on the incision.    Avoid manipulating the device or leads with your fingers. Do not massage or excessively rub the implant site. This could displace the leads      For four weeks after your implant   Do not lift anything heavier than a gallon of milk.   Do not raise your elbow above the level of your shoulder on the Left shoulder implant side.   Avoid excessive pushing, pulling, or twisting.     Call you doctor for any of the following:   Any signs of infection, including redness, swelling or pain at the incision site, or a   temperature of 101° F or greater.   If you have twitching chest muscles, hiccups that won't stop, or a swollen arm on the   side of the incision.   Any feelings of light-headedness, chest pain, or shortness of breath.   Please notify your doctors and dentists that you have an ICD.       You should not drive until 1 week after your implant or after your first follow-up appointment, whichever comes first. At that time, you can discuss when you may return to your regular driving routine.       About 1 month after implant, you will receive a card by mail from the company who manufactured your ICD. Your device was manufactured by [employer]. You should carry this card with you at all times.            If you receive one shock from your device:   and you feel ok, you may call to let your physician know.   but if you are feeling poorly, please notify your  doctor. You may need to be seen.   If you receive more than one shock in a 24 hour period, call your physician to schedule a visit or report to the emergency room.       Please call the Device Clinic at  [phone] if you have questions or concerns about your device. Please call the hospital if it is after 5 pm or the weekend please page the on call cardiologist at [facility] at [phone]         Follow Up Appointments  You will need to have your device checked 1- 2 weeks after the procedure and should have an appointment with the device clinic. If you do not hear from them call the device clinic.      Sedation for a Medical Procedure: Care Instructions     You were given a sedative medication during your visit. While many of the effects will have worn   off before you leave; you may continue to feel some effects for several hours.      Common side effects from sedation include:  Feeling sleepy. (Your doctors and nurses will make sure you are not too sleepy to go home.)  Nausea and vomiting. This usually does not last long.  Feeling tired.     How can you care for yourself at home?  Activity    Don't do anything for 24 hours that requires attention to detail. It takes time for the medicine effects to completely wear off.     Do not make important legal decisions for 24 hours.     Do not sign any legal documents for 24 hours.     Do not drink alcohol today     For your safety, you should not drive or operate heavy machinery for the remainder of the day     Rest when you feel tired. Getting enough sleep will help you recover.      Diet    You can eat your normal diet, unless your doctor gives you other instructions. If your stomach is upset, try clear liquids and bland, low-fat foods like plain toast or rice.     Drink plenty of fluids (unless your doctor tells you not to).     Don't drink alcohol for 24 hours.      Medicines    Be safe with medicines. Read and follow all instructions on the label.  If

## 2024-12-20 NOTE — PROGRESS NOTES
Patient received to CPRU room # 9  Ambulatory from [facility]. Patient scheduled for gen change today with Dr Oro. Procedure reviewed & questions answered, voiced good understanding consent obtained & placed on chart. All medications and medical history reviewed. Will prep patient per orders. Patient & family updated on plan of care.      The patient has a fraility score of 3-MANAGING WELL, based on patient A&Ox3, patient able to ambulate to room without difficulty.

## 2024-12-20 NOTE — PROGRESS NOTES
TRANSFER - IN REPORT:    Verbal report received from Wong RN on Sylvia Aguila  being received from EP lab for routine progression of patient care      Report consisted of patient's Situation, Background, Assessment and   Recommendations(SBAR).     Information from the following report(s) Nurse Handoff Report was reviewed with the receiving nurse.    Opportunity for questions and clarification was provided.      Assessment completed upon patient's arrival to unit and care assumed.

## 2024-12-23 NOTE — TELEPHONE ENCOUNTER
Requested Prescriptions     Pending Prescriptions Disp Refills    apixaban (ELIQUIS) 5 MG TABS tablet 60 tablet 5     Sig: Take 1 tablet by mouth 2 times daily

## 2024-12-23 NOTE — TELEPHONE ENCOUNTER
MEDICATION REFILL REQUEST      Name of Medication:  eliquis   Dose:  5 mg   Frequency:  twice a day   Quantity:    Days' supply:  30 day       Pharmacy Name/Location:  [facility] in [place]

## 2025-01-03 ENCOUNTER — NURSE ONLY (OUTPATIENT)
Age: 70
End: 2025-01-03

## 2025-01-03 DIAGNOSIS — Z95.810 BIVENTRICULAR AUTOMATIC IMPLANTABLE CARDIOVERTER DEFIBRILLATOR IN SITU: Primary | ICD-10-CM

## 2025-02-05 ENCOUNTER — PREP FOR PROCEDURE (OUTPATIENT)
Dept: UROGYNECOLOGY | Age: 70
End: 2025-02-05

## 2025-02-05 ENCOUNTER — TELEPHONE (OUTPATIENT)
Age: 70
End: 2025-02-05

## 2025-02-05 DIAGNOSIS — N39.3 FEMALE STRESS INCONTINENCE: ICD-10-CM

## 2025-02-05 NOTE — TELEPHONE ENCOUNTER
Nikunj Dacosta MD   to Breana Malin MA       2/4/25  2:48 PM  Note     Ok to hold eliquis 3 days          Faxed.

## 2025-02-05 NOTE — TELEPHONE ENCOUNTER
Cardiac Clearance        Physician or Practice Requesting: Dr. Sanam Reid   :   Contact Phone Number: [phone]  Fax Number: [phone]  Date of Surgery/Procedure: 3/11/2025  Type of Surgery or Procedure: Laparoscopitc total hystercetomy with removal of both fallopian tubes and ovaries, prolapse repair   Type of Anesthesia:not sure   Type of Clearance Requested: Cardiac Clearance and Medication Hold  Medication to Hold:Eliquis   Days to Hold: Not sure

## 2025-02-06 PROBLEM — N39.3 FEMALE STRESS INCONTINENCE: Status: ACTIVE | Noted: 2025-02-05

## 2025-03-04 DIAGNOSIS — D05.10 DUCTAL CARCINOMA IN SITU (DCIS) OF BREAST, UNSPECIFIED LATERALITY: Primary | ICD-10-CM

## 2025-03-04 DIAGNOSIS — E55.9 VITAMIN D DEFICIENCY: ICD-10-CM

## 2025-03-04 NOTE — PROGRESS NOTES
[facility] Hematology and Oncology: Established patient - follow up     Chief Complaint   Patient presents with    Follow-up     Reason for Referral: Breast cancer  Referring Provider:  Brigida Vincent MD  Primary Care Provider: Any Alvarez MD  Family History of Cancer/Hematologic Disorders: Family history is significant for mother with pancreatic cancer.   Presenting Symptoms: Abnormal routine screening mammogram     History of Present Illness:  Ms. Aguila is a 70 y.o. female who presents today for follow up regarding breast cancer.  The past medical history is significant for atrial fibrillation, CAD, CHF, DM2, HLD, SHIRLEY, severe cardiomyopathy, ICD, chronic anticoagulation with Eliquis, insomnia, partial thyroidectomy, left heart cath, cardioversion, and ADH s/p right breast excisional biopsy on 8/9/2018.  She initially presented to [facility] for routine bilateral screening mammogram on 6/30/22 which identified a series of nodules with possible associated spiculation at the 9:00 position in the right breast.  Further evaluation with diagnostic digital mammogram and right breast ultrasound were completed on 7/28/22 confirming suspicious focal irregular nodular densities with distortion extending 8 to 10 cm from the nipple on the 9 -10:00 position of the right breast, together measuring approximately 3 cm in AP diameter.  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  Patient was unable to have MRI due to her ICD device.  She was referred to surgery and evaluated in consultation by Surgeon, Dr. John Vaz, on 8/31/22.  She was assessed with signs and symptoms of right breast DCIS, papillary pattern, stage 0 and recommended for tag localization lumpectomy. Genetic testing with [facility] diagnostic testing was negative on 9/2/22.  Patient was seen by Radiation Oncology to discuss

## 2025-03-05 ENCOUNTER — ANESTHESIA EVENT (OUTPATIENT)
Dept: SURGERY | Age: 70
End: 2025-03-05
Payer: MEDICARE

## 2025-03-05 NOTE — PERIOP NOTE
Patient verified name and .  Order for consent  was not found in EHR; patient verifies procedure.   Type 3 surgery, PHONE assessment complete.  Orders NOT received.  Labs per surgeon: No orders received.   Labs per anesthesia protocol: CBC, BMP needed--pt scheduled to have CMP and CMP completed on 3/10/25 at MD office--charge nurse will f/u and review results.     \"[employer] ICD\" added to case posting.     Echo dated 24 (located in EHR) to be reviewed by anesthesia due to EF of 35-40%.  Pacemaker interrogation (1/3/25), EKG (24), and [facility] Cardiology office note (24) located in EHR.  [facility] Cardiology office note (10/8/24) located in Care Everywhere.     Patient answered medical/surgical history questions at their best of ability. All prior to admission medications documented in EPIC.    Patient instructed to continue taking all prescription medications up to the day of surgery but to take only the following medications the day of surgery according to anesthesia guidelines with a small sip of water: Metoprolol, Sotalol, Entresto, Levothyroxine, Omeprazole.     Patient informed that all vitamins and supplements should be held 7 days prior to surgery and NSAIDS 5 days prior to surgery. Prescription meds to hold: Follow surgeon's instructions regarding Eliquis. Hold Jardiance 3 days prior to surgery.     Patient instructed on the following:    > Arrive at [facility] A Entrance, time of arrival to be called the day before by 1700  > No food after midnight, patient may drink clear liquids up until 2 hours prior to arrival. No gum, candy, mints.   > Responsible adult must drive patient to the hospital, stay during surgery, and patient will need supervision 24 hours after anesthesia  > Use Hibiclens in shower the night before surgery and on the morning of surgery  > All piercings must be removed prior to arrival.    > Leave all valuables (money and jewelry) at home but bring insurance card and ID

## 2025-03-06 RX ORDER — SACUBITRIL AND VALSARTAN 24; 26 MG/1; MG/1
1 TABLET, FILM COATED ORAL 2 TIMES DAILY
Qty: 60 TABLET | Refills: 11 | Status: SHIPPED | OUTPATIENT
Start: 2025-03-06

## 2025-03-06 NOTE — TELEPHONE ENCOUNTER
Requested Prescriptions     Pending Prescriptions Disp Refills    sacubitril-valsartan (ENTRESTO) 24-26 MG per tablet 60 tablet 11     Sig: Take 1 tablet by mouth 2 times daily

## 2025-03-06 NOTE — TELEPHONE ENCOUNTER
MEDICATION REFILL REQUEST      Name of Medication:  Entresto  Dose:  24-26 mg  Frequency:  BID  Quantity:  60  Days' supply:  30 with 11 refills      [phone]

## 2025-03-07 RX ORDER — SODIUM CHLORIDE 0.9 % (FLUSH) 0.9 %
5-40 SYRINGE (ML) INJECTION PRN
Status: CANCELLED | OUTPATIENT
Start: 2025-03-07

## 2025-03-07 RX ORDER — SODIUM CHLORIDE 0.9 % (FLUSH) 0.9 %
5-40 SYRINGE (ML) INJECTION EVERY 12 HOURS SCHEDULED
Status: CANCELLED | OUTPATIENT
Start: 2025-03-07

## 2025-03-07 RX ORDER — SODIUM CHLORIDE 9 MG/ML
INJECTION, SOLUTION INTRAVENOUS PRN
Status: CANCELLED | OUTPATIENT
Start: 2025-03-07

## 2025-03-07 NOTE — H&P
History and Physical    Patient: Sylvia Aguila MRN: [id number]  SSN: [id number]   YOB: 1955  Age: 70 y.o.  Sex: female       Chief Complaint:  Pelvic Organ Prolapse    History of Present Illness:  Sylvia Aguila is a 70 y.o. female with POP. Ms. Sylvia Aguila has been experiencing prolapse for 4 months ago. The prolapse does not cause her pain and/or pressure. She does not have to splint to complete urination, a BM, or for comfort. Nothing makes her prolapse symptoms worse. Nothing makes her prolapse symptoms better. She has not tried a pessary, she has not tried physical therapy, she has not  had surgery for her POP.     Patient denies urinary and fecal incontinences    She voids 6-8 times during the day.  She voids 1 times over night.  She has 7-14 BM per week, and does not strain.    She drinks 1 caffeine drinks beverages per day. 1 cup of coffee in AM   She uses 0 artificial sweeteners per day.  She drinks 0 alcoholic beverages per week.     She has not pelvic surgery in the past.   Her last PAP:   2024  Her last Colonoscopy: Due  Her last Mammogram: 2024    She does not have a history of DM.   She does have a history of sleep apnea.     Tobacco: No    Sexual History: not sexually active.      Allergies:  No Known Allergies      Medications:  No current facility-administered medications for this encounter.    Current Outpatient Medications:     apixaban (ELIQUIS) 5 MG TABS tablet, Take 1 tablet by mouth 2 times daily, Disp: 60 tablet, Rfl: 5    sotalol (BETAPACE) 120 MG tablet, TAKE 1 TABLET BY MOUTH EVERY MORNING TAKE 1 TABLET BY MOUTH EVERY EVENING, Disp: 180 tablet, Rfl: 3    letrozole (FEMARA) 2.5 MG tablet, Take 1 tablet by mouth daily (Patient taking differently: Take 1 tablet by mouth at bedtime), Disp: 90 tablet, Rfl: 3    atorvastatin (LIPITOR) 20 MG tablet, TAKE 1 TABLET BY MOUTH EVERY DAY FOR CHOLESTEROL (Patient taking differently: Take 1 tablet by mouth at

## 2025-03-10 ENCOUNTER — OFFICE VISIT (OUTPATIENT)
Dept: ONCOLOGY | Age: 70
End: 2025-03-10
Payer: MEDICARE

## 2025-03-10 ENCOUNTER — HOSPITAL ENCOUNTER (OUTPATIENT)
Dept: LAB | Age: 70
Discharge: HOME | End: 2025-03-10
Payer: MEDICARE

## 2025-03-10 VITALS
RESPIRATION RATE: 16 BRPM | BODY MASS INDEX: 30.66 KG/M2 | OXYGEN SATURATION: 97 % | TEMPERATURE: 98.4 F | SYSTOLIC BLOOD PRESSURE: 101 MMHG | HEART RATE: 60 BPM | WEIGHT: 207 LBS | DIASTOLIC BLOOD PRESSURE: 60 MMHG | HEIGHT: 69 IN

## 2025-03-10 DIAGNOSIS — E55.9 VITAMIN D DEFICIENCY: ICD-10-CM

## 2025-03-10 DIAGNOSIS — Z79.811 AROMATASE INHIBITOR USE: ICD-10-CM

## 2025-03-10 DIAGNOSIS — Z91.89 AT RISK FOR BONE DENSITY LOSS: ICD-10-CM

## 2025-03-10 DIAGNOSIS — D05.10 DUCTAL CARCINOMA IN SITU (DCIS) OF BREAST, UNSPECIFIED LATERALITY: Primary | ICD-10-CM

## 2025-03-10 DIAGNOSIS — D05.10 DUCTAL CARCINOMA IN SITU (DCIS) OF BREAST, UNSPECIFIED LATERALITY: ICD-10-CM

## 2025-03-10 DIAGNOSIS — Z12.31 BREAST CANCER SCREENING BY MAMMOGRAM: ICD-10-CM

## 2025-03-10 LAB
25(OH)D3 SERPL-MCNC: 28.2 NG/ML (ref 30–100)
ALBUMIN SERPL-MCNC: 4 G/DL (ref 3.2–4.6)
ALBUMIN/GLOB SERPL: 1.1 (ref 1–1.9)
ALP SERPL-CCNC: 142 U/L (ref 35–104)
ALT SERPL-CCNC: 11 U/L (ref 8–45)
ANION GAP SERPL CALC-SCNC: 9 MMOL/L (ref 7–16)
AST SERPL-CCNC: 22 U/L (ref 15–37)
BASOPHILS # BLD: 0.03 K/UL (ref 0–0.2)
BASOPHILS NFR BLD: 0.4 % (ref 0–2)
BILIRUB SERPL-MCNC: 0.8 MG/DL (ref 0–1.2)
BUN SERPL-MCNC: 16 MG/DL (ref 8–23)
CALCIUM SERPL-MCNC: 9.7 MG/DL (ref 8.8–10.2)
CHLORIDE SERPL-SCNC: 102 MMOL/L (ref 98–107)
CO2 SERPL-SCNC: 28 MMOL/L (ref 20–29)
CREAT SERPL-MCNC: 1.14 MG/DL (ref 0.6–1.1)
DIFFERENTIAL METHOD BLD: NORMAL
EOSINOPHIL # BLD: 0.13 K/UL (ref 0–0.8)
EOSINOPHIL NFR BLD: 1.8 % (ref 0.5–7.8)
ERYTHROCYTE [DISTWIDTH] IN BLOOD BY AUTOMATED COUNT: 13.3 % (ref 11.9–14.6)
GLOBULIN SER CALC-MCNC: 3.5 G/DL (ref 2.3–3.5)
GLUCOSE SERPL-MCNC: 104 MG/DL (ref 70–99)
HCT VFR BLD AUTO: 46.1 % (ref 35.8–46.3)
HGB BLD-MCNC: 14.8 G/DL (ref 11.7–15.4)
IMM GRANULOCYTES # BLD AUTO: 0.02 K/UL (ref 0–0.5)
IMM GRANULOCYTES NFR BLD AUTO: 0.3 % (ref 0–5)
LYMPHOCYTES # BLD: 2.17 K/UL (ref 0.5–4.6)
LYMPHOCYTES NFR BLD: 29.3 % (ref 13–44)
MCH RBC QN AUTO: 30.2 PG (ref 26.1–32.9)
MCHC RBC AUTO-ENTMCNC: 32.1 G/DL (ref 31.4–35)
MCV RBC AUTO: 94.1 FL (ref 82–102)
MONOCYTES # BLD: 0.45 K/UL (ref 0.1–1.3)
MONOCYTES NFR BLD: 6.1 % (ref 4–12)
NEUTS SEG # BLD: 4.6 K/UL (ref 1.7–8.2)
NEUTS SEG NFR BLD: 62.1 % (ref 43–78)
NRBC # BLD: 0 K/UL (ref 0–0.2)
PLATELET # BLD AUTO: 222 K/UL (ref 150–450)
PMV BLD AUTO: 9.6 FL (ref 9.4–12.3)
POTASSIUM SERPL-SCNC: 4.5 MMOL/L (ref 3.5–5.1)
PROT SERPL-MCNC: 7.5 G/DL (ref 6.3–8.2)
RBC # BLD AUTO: 4.9 M/UL (ref 4.05–5.2)
SODIUM SERPL-SCNC: 139 MMOL/L (ref 136–145)
WBC # BLD AUTO: 7.4 K/UL (ref 4.3–11.1)

## 2025-03-10 PROCEDURE — 1090F PRES/ABSN URINE INCON ASSESS: CPT | Performed by: INTERNAL MEDICINE

## 2025-03-10 PROCEDURE — 80053 COMPREHEN METABOLIC PANEL: CPT

## 2025-03-10 PROCEDURE — 36415 COLL VENOUS BLD VENIPUNCTURE: CPT

## 2025-03-10 PROCEDURE — G8417 CALC BMI ABV UP PARAM F/U: HCPCS | Performed by: INTERNAL MEDICINE

## 2025-03-10 PROCEDURE — 1036F TOBACCO NON-USER: CPT | Performed by: INTERNAL MEDICINE

## 2025-03-10 PROCEDURE — G8399 PT W/DXA RESULTS DOCUMENT: HCPCS | Performed by: INTERNAL MEDICINE

## 2025-03-10 PROCEDURE — 1123F ACP DISCUSS/DSCN MKR DOCD: CPT | Performed by: INTERNAL MEDICINE

## 2025-03-10 PROCEDURE — G8427 DOCREV CUR MEDS BY ELIG CLIN: HCPCS | Performed by: INTERNAL MEDICINE

## 2025-03-10 PROCEDURE — 85025 COMPLETE CBC W/AUTO DIFF WBC: CPT

## 2025-03-10 PROCEDURE — 1126F AMNT PAIN NOTED NONE PRSNT: CPT | Performed by: INTERNAL MEDICINE

## 2025-03-10 PROCEDURE — 3017F COLORECTAL CA SCREEN DOC REV: CPT | Performed by: INTERNAL MEDICINE

## 2025-03-10 PROCEDURE — 82306 VITAMIN D 25 HYDROXY: CPT

## 2025-03-10 PROCEDURE — 99213 OFFICE O/P EST LOW 20 MIN: CPT | Performed by: INTERNAL MEDICINE

## 2025-03-10 RX ORDER — SODIUM CHLORIDE 0.9 % (FLUSH) 0.9 %
5-40 SYRINGE (ML) INJECTION PRN
Status: CANCELLED | OUTPATIENT
Start: 2025-03-10

## 2025-03-10 RX ORDER — SODIUM CHLORIDE 9 MG/ML
INJECTION, SOLUTION INTRAVENOUS PRN
Status: CANCELLED | OUTPATIENT
Start: 2025-03-10

## 2025-03-10 RX ORDER — MIDAZOLAM HYDROCHLORIDE 2 MG/2ML
2 INJECTION, SOLUTION INTRAMUSCULAR; INTRAVENOUS
Status: CANCELLED | OUTPATIENT
Start: 2025-03-10 | End: 2025-03-11

## 2025-03-10 RX ORDER — SODIUM CHLORIDE 0.9 % (FLUSH) 0.9 %
5-40 SYRINGE (ML) INJECTION EVERY 12 HOURS SCHEDULED
Status: CANCELLED | OUTPATIENT
Start: 2025-03-10

## 2025-03-10 RX ORDER — FENTANYL CITRATE 50 UG/ML
100 INJECTION, SOLUTION INTRAMUSCULAR; INTRAVENOUS
Refills: 0 | Status: CANCELLED | OUTPATIENT
Start: 2025-03-10 | End: 2025-03-11

## 2025-03-10 ASSESSMENT — PATIENT HEALTH QUESTIONNAIRE - PHQ9
1. LITTLE INTEREST OR PLEASURE IN DOING THINGS: NOT AT ALL
SUM OF ALL RESPONSES TO PHQ QUESTIONS 1-9: 0
2. FEELING DOWN, DEPRESSED OR HOPELESS: NOT AT ALL

## 2025-03-10 NOTE — PATIENT INSTRUCTIONS
Patient Information from Today's Visit    The members of your [facility] are listed below:    Physician Provider: Jacy Dorado, Medical Oncologist  Registered Nurse: Marge BRAR RN  Navigator: Elsi TANG RN or Valeria MORALES RN  Medical Assistant: Genesis LEE MA  : Marge ZABALA   Supportive Care Services: Natalia KOHLI LMSW    Diagnosis: DCIS      Follow Up Instructions: 4-5 months.    Labs reviewed.  Symptoms reviewed.  DEXA bone density scan due in May.  You can call to schedule this at [phone].  Mammogram due in September.  You can call to schedule this at [phone].    Treatment Summary has been discussed and given to patient:N/A      Current Labs:   Hospital Outpatient Visit on 03/10/2025   Component Date Value Ref Range Status    WBC 03/10/2025 7.4  4.3 - 11.1 K/uL Final    RBC 03/10/2025 4.90  4.05 - 5.2 M/uL Final    Hemoglobin 03/10/2025 14.8  11.7 - 15.4 g/dL Final    Hematocrit 03/10/2025 46.1  35.8 - 46.3 % Final    MCV 03/10/2025 94.1  82.0 - 102.0 FL Final    MCH 03/10/2025 30.2  26.1 - 32.9 PG Final    MCHC 03/10/2025 32.1  31.4 - 35.0 g/dL Final    RDW 03/10/2025 13.3  11.9 - 14.6 % Final    Platelets 03/10/2025 222  150 - 450 K/uL Final    MPV 03/10/2025 9.6  9.4 - 12.3 FL Final    nRBC 03/10/2025 0.00  0.0 - 0.2 K/uL Final    **Note: Absolute NRBC parameter is now reported with Hemogram**    Neutrophils % 03/10/2025 62.1  43.0 - 78.0 % Final    Lymphocytes % 03/10/2025 29.3  13.0 - 44.0 % Final    Monocytes % 03/10/2025 6.1  4.0 - 12.0 % Final    Eosinophils % 03/10/2025 1.8  0.5 - 7.8 % Final    Basophils % 03/10/2025 0.4  0.0 - 2.0 % Final    Immature Granulocytes % 03/10/2025 0.3  0.0 - 5.0 % Final    Neutrophils Absolute 03/10/2025 4.60  1.70 - 8.20 K/UL Final    Lymphocytes Absolute 03/10/2025 2.17  0.50 - 4.60 K/UL Final    Monocytes Absolute 03/10/2025 0.45  0.10 - 1.30 K/UL Final    Eosinophils Absolute 03/10/2025 0.13  0.00 - 0.80 K/UL Final    Basophils Absolute

## 2025-03-11 ENCOUNTER — HOSPITAL ENCOUNTER (OUTPATIENT)
Age: 70
Setting detail: OUTPATIENT SURGERY
Discharge: HOME | End: 2025-03-11
Attending: OBSTETRICS & GYNECOLOGY | Admitting: OBSTETRICS & GYNECOLOGY
Payer: MEDICARE

## 2025-03-11 ENCOUNTER — RESULTS FOLLOW-UP (OUTPATIENT)
Dept: LAB | Age: 70
End: 2025-03-11

## 2025-03-11 ENCOUNTER — ANESTHESIA (OUTPATIENT)
Dept: SURGERY | Age: 70
End: 2025-03-11
Payer: MEDICARE

## 2025-03-11 VITALS
TEMPERATURE: 97.4 F | SYSTOLIC BLOOD PRESSURE: 115 MMHG | RESPIRATION RATE: 16 BRPM | WEIGHT: 207.8 LBS | DIASTOLIC BLOOD PRESSURE: 66 MMHG | BODY MASS INDEX: 30.78 KG/M2 | OXYGEN SATURATION: 99 % | HEART RATE: 66 BPM | HEIGHT: 69 IN

## 2025-03-11 DIAGNOSIS — N81.3 UTEROVAGINAL PROLAPSE, COMPLETE: ICD-10-CM

## 2025-03-11 DIAGNOSIS — G89.18 POST-OP PAIN: Primary | ICD-10-CM

## 2025-03-11 DIAGNOSIS — N39.3 FEMALE STRESS INCONTINENCE: ICD-10-CM

## 2025-03-11 LAB
ABO + RH BLD: NORMAL
BLOOD GROUP ANTIBODIES SERPL: NORMAL
SPECIMEN EXP DATE BLD: NORMAL

## 2025-03-11 PROCEDURE — 6360000002 HC RX W HCPCS: Performed by: OBSTETRICS & GYNECOLOGY

## 2025-03-11 PROCEDURE — C1771 REP DEV, URINARY, W/SLING: HCPCS | Performed by: OBSTETRICS & GYNECOLOGY

## 2025-03-11 PROCEDURE — 7100000001 HC PACU RECOVERY - ADDTL 15 MIN: Performed by: OBSTETRICS & GYNECOLOGY

## 2025-03-11 PROCEDURE — 86850 RBC ANTIBODY SCREEN: CPT

## 2025-03-11 PROCEDURE — 7100000011 HC PHASE II RECOVERY - ADDTL 15 MIN: Performed by: OBSTETRICS & GYNECOLOGY

## 2025-03-11 PROCEDURE — 88307 TISSUE EXAM BY PATHOLOGIST: CPT

## 2025-03-11 PROCEDURE — 3600000019 HC SURGERY ROBOT ADDTL 15MIN: Performed by: OBSTETRICS & GYNECOLOGY

## 2025-03-11 PROCEDURE — 2500000003 HC RX 250 WO HCPCS: Performed by: OBSTETRICS & GYNECOLOGY

## 2025-03-11 PROCEDURE — 2709999900 HC NON-CHARGEABLE SUPPLY: Performed by: OBSTETRICS & GYNECOLOGY

## 2025-03-11 PROCEDURE — 7100000010 HC PHASE II RECOVERY - FIRST 15 MIN: Performed by: OBSTETRICS & GYNECOLOGY

## 2025-03-11 PROCEDURE — 6360000002 HC RX W HCPCS: Performed by: STUDENT IN AN ORGANIZED HEALTH CARE EDUCATION/TRAINING PROGRAM

## 2025-03-11 PROCEDURE — 6360000002 HC RX W HCPCS: Performed by: NURSE ANESTHETIST, CERTIFIED REGISTERED

## 2025-03-11 PROCEDURE — S2900 ROBOTIC SURGICAL SYSTEM: HCPCS | Performed by: OBSTETRICS & GYNECOLOGY

## 2025-03-11 PROCEDURE — 3700000000 HC ANESTHESIA ATTENDED CARE: Performed by: OBSTETRICS & GYNECOLOGY

## 2025-03-11 PROCEDURE — 6370000000 HC RX 637 (ALT 250 FOR IP): Performed by: OBSTETRICS & GYNECOLOGY

## 2025-03-11 PROCEDURE — 7100000000 HC PACU RECOVERY - FIRST 15 MIN: Performed by: OBSTETRICS & GYNECOLOGY

## 2025-03-11 PROCEDURE — C1781 MESH (IMPLANTABLE): HCPCS | Performed by: OBSTETRICS & GYNECOLOGY

## 2025-03-11 PROCEDURE — 2580000003 HC RX 258: Performed by: STUDENT IN AN ORGANIZED HEALTH CARE EDUCATION/TRAINING PROGRAM

## 2025-03-11 PROCEDURE — 3600000009 HC SURGERY ROBOT BASE: Performed by: OBSTETRICS & GYNECOLOGY

## 2025-03-11 PROCEDURE — 86900 BLOOD TYPING SEROLOGIC ABO: CPT

## 2025-03-11 PROCEDURE — 2500000003 HC RX 250 WO HCPCS: Performed by: NURSE ANESTHETIST, CERTIFIED REGISTERED

## 2025-03-11 PROCEDURE — 2720000010 HC SURG SUPPLY STERILE: Performed by: OBSTETRICS & GYNECOLOGY

## 2025-03-11 PROCEDURE — 86901 BLOOD TYPING SEROLOGIC RH(D): CPT

## 2025-03-11 PROCEDURE — 3700000001 HC ADD 15 MINUTES (ANESTHESIA): Performed by: OBSTETRICS & GYNECOLOGY

## 2025-03-11 DEVICE — TRANSOBTURATOR SLING SYSTEM WITH PRECISIONBLUE™ DESIGN
Type: IMPLANTABLE DEVICE | Site: BLADDER | Status: FUNCTIONAL
Brand: OBTRYX™ II SYSTEM - CURVED

## 2025-03-11 DEVICE — POLYPROPYLENE MESH FOR SACROCOLPOSUSPENSION/SACROCOLPOPEXY - Y CONTOUR™
Type: IMPLANTABLE DEVICE | Site: VAGINA | Status: FUNCTIONAL
Brand: RESTORELLE

## 2025-03-11 RX ORDER — PROCHLORPERAZINE EDISYLATE 5 MG/ML
5 INJECTION INTRAMUSCULAR; INTRAVENOUS
Status: COMPLETED | OUTPATIENT
Start: 2025-03-11 | End: 2025-03-11

## 2025-03-11 RX ORDER — LIDOCAINE HYDROCHLORIDE AND EPINEPHRINE 10; 10 MG/ML; UG/ML
INJECTION, SOLUTION INFILTRATION; PERINEURAL PRN
Status: DISCONTINUED | OUTPATIENT
Start: 2025-03-11 | End: 2025-03-11 | Stop reason: ALTCHOICE

## 2025-03-11 RX ORDER — POLYETHYLENE GLYCOL 3350 17 G/17G
17 POWDER, FOR SOLUTION ORAL DAILY
Qty: 578 G | Refills: 0 | Status: SHIPPED | OUTPATIENT
Start: 2025-03-11 | End: 2025-04-10

## 2025-03-11 RX ORDER — SODIUM CHLORIDE, SODIUM LACTATE, POTASSIUM CHLORIDE, CALCIUM CHLORIDE 600; 310; 30; 20 MG/100ML; MG/100ML; MG/100ML; MG/100ML
INJECTION, SOLUTION INTRAVENOUS CONTINUOUS
Status: DISCONTINUED | OUTPATIENT
Start: 2025-03-11 | End: 2025-03-11 | Stop reason: HOSPADM

## 2025-03-11 RX ORDER — ACETAMINOPHEN 500 MG
1000 TABLET ORAL EVERY 6 HOURS PRN
COMMUNITY

## 2025-03-11 RX ORDER — NALOXONE HYDROCHLORIDE 0.4 MG/ML
INJECTION, SOLUTION INTRAMUSCULAR; INTRAVENOUS; SUBCUTANEOUS PRN
Status: DISCONTINUED | OUTPATIENT
Start: 2025-03-11 | End: 2025-03-11 | Stop reason: HOSPADM

## 2025-03-11 RX ORDER — ONDANSETRON 2 MG/ML
4 INJECTION INTRAMUSCULAR; INTRAVENOUS
Status: COMPLETED | OUTPATIENT
Start: 2025-03-11 | End: 2025-03-11

## 2025-03-11 RX ORDER — FENTANYL CITRATE 50 UG/ML
INJECTION, SOLUTION INTRAMUSCULAR; INTRAVENOUS
Status: DISCONTINUED | OUTPATIENT
Start: 2025-03-11 | End: 2025-03-11 | Stop reason: SDUPTHER

## 2025-03-11 RX ORDER — PHENAZOPYRIDINE HYDROCHLORIDE 95 MG/1
95 TABLET ORAL ONCE
Status: COMPLETED | OUTPATIENT
Start: 2025-03-11 | End: 2025-03-11

## 2025-03-11 RX ORDER — KETAMINE HCL IN NACL, ISO-OSM 20 MG/2 ML
SYRINGE (ML) INJECTION
Status: DISCONTINUED | OUTPATIENT
Start: 2025-03-11 | End: 2025-03-11 | Stop reason: SDUPTHER

## 2025-03-11 RX ORDER — EPHEDRINE SULFATE/0.9% NACL/PF 50 MG/5 ML
SYRINGE (ML) INTRAVENOUS
Status: DISCONTINUED | OUTPATIENT
Start: 2025-03-11 | End: 2025-03-11 | Stop reason: SDUPTHER

## 2025-03-11 RX ORDER — PROPOFOL 10 MG/ML
INJECTION, EMULSION INTRAVENOUS
Status: DISCONTINUED | OUTPATIENT
Start: 2025-03-11 | End: 2025-03-11 | Stop reason: SDUPTHER

## 2025-03-11 RX ORDER — ETOMIDATE 2 MG/ML
INJECTION INTRAVENOUS
Status: DISCONTINUED | OUTPATIENT
Start: 2025-03-11 | End: 2025-03-11 | Stop reason: SDUPTHER

## 2025-03-11 RX ORDER — NEOSTIGMINE METHYLSULFATE 1 MG/ML
INJECTION INTRAVENOUS
Status: DISCONTINUED | OUTPATIENT
Start: 2025-03-11 | End: 2025-03-11 | Stop reason: SDUPTHER

## 2025-03-11 RX ORDER — DROPERIDOL 2.5 MG/ML
0.62 INJECTION, SOLUTION INTRAMUSCULAR; INTRAVENOUS ONCE
Status: COMPLETED | OUTPATIENT
Start: 2025-03-11 | End: 2025-03-11

## 2025-03-11 RX ORDER — ONDANSETRON 4 MG/1
4 TABLET, ORALLY DISINTEGRATING ORAL 3 TIMES DAILY PRN
Qty: 10 TABLET | Refills: 0 | Status: SHIPPED | OUTPATIENT
Start: 2025-03-11

## 2025-03-11 RX ORDER — OXYCODONE HYDROCHLORIDE 5 MG/1
5 TABLET ORAL
Status: DISCONTINUED | OUTPATIENT
Start: 2025-03-11 | End: 2025-03-11 | Stop reason: HOSPADM

## 2025-03-11 RX ORDER — ACETAMINOPHEN 500 MG
1000 TABLET ORAL ONCE
Status: COMPLETED | OUTPATIENT
Start: 2025-03-11 | End: 2025-03-11

## 2025-03-11 RX ORDER — ONDANSETRON 2 MG/ML
INJECTION INTRAMUSCULAR; INTRAVENOUS
Status: DISCONTINUED | OUTPATIENT
Start: 2025-03-11 | End: 2025-03-11 | Stop reason: SDUPTHER

## 2025-03-11 RX ORDER — ROCURONIUM BROMIDE 10 MG/ML
INJECTION, SOLUTION INTRAVENOUS
Status: DISCONTINUED | OUTPATIENT
Start: 2025-03-11 | End: 2025-03-11 | Stop reason: SDUPTHER

## 2025-03-11 RX ORDER — OXYCODONE HYDROCHLORIDE 5 MG/1
5 TABLET ORAL EVERY 6 HOURS PRN
Qty: 10 TABLET | Refills: 0 | Status: SHIPPED | OUTPATIENT
Start: 2025-03-11 | End: 2025-03-14

## 2025-03-11 RX ORDER — BUPIVACAINE HYDROCHLORIDE 2.5 MG/ML
INJECTION, SOLUTION EPIDURAL; INFILTRATION; INTRACAUDAL PRN
Status: DISCONTINUED | OUTPATIENT
Start: 2025-03-11 | End: 2025-03-11 | Stop reason: ALTCHOICE

## 2025-03-11 RX ORDER — LIDOCAINE HYDROCHLORIDE 10 MG/ML
1 INJECTION, SOLUTION INFILTRATION; PERINEURAL
Status: COMPLETED | OUTPATIENT
Start: 2025-03-11 | End: 2025-03-11

## 2025-03-11 RX ORDER — DOCUSATE SODIUM 100 MG/1
100 CAPSULE, LIQUID FILLED ORAL 2 TIMES DAILY
Qty: 60 CAPSULE | Refills: 0 | Status: SHIPPED | OUTPATIENT
Start: 2025-03-11 | End: 2025-04-10

## 2025-03-11 RX ORDER — GLYCOPYRROLATE 0.2 MG/ML
INJECTION INTRAMUSCULAR; INTRAVENOUS
Status: DISCONTINUED | OUTPATIENT
Start: 2025-03-11 | End: 2025-03-11 | Stop reason: SDUPTHER

## 2025-03-11 RX ORDER — DEXAMETHASONE SODIUM PHOSPHATE 10 MG/ML
INJECTION, SOLUTION INTRAMUSCULAR; INTRAVENOUS
Status: DISCONTINUED | OUTPATIENT
Start: 2025-03-11 | End: 2025-03-11 | Stop reason: SDUPTHER

## 2025-03-11 RX ORDER — LIDOCAINE HYDROCHLORIDE 20 MG/ML
INJECTION, SOLUTION EPIDURAL; INFILTRATION; INTRACAUDAL; PERINEURAL
Status: DISCONTINUED | OUTPATIENT
Start: 2025-03-11 | End: 2025-03-11 | Stop reason: SDUPTHER

## 2025-03-11 RX ADMIN — DROPERIDOL 0.62 MG: 2.5 INJECTION, SOLUTION INTRAMUSCULAR; INTRAVENOUS at 12:29

## 2025-03-11 RX ADMIN — MIDAZOLAM 2 MG: 1 INJECTION INTRAMUSCULAR; INTRAVENOUS at 07:05

## 2025-03-11 RX ADMIN — LIDOCAINE HYDROCHLORIDE 100 MG: 20 INJECTION, SOLUTION EPIDURAL; INFILTRATION; INTRACAUDAL; PERINEURAL at 07:19

## 2025-03-11 RX ADMIN — FENTANYL CITRATE 50 MCG: 50 INJECTION, SOLUTION INTRAMUSCULAR; INTRAVENOUS at 09:34

## 2025-03-11 RX ADMIN — GLYCOPYRROLATE 0.5 MG: 0.2 INJECTION INTRAMUSCULAR; INTRAVENOUS at 10:00

## 2025-03-11 RX ADMIN — Medication 10 MG: at 09:54

## 2025-03-11 RX ADMIN — ONDANSETRON 4 MG: 2 INJECTION, SOLUTION INTRAMUSCULAR; INTRAVENOUS at 10:46

## 2025-03-11 RX ADMIN — ROCURONIUM BROMIDE 10 MG: 10 INJECTION, SOLUTION INTRAVENOUS at 08:00

## 2025-03-11 RX ADMIN — FENTANYL CITRATE 25 MCG: 50 INJECTION, SOLUTION INTRAMUSCULAR; INTRAVENOUS at 07:19

## 2025-03-11 RX ADMIN — LIDOCAINE HYDROCHLORIDE 1 ML: 10 INJECTION, SOLUTION INFILTRATION; PERINEURAL at 06:30

## 2025-03-11 RX ADMIN — ETOMIDATE 10 MG: 2 INJECTION, SOLUTION INTRAVENOUS at 07:19

## 2025-03-11 RX ADMIN — ROCURONIUM BROMIDE 50 MG: 10 INJECTION, SOLUTION INTRAVENOUS at 07:20

## 2025-03-11 RX ADMIN — SODIUM CHLORIDE, SODIUM LACTATE, POTASSIUM CHLORIDE, AND CALCIUM CHLORIDE: 600; 310; 30; 20 INJECTION, SOLUTION INTRAVENOUS at 06:30

## 2025-03-11 RX ADMIN — PROCHLORPERAZINE EDISYLATE 5 MG: 5 INJECTION INTRAMUSCULAR; INTRAVENOUS at 10:59

## 2025-03-11 RX ADMIN — SODIUM CHLORIDE, SODIUM LACTATE, POTASSIUM CHLORIDE, AND CALCIUM CHLORIDE: 600; 310; 30; 20 INJECTION, SOLUTION INTRAVENOUS at 07:40

## 2025-03-11 RX ADMIN — URINARY PAIN RELIEF 95 MG: 95 TABLET ORAL at 06:08

## 2025-03-11 RX ADMIN — Medication 5 MG: at 07:19

## 2025-03-11 RX ADMIN — HYDROMORPHONE HYDROCHLORIDE 0.5 MG: 1 INJECTION, SOLUTION INTRAMUSCULAR; INTRAVENOUS; SUBCUTANEOUS at 10:39

## 2025-03-11 RX ADMIN — ACETAMINOPHEN 1000 MG: 500 TABLET, FILM COATED ORAL at 06:08

## 2025-03-11 RX ADMIN — Medication 10 MG: at 08:16

## 2025-03-11 RX ADMIN — PROPOFOL 70 MG: 10 INJECTION, EMULSION INTRAVENOUS at 07:19

## 2025-03-11 RX ADMIN — FENTANYL CITRATE 25 MCG: 50 INJECTION, SOLUTION INTRAMUSCULAR; INTRAVENOUS at 09:05

## 2025-03-11 RX ADMIN — DEXAMETHASONE SODIUM PHOSPHATE 8 MG: 10 INJECTION INTRAMUSCULAR; INTRAVENOUS at 08:11

## 2025-03-11 RX ADMIN — Medication 20 MG: at 07:39

## 2025-03-11 RX ADMIN — Medication 2 G: at 07:05

## 2025-03-11 RX ADMIN — GLYCOPYRROLATE 0.1 MG: 0.2 INJECTION INTRAMUSCULAR; INTRAVENOUS at 07:19

## 2025-03-11 RX ADMIN — ONDANSETRON 4 MG: 2 INJECTION, SOLUTION INTRAMUSCULAR; INTRAVENOUS at 09:20

## 2025-03-11 RX ADMIN — NEOSTIGMINE METHYLSULFATE 5 MG: 1 INJECTION INTRAVENOUS at 10:00

## 2025-03-11 ASSESSMENT — PAIN SCALES - GENERAL
PAINLEVEL_OUTOF10: 6
PAINLEVEL_OUTOF10: 7
PAINLEVEL_OUTOF10: 0
PAINLEVEL_OUTOF10: 8
PAINLEVEL_OUTOF10: 6

## 2025-03-11 ASSESSMENT — PAIN DESCRIPTION - DESCRIPTORS
DESCRIPTORS: ACHING;DISCOMFORT
DESCRIPTORS: PRESSURE

## 2025-03-11 ASSESSMENT — PAIN DESCRIPTION - PAIN TYPE
TYPE: SURGICAL PAIN
TYPE: SURGICAL PAIN

## 2025-03-11 ASSESSMENT — PAIN DESCRIPTION - LOCATION
LOCATION: ABDOMEN;INCISION;PERINEUM
LOCATION: ABDOMEN;INCISION

## 2025-03-11 ASSESSMENT — PAIN - FUNCTIONAL ASSESSMENT: PAIN_FUNCTIONAL_ASSESSMENT: 0-10

## 2025-03-11 NOTE — ANESTHESIA PRE PROCEDURE
Department of Anesthesiology  Preprocedure Note       Name:  Sylvia Aguila   Age:  70 y.o.  :  1955                                          MRN:  [id number]         Date:  3/11/2025      Surgeon: Surgeon(s):  Sanam Reid DO    Procedure: Procedure(s):  SACRAL COLPOPEXY ROBOTIC  HYSTERECTOMY ABDOMINAL LAPAROSCOPIC ROBOTIC  URETHRAL SLING INSERTION  [employer] ICD    Medications prior to admission:   Prior to Admission medications    Medication Sig Start Date End Date Taking? Authorizing Provider   acetaminophen (TYLENOL) 500 MG tablet Take 2 tablets by mouth every 6 hours as needed for Pain   Yes Reginald Flores MD   sacubitril-valsartan (ENTRESTO) 24-26 MG per tablet Take 1 tablet by mouth 2 times daily 3/6/25  Yes Nikunj Dacosta MD   apixaban (ELIQUIS) 5 MG TABS tablet Take 1 tablet by mouth 2 times daily 24  Yes Jeffy Stephens MD   sotalol (BETAPACE) 120 MG tablet TAKE 1 TABLET BY MOUTH EVERY MORNING TAKE 1 TABLET BY MOUTH EVERY EVENING 10/23/24  Yes Nikunj Dacosta MD   letrozole (FEMARA) 2.5 MG tablet Take 1 tablet by mouth daily 10/2/24  Yes NicolletJessica Mckenna APRN - CNP   atorvastatin (LIPITOR) 20 MG tablet TAKE 1 TABLET BY MOUTH EVERY DAY FOR CHOLESTEROL 24  Yes Nikunj Dacosta MD   spironolactone (ALDACTONE) 25 MG tablet Take 1 tablet by mouth daily 3/18/24  Yes Nikunj Dacosta MD   metoprolol succinate (TOPROL XL) 25 MG extended release tablet Take 1 tablet by mouth 2 times daily 3/18/24  Yes Nikunj Dacosta MD   JARDIANCE 10 MG tablet Take 1 tablet by mouth daily 22  Yes Reginald Flores MD   levothyroxine (SYNTHROID) 88 MCG tablet Take by mouth every morning (before breakfast)   Yes Automatic Reconciliation, Ar   omeprazole (PRILOSEC) 20 MG delayed release capsule Take 1 capsule by mouth daily   Yes Automatic Reconciliation, Ar   mometasone (ELOCON) 0.1 % lotion  24   ProviderReginald MD       Current medications:

## 2025-03-11 NOTE — PERIOP NOTE
At  1145 am, the patient was placed in lithotomy. The saba bag was removed from the indwelling catheter and 300 cc of sterile water was instilled into the bladder. The saba catheter was then removed. The patient was asked to void. She was able to void 0 cc at  0 pm.        The patient was discharged with a saba catheter.      Discharge instructions reviewed with pt and family member who verbalize understanding of follow up care.

## 2025-03-11 NOTE — OP NOTE
Procedure:  Robotic Assisted total laparoscopic hysterectomy  2. Bilateral salpingoopherectomy  3. Sacralcolpopexy   4. Midurethral sling (obtryx II)  5. Cystourethroscopy    Preoperative Diagnosis:   Anterior Wall Prolapse  2.  Apical Prolapse  3. Stress Urinary Incontinence    Postoperative Diagnosis:   1Anterior Wall Prolapse  2.  Apical Prolapse  3. Stress Urinary Incontinence    Todd Olsen MD    EBL: 100cc    Urine Output: 200cc    Specimen: Uterus, Fallopian tubes and ovaries      INTRAOPERATIVE FINDINGS: Laparoscopic inspection revealed normal-appearing tubes and ovaries.  Fibroid uterus. Intraoperative cystourethroscopy revealed normal bladder and urethral mucosa without evidence of laceration, foreign body, neoplasm, or stone. Both ureters were effluxing urine at the conclusion of the case. Rectal exam was normal at the conclusion of the case.    INDICATIONS FOR PROCEDURE: This is a 70-year-old female with a history of worsening symptomatic pelvic organ prolapse who desired definitive surgical treatment after being extensively counseled on the risks, benefits, indications, and alternatives of the procedure. Risks reviewed include bleeding, infection, damage to the bladder, ureters, urethra, bowel, blood vessels, nerves, postoperative urinary retention, postoperative incontinence, pelvic pain, dyspareunia, recurrence, mesh erosion, anesthetic complications, and death. The patient expressed understanding and informed consent was obtained.    DESCRIPTION OF PROCEDURE: On the day of surgery, the patient was identified in the preop waiting area. Consents were again reviewed. The patient was then taken to the operating room where she was placed in a dorsal lithotomy position using Yellofin stirrups in a neurologically safe position. Antithrombotic boots were activated. General anesthesia was induced without difficulty. Her arms were then tucked and she was padded and strapped to the table. She was prepped

## 2025-03-11 NOTE — ANESTHESIA POSTPROCEDURE EVALUATION
Department of Anesthesiology  Postprocedure Note    Patient: Sylvia Aguila  MRN: [id number]  YOB: 1955  Date of evaluation: 3/11/2025    Procedure Summary       Date: 03/11/25 Room / Location: [facility] MAIN OR  / [facility] MAIN OR    Anesthesia Start: 0702 Anesthesia Stop: 1023    Procedures:       SACRAL COLPOPEXY ROBOTIC (Abdomen)      LAPAROSCOPIC HYSTERECTOMY BSO (Bilateral: Abdomen)      MIDURETHRAL SLING INSERTION (Vagina ) Diagnosis:       Uterovaginal prolapse, complete      Female stress incontinence      (Uterovaginal prolapse, complete [N81.3])      (Female stress incontinence [N39.3])    Surgeons: Sanam Reid DO Responsible Provider: Todd Olsen MD    Anesthesia Type: general ASA Status: 3            Anesthesia Type: No value filed.    Vini Phase I: Vini Score: 10    Vini Phase II:      Anesthesia Post Evaluation    Patient location during evaluation: PACU  Patient participation: complete - patient participated  Level of consciousness: awake  Airway patency: patent  Nausea & Vomiting: no nausea and no vomiting  Cardiovascular status: blood pressure returned to baseline  Respiratory status: acceptable  Hydration status: euvolemic  Comments: --------------------            03/11/25               1135     --------------------   BP:       93/64      Pulse:      60       Resp:       13       Temp:                SpO2:      95%      --------------------    Pain management: adequate        No notable events documented.

## 2025-03-13 ENCOUNTER — NURSE ONLY (OUTPATIENT)
Dept: UROGYNECOLOGY | Age: 70
End: 2025-03-13

## 2025-03-13 DIAGNOSIS — N81.3 UTEROVAGINAL PROLAPSE, COMPLETE: ICD-10-CM

## 2025-03-13 DIAGNOSIS — N81.89 WEAKNESS OF PELVIC FLOOR: ICD-10-CM

## 2025-03-13 DIAGNOSIS — N39.3 FEMALE STRESS INCONTINENCE: Primary | ICD-10-CM

## 2025-03-13 RX ORDER — MIDAZOLAM HYDROCHLORIDE 1 MG/ML
INJECTION, SOLUTION INTRAMUSCULAR; INTRAVENOUS
Status: DISCONTINUED | OUTPATIENT
Start: 2025-03-11 | End: 2025-03-13 | Stop reason: SDUPTHER

## 2025-03-13 NOTE — PROGRESS NOTES
Sylvia Aguila had surgery on Robotic Assisted total laparoscopic hysterectomyBilateral salpingoopherectomy Sacralcolpopexy Midurethral sling (obtryx II) Cystourethroscopy on 03/11/2025. She is here today for a void trial. The patient was placed in lithotomy. The saba bag was removed and 300 cc of sterile water was instilled into the bladder. The saba catheter was then removed. The patient was asked to void. She was able to void 280 cc.

## 2025-03-13 NOTE — ADDENDUM NOTE
Addendum  created 03/13/25 1308 by Heidi Shea APRN - CRNA    Intraprocedure Meds edited, Orders acknowledged in Narrator

## 2025-03-16 PROBLEM — E55.9 VITAMIN D DEFICIENCY: Status: ACTIVE | Noted: 2025-03-16

## 2025-03-16 PROBLEM — Z12.31 BREAST CANCER SCREENING BY MAMMOGRAM: Status: ACTIVE | Noted: 2025-03-16

## 2025-03-16 PROBLEM — Z91.89 AT RISK FOR BONE DENSITY LOSS: Status: ACTIVE | Noted: 2025-03-16

## 2025-03-21 ENCOUNTER — OFFICE VISIT (OUTPATIENT)
Age: 70
End: 2025-03-21
Payer: MEDICARE

## 2025-03-21 VITALS
WEIGHT: 202 LBS | BODY MASS INDEX: 29.92 KG/M2 | SYSTOLIC BLOOD PRESSURE: 90 MMHG | DIASTOLIC BLOOD PRESSURE: 68 MMHG | HEART RATE: 60 BPM | HEIGHT: 69 IN

## 2025-03-21 DIAGNOSIS — E78.00 PURE HYPERCHOLESTEROLEMIA: ICD-10-CM

## 2025-03-21 DIAGNOSIS — I50.22 CHRONIC SYSTOLIC CHF (CONGESTIVE HEART FAILURE) (HCC): ICD-10-CM

## 2025-03-21 DIAGNOSIS — I48.0 PAF (PAROXYSMAL ATRIAL FIBRILLATION) (HCC): Primary | ICD-10-CM

## 2025-03-21 PROCEDURE — G8399 PT W/DXA RESULTS DOCUMENT: HCPCS | Performed by: INTERNAL MEDICINE

## 2025-03-21 PROCEDURE — 3017F COLORECTAL CA SCREEN DOC REV: CPT | Performed by: INTERNAL MEDICINE

## 2025-03-21 PROCEDURE — 1126F AMNT PAIN NOTED NONE PRSNT: CPT | Performed by: INTERNAL MEDICINE

## 2025-03-21 PROCEDURE — 1123F ACP DISCUSS/DSCN MKR DOCD: CPT | Performed by: INTERNAL MEDICINE

## 2025-03-21 PROCEDURE — 1036F TOBACCO NON-USER: CPT | Performed by: INTERNAL MEDICINE

## 2025-03-21 PROCEDURE — G8417 CALC BMI ABV UP PARAM F/U: HCPCS | Performed by: INTERNAL MEDICINE

## 2025-03-21 PROCEDURE — 99214 OFFICE O/P EST MOD 30 MIN: CPT | Performed by: INTERNAL MEDICINE

## 2025-03-21 PROCEDURE — 1090F PRES/ABSN URINE INCON ASSESS: CPT | Performed by: INTERNAL MEDICINE

## 2025-03-21 PROCEDURE — G8428 CUR MEDS NOT DOCUMENT: HCPCS | Performed by: INTERNAL MEDICINE

## 2025-03-21 RX ORDER — SPIRONOLACTONE 25 MG/1
25 TABLET ORAL DAILY
Qty: 30 TABLET | Refills: 11 | Status: SHIPPED | OUTPATIENT
Start: 2025-03-21

## 2025-03-21 NOTE — PROGRESS NOTES
Passive exposure: Never    Smokeless tobacco: Never   Substance Use Topics    Alcohol use: Not Currently              PHYSICAL EXAM:   BP 90/68   Pulse 60   Ht 1.753 m (5' 9\")   Wt 91.6 kg (202 lb)   BMI 29.83 kg/m²    Constitutional: Oriented to person, place, and time. Appears well-developed and well-nourished.   Head: Normocephalic and atraumatic.   Neck: Neck supple.   Cardiovascular: Normal rate and regular rhythm with no murmur -No JVP  Pulmonary/Chest: Breath sounds normal.   Abdominal: Soft.   Musculoskeletal: No edema.   Neurological: Alert and oriented to person, place, and time.   Skin: Skin is warm and dry.   Psychiatric: Normal mood and affect.   Vitals reviewed.      Wt Readings from Last 3 Encounters:   03/21/25 91.6 kg (202 lb)   03/11/25 94.3 kg (207 lb 12.8 oz)   03/10/25 93.9 kg (207 lb)       Medical problems and test results were reviewed with the patient today.       ASSESSMENT and PLAN    1. PAF (paroxysmal atrial fibrillation) (HCC)  Stable.Continue eliquis and sotalol      2. Pure hypercholesterolemia  Stable.Continue lipitor      3. Chronic systolic CHF (congestive heart failure) (HCC)  Stable.Continue entresto, toprol, aldactone and jardiance  Ef 35       Return in about 6 months (around 9/21/2025).         KRUPA BIRD MD  3/21/2025  10:27 AM

## 2025-04-10 ENCOUNTER — OFFICE VISIT (OUTPATIENT)
Dept: UROGYNECOLOGY | Age: 70
End: 2025-04-10

## 2025-04-10 DIAGNOSIS — N81.89 WEAKNESS OF PELVIC FLOOR: ICD-10-CM

## 2025-04-10 DIAGNOSIS — N81.3 UTEROVAGINAL PROLAPSE, COMPLETE: Primary | ICD-10-CM

## 2025-04-10 PROCEDURE — 99024 POSTOP FOLLOW-UP VISIT: CPT | Performed by: OBSTETRICS & GYNECOLOGY

## 2025-04-10 NOTE — PROGRESS NOTES
[facility] UROGYNECOLOGY  [address]  [address]  [place] [address]  Dept: [phone]        PCP:  Any Alvarez MD    4/10/2025    Chief Complaint   Patient presents with    Post-Op Check     HPI:  Sylvia Aguila is here for her postop check. She had a Robotic Assisted total laparoscopic hysterectomyBilateral salpingoopherectomy Sacralcolpopexy Midurethral sling (obtryx II) Cystourethroscopy on 03/11/2025. She is doing very well today. She denies fevers, chills nausea, vomiting, chest pain, shortness of breath. She is ambulating, tolerating a regular diet, and pain is well controlled. She does not have any vaginal bleeding and is currently back to doing her normal activities of daily living.  She does not have any difficulty with urination or bowel movements.     She does not have any signs or symptoms of POP or incontinence recurrence.    No results found for this visit on 04/10/25.    There were no vitals taken for this visit.    Exam: This includes at least 4 minutes of clinical staff time associated with chaperoning a pelvic exam.  Incisions:  Clean, Dry, and Intact. Healing well.   Vulva:    Normal. No lesions  Bartholin's Gland:  Bilateral , Normal, nontender  Skenes Gland:  Bilateral, Normal, nontender   Clitoris:  Normal.   Introitus:    Normal.   Urethral Meatus:  Normal appearing, normal size, no lesions, no prolapse  Urethra:  No masses, no tenderness  Vagina:  No atrophy, no discharge, no lesions  Bladder:  No tenderness, no masses palpated  Perineum:  Normal, no lesions    Rectal   Anorectal Exam: No hemorrhoids and no masses or lesions of the perineum           10/24/2024     1:00 PM   Pelvic Organ Prolapse Quantification   Anterior Wall (Aa) 1   Anterior Wall (Ba) 1   Cervix or Cuff (C) 0   Genital Haitus (gh) 2   Perineal Body (pb) 1   Total Vaginal Length (tvl) 7   Posterior Wall (Ap) -3   Posterior Wall (Bp) -3   Posterior Fornix (D) -5            1. Uterovaginal prolapse,

## 2025-04-10 NOTE — ASSESSMENT & PLAN NOTE
For another 4 weeks:  No heavy lifting   No bathing/ soaking. No hot tubs etc. You may shower.  Nothing in the vagina (sex, tampons, douching)    You may slowly start to resume your normal activity.    You have sutures in the vagina that may start to come out (this is normal) they look like white thread.

## 2025-04-10 NOTE — PATIENT INSTRUCTIONS
Pelvic Floor Therapy List:    Locations within [facility]    Scheduling phone number: [phone]    [facility]       [address]  [place] [address]    [facility]  [address]  [place] [address]    [facility]  [address]  [place] [address]    [facility]  [address] [address]      Locations Outside of [facility]    [facility]- (Angie Brady & Irina Iverson)  [address]  [place] [address]  Phone: [phone]  Fax [phone]    [facility] -(Keli Mariano)  [address] [address]  Phone: [phone]  Fax: [phone]    [facility] (Renea Savage)  [address]  [place] [address]  Phone: [phone]  Fax: [phone]    [facility] (Ainsley Florian)  [address] [address]  Phone: [phone]  Fax: [phone]    [facility] (Dr. Nereyda Purcell and Dr. Renea Acosta)  [address],   [place] [address]   Phone: [phone]  Fax: [phone]  &  [address] [address]    [facility] (Shira Arellano)  [address] [place] [address]  Phone: [phone]  Fax: [phone]    [facility]- (Genesis Mendoza)  [address]  [facility] [address]  Phone: [phone]  Fax: [phone]    [facility] (Apolonia Nagy)  [address] [place] [address] [phone]  Fax [phone]    [facility]- (Riky Rush)  [address]   [place] [address]  Phone: [phone]  Fax: [phone]    [facility] (Irina Luu)  [address] [place] [address]  Phone: [phone]  Fax: [phone]

## 2025-04-28 ENCOUNTER — HOSPITAL ENCOUNTER (OUTPATIENT)
Dept: PHYSICAL THERAPY | Age: 70
Setting detail: RECURRING SERIES
Discharge: HOME | End: 2025-05-01
Attending: OBSTETRICS & GYNECOLOGY
Payer: MEDICARE

## 2025-04-28 DIAGNOSIS — R27.8 OTHER LACK OF COORDINATION: Primary | ICD-10-CM

## 2025-04-28 DIAGNOSIS — M62.58 MUSCLE WASTING AND ATROPHY, NOT ELSEWHERE CLASSIFIED, OTHER SITE: ICD-10-CM

## 2025-04-28 PROCEDURE — 97530 THERAPEUTIC ACTIVITIES: CPT

## 2025-04-28 PROCEDURE — 97110 THERAPEUTIC EXERCISES: CPT

## 2025-04-28 PROCEDURE — 97161 PT EVAL LOW COMPLEX 20 MIN: CPT

## 2025-04-28 ASSESSMENT — PAIN SCALES - GENERAL: PAINLEVEL_OUTOF10: 0

## 2025-04-28 NOTE — PROGRESS NOTES
Sylvia Aguila  : 1955  Primary: Medicare Part A And B (Medicare)  Secondary: [facility]  [address]  [address]  [place] [address]  Phone: [phone]  Fax: [phone] Plan Frequency: every other week for 90 days    Plan of Care/Certification Expiration Date: 25        Plan of Care/Certification Expiration Date:  Plan of Care/Certification Expiration Date: 25    Frequency/Duration: Plan Frequency: every other week for 90 days      Time In/Out:   Time In: 1000  Time Out: 1102      PT Visit Info:    Total # of Visits Approved: 0 (med nec)  Total # of Visits to Date: 1      Visit Count:  1    OUTPATIENT PHYSICAL THERAPY:   Treatment Note 2025       Episode  (PFPT)               Treatment Diagnosis:    Other lack of coordination  Muscle wasting and atrophy, not elsewhere classified, other site  Medical/Referring Diagnosis:    Uterovaginal prolapse, complete [N81.3]  Weakness of pelvic floor [N81.89]      Referring Physician:  Sanam Reid DO MD Orders:  PT Eval and Treat   Return MD Appt:  25   Date of Onset:  Onset Date: 25 (surgery date)  Allergies:   Patient has no known allergies.  Restrictions/Precautions:   Cardiac Precautions: cardiomyopathy and CHF; has a three lead pacemaker place, limited to 5# weights over head and 8# kettle bell below the level of the heart; should not be above 120 BPM for HR for extended period of time, used Apple watch to monitor and alert.      Interventions Planned (Treatment may consist of any combination of the following):     See Assessment Note    Subjective Comments:   PFM weakness s/p Robotic Assisted total laparoscopic hysterectomyBilateral salpingoopherectomy Sacralcolpopexy Midurethral sling (obtryx II) Cystourethroscopy on 2025.     Initial Pain Level::     0/10  Post Session Pain Level:       0/10  Medications Last Reviewed:  2025  Updated Objective Findings:  See Evaluation Note from

## 2025-04-28 NOTE — THERAPY EVALUATION
Midurethral sling (obtryx II) Cystourethroscopy on 03/11/2025. She has been doing very well since. She is not having any symptoms currently but her most recent exam revealed significant weakness of the PFM.     Urinary: Frequency 5-8 x/day, 1 x/night.  Positive for  nothing .   Negative for stress urinary incontinence, urge urinary incontinence, urinary urgency, urinary frequency, incomplete emptying, urinary hesitancy/intermittency, dysuria, hematuria, nocturia, and nocturnal enuresis.   Pt does not use thin pad for urinary protection; 0 pads per day (PPD).          Fluid intake: 3L water/day; bladder irritants include: N/A. Pt does not limit fluid intake due to bladder control.    Bowel: Frequency 1x/day to every other day.   Positive for incomplete emptying.   Negative for pain with bowel movement, pushing/straining with bowel movement, fecal incontinence, and constipation.   Pt does not use thin pad for bowel protection; 0 pads per day (PPD). Bruno stool type: 4. Use of stool softeners or laxatives? No    Sexual: Pt is not sexually active currently due to partners health status and not cleared until Mother's Day post op. Contraception/birth control: s/p hysterectomy. Negative for history of dyspareunia. History of sexual abuse: No    Pelvic Organ Prolapse/Pelvic Pain: Denies    Menstrual History: s/p hysterectomy    OB History: Number of pregnancies: 2 Number of vaginal births: 2 Number of caesarean births : 0.       Patient Stated Goal(s):  to get stronger    Initial Pain Level:     0/10   Post Session Pain Level:     0/10    Past Medical History/Comorbidities:   Ms. Aguila  has a past medical history of Arrhythmia, Atrial fibrillation ([facility]), Breast cancer ([facility]), CAD (coronary artery disease), Cancer ([facility]), Chronic kidney disease, stage III (moderate) ([facility]), Chronic systolic CHF (congestive heart failure) ([facility]), Diabetes ([facility]), GERD (gastroesophageal reflux disease), Heart attack ([facility]), Heart disease, Heart

## 2025-05-05 ENCOUNTER — HOSPITAL ENCOUNTER (OUTPATIENT)
Dept: PHYSICAL THERAPY | Age: 70
Setting detail: RECURRING SERIES
Discharge: HOME | End: 2025-05-08
Attending: OBSTETRICS & GYNECOLOGY
Payer: MEDICARE

## 2025-05-05 PROCEDURE — 97530 THERAPEUTIC ACTIVITIES: CPT

## 2025-05-05 PROCEDURE — 97110 THERAPEUTIC EXERCISES: CPT

## 2025-05-05 ASSESSMENT — PAIN SCALES - GENERAL: PAINLEVEL_OUTOF10: 0

## 2025-05-05 NOTE — PROGRESS NOTES
Sylvia Aguila  : 1955  Primary: Medicare Part A And B (Medicare)  Secondary: [facility] STATE O [facility]  [address]  [address]  [facility] [address]  Phone: [phone]  Fax: [phone] Plan Frequency: every other week for 90 days    Plan of Care/Certification Expiration Date: 25        Plan of Care/Certification Expiration Date:  Plan of Care/Certification Expiration Date: 25    Frequency/Duration: Plan Frequency: every other week for 90 days      Time In/Out:   Time In: 1045  Time Out: 1147      PT Visit Info:    Total # of Visits Approved: 0 (med nec)  Total # of Visits to Date: 2      Visit Count:  2    OUTPATIENT PHYSICAL THERAPY:   Treatment Note 2025       Episode  (PFPT)               Treatment Diagnosis:    Other lack of coordination  Muscle wasting and atrophy, not elsewhere classified, other site  Medical/Referring Diagnosis:    Uterovaginal prolapse, complete [N81.3]  Weakness of pelvic floor [N81.89]    Referring Physician:  Sanam Reid DO MD Orders:  PT Eval and Treat   Return MD Appt:  25   Date of Onset:  Onset Date: 25 (surgery date)  Allergies:   Patient has no known allergies.  Restrictions/Precautions:   Cardiac Precautions: cardiomyopathy and CHF; has a three lead pacemaker place, limited to 5# weights over head and 8# kettle bell below the level of the heart; should not be above 120 BPM for HR for extended period of time, uses Apple watch to monitor and alert.      Interventions Planned (Treatment may consist of any combination of the following):   See Assessment Note    Subjective Comments:   Pt has been working on exercises and finds it very challenging to not hold her breath with doing exercises. She did try to stop the flow while urinating and was not able to. She has been trying to use a stool for toilet positioning and is doing well with this.    Initial Pain Level::     0/10  Post Session Pain Level:       0/10  Medications

## 2025-05-29 ENCOUNTER — HOSPITAL ENCOUNTER (OUTPATIENT)
Dept: MAMMOGRAPHY | Age: 70
Discharge: HOME | End: 2025-05-29
Attending: INTERNAL MEDICINE
Payer: MEDICARE

## 2025-05-29 ENCOUNTER — HOSPITAL ENCOUNTER (OUTPATIENT)
Dept: PHYSICAL THERAPY | Age: 70
Setting detail: RECURRING SERIES
End: 2025-05-29
Attending: OBSTETRICS & GYNECOLOGY
Payer: MEDICARE

## 2025-05-29 DIAGNOSIS — Z79.811 AROMATASE INHIBITOR USE: ICD-10-CM

## 2025-05-29 DIAGNOSIS — Z91.89 AT RISK FOR BONE DENSITY LOSS: ICD-10-CM

## 2025-05-29 PROCEDURE — 97110 THERAPEUTIC EXERCISES: CPT

## 2025-05-29 PROCEDURE — 77080 DXA BONE DENSITY AXIAL: CPT

## 2025-05-29 ASSESSMENT — PAIN SCALES - GENERAL: PAINLEVEL_OUTOF10: 0

## 2025-05-29 NOTE — PROGRESS NOTES
Sylvia Aguila  : 1955  Primary: Medicare Part A And B (Medicare)  Secondary: [place] [address]  [address]  [facility] [address]  Phone: [phone]  Fax: [phone] Plan Frequency: every other week for 90 days    Plan of Care/Certification Expiration Date: 25        Plan of Care/Certification Expiration Date:  Plan of Care/Certification Expiration Date: 25    Frequency/Duration: Plan Frequency: every other week for 90 days      Time In/Out:   Time In: 929  Time Out: 101      PT Visit Info:    Total # of Visits Approved: 0 (med nec)  Total # of Visits to Date: 3      Visit Count:  3    OUTPATIENT PHYSICAL THERAPY:   Treatment Note 2025       Episode  (PFPT)               Treatment Diagnosis:    Other lack of coordination  Muscle wasting and atrophy, not elsewhere classified, other site  Medical/Referring Diagnosis:    Uterovaginal prolapse, complete [N81.3]  Weakness of pelvic floor [N81.89]    Referring Physician:  Sanam Reid DO MD Orders:  PT Eval and Treat   Return MD Appt:  25   Date of Onset:  Onset Date: 25 (surgery date)  Allergies:   Patient has no known allergies.  Restrictions/Precautions:   Cardiac Precautions: cardiomyopathy and CHF; has a three lead pacemaker place, limited to 5# weights over head and 8# kettle bell below the level of the heart; should not be above 120 BPM for HR for extended period of time, uses Apple watch to monitor and alert.      Interventions Planned (Treatment may consist of any combination of the following):   See Assessment Note    Subjective Comments:   Pt did well with consistency of exercises for the first 2 week but less consistent this past week with having guests to her house and traveling. She does find the breathing to be getting easier.   Initial Pain Level::     0/10  Post Session Pain Level:       0/10  Medications Last Reviewed:  2025  Updated Objective    Lubricants/vaginal moisturizers     Vulvovaginal health/vaginal irritants     Body mechanics Pressure management 5/5/25   Posture/ergonomics     Diaphragmatic breathing     Resources and technology     Other patient education       Chaperone for Intimate Exam  Chaperone was offered as part of the rooming process. Patient declined and agrees to continue with exam without a chaperone.  Chaperone: N/A  Pt gives verbal consent to internal vaginal assessment/treatment.    Treatment/Session Summary:    Treatment Assessment:   Pt with significant improvements in coordination of breathing with movement today. She is able to verbally identify when PF activation and relaxation should happen during exercises and reports awareness of PF activation with activities. Exercises were progressed and tolerated well without issues.  Communication/Consultation:  None today  Equipment provided today:  HEP  Recommendations/Intent for next treatment session: Next visit will focus on PFM strength, coordination, pressure management with current exercise routine.    >Total Treatment Billable Duration:  45 minutes  Time In: 0929  Time Out: 1015    Briseyda Han PT         Charge Capture  Events  Eligible Portal  Appt Desk  Attendance Report     Future Appointments   Date Time Provider Department Center   6/12/2025  9:30 AM Briseyda Han PT [facility]   6/26/2025  9:30 AM Briseyda Han PT [facility]   7/10/2025  9:30 AM Briseyda Han PT [facility]   7/16/2025  9:45 AM Sanam Reid DO [facility]   8/11/2025  2:10 PM [facility]   8/13/2025  3:00 PM Theresa Freitas, APRN - CNP [facility] AM[facility]   9/15/2025  9:15 AM Nikunj Dacosta MD [facility]   9/19/2025 10:45 AM [facility] ROOM 4 [facility]   1/12/2026  4:00 PM PER[facility]HERAL [facility]   1/14/2026  4:00 PM Jacy Dorado MD [facility]        (2) well flexed

## 2025-06-12 ENCOUNTER — HOSPITAL ENCOUNTER (OUTPATIENT)
Dept: PHYSICAL THERAPY | Age: 70
Setting detail: RECURRING SERIES
Discharge: HOME | End: 2025-06-15
Attending: OBSTETRICS & GYNECOLOGY
Payer: MEDICARE

## 2025-06-12 PROCEDURE — 97110 THERAPEUTIC EXERCISES: CPT

## 2025-06-12 ASSESSMENT — PAIN SCALES - GENERAL: PAINLEVEL_OUTOF10: 0

## 2025-06-12 NOTE — PROGRESS NOTES
Sylvia Aguila  : 1955  Primary: Medicare Part A [place] B (Medicare)  Secondary: [place] [address]  [address]  [facility] [address]  Phone: [phone]  Fax: [phone] Plan Frequency: every other week for 90 days    Plan of Care/Certification Expiration Date: 25        Plan of Care/Certification Expiration Date:  Plan of Care/Certification Expiration Date: 25    Frequency/Duration: Plan Frequency: every other week for 90 days      Time In/Out:   Time In: 933  Time Out: 1023      PT Visit Info:    Total # of Visits Approved: 0 (med nec)  Total # of Visits to Date: 4      Visit Count:  4    OUTPATIENT PHYSICAL THERAPY:   Treatment Note 2025       Episode  (PFPT)               Treatment Diagnosis:    Other lack of coordination  Muscle wasting and atrophy, not elsewhere classified, other site  Medical/Referring Diagnosis:    Uterovaginal prolapse, complete [N81.3]  Weakness of pelvic floor [N81.89]    Referring Physician:  Sanam Reid DO MD Orders:  PT Eval and Treat   Return MD Appt:  25   Date of Onset:  Onset Date: 25 (surgery date)  Allergies:   Patient has no known allergies.  Restrictions/Precautions:   Cardiac Precautions: cardiomyopathy and CHF; has a three lead pacemaker place, limited to 5# weights over head and 8# kettle bell below the level of the heart; should not be above 120 BPM for HR for extended period of time, uses Apple watch to monitor and alert.      Interventions Planned (Treatment may consist of any combination of the following):   See Assessment Note    Subjective Comments:   Injured her foot this past week so has not been able to work on exercises much. Denies any issues this past week.  Initial Pain Level::     0/10  Post Session Pain Level:       0/10  Medications Last Reviewed:  2025  Updated Objective Findings:  None Today  Treatment   THERAPEUTIC EXERCISE: (50 minutes): Exercises

## 2025-06-26 ENCOUNTER — APPOINTMENT (OUTPATIENT)
Dept: PHYSICAL THERAPY | Age: 70
End: 2025-06-26
Attending: OBSTETRICS & GYNECOLOGY
Payer: MEDICARE

## 2025-07-16 ENCOUNTER — OFFICE VISIT (OUTPATIENT)
Dept: UROGYNECOLOGY | Age: 70
End: 2025-07-16

## 2025-07-16 DIAGNOSIS — N81.3 UTEROVAGINAL PROLAPSE, COMPLETE: Primary | ICD-10-CM

## 2025-07-16 DIAGNOSIS — N81.89 WEAKNESS OF PELVIC FLOOR: ICD-10-CM

## 2025-07-16 DIAGNOSIS — N95.2 VAGINAL ATROPHY: ICD-10-CM

## 2025-07-16 DIAGNOSIS — N39.3 FEMALE STRESS INCONTINENCE: ICD-10-CM

## 2025-07-16 NOTE — ASSESSMENT & PLAN NOTE
+ atrophy. She is s/p breast cancer and wants to talk to her oncologist about the possibility of using vag estrogen cream.

## 2025-07-16 NOTE — PROGRESS NOTES
[facility] UROGYNECOLOGY  [address]  [address]  [facility] [address]  Dept: [phone]        PCP:  Any Alvarez MD    7/16/2025    Chief Complaint   Patient presents with    3 Month Follow-Up     HPI:  Sylvia Aguila is here for her postop check. She had a Robotic Assisted total laparoscopic hysterectomyBilateral salpingoopherectomy Sacralcolpopexy Midurethral sling (obtryx II) Cystourethroscopy on 03/11/2025. She is doing very well today. She denies fevers, chills nausea, vomiting, chest pain, shortness of breath. She is ambulating, tolerating a regular diet, and pain is well controlled. She does not have any vaginal bleeding and is currently back to doing her normal activities of daily living.  She does not have any difficulty with urination or bowel movements.     Patient is not sexual activity due to other reason. She did PT with Briseyda Nix.     She does not have any signs or symptoms of POP or incontinence recurrence.    No results found for this visit on 07/16/25.    There were no vitals taken for this visit.    Exam: This includes at least 4 minutes of clinical staff time associated with chaperoning a pelvic exam.  Incisions:  Clean, Dry, and Intact. Healing well.   Vulva:    Normal. No lesions  Bartholin's Gland:  Bilateral , Normal, nontender  Skenes Gland:  Bilateral, Normal, nontender   Clitoris:  Normal.   Introitus:    Normal.   Urethral Meatus:  Normal appearing, normal size, no lesions, no prolapse  Urethra:  No masses, no tenderness  Vagina:  +atrophy, no discharge, no lesions, no signs of mesh erosion or complications  Bladder:  No tenderness, no masses palpated  Perineum:  Normal, no lesions    Rectal   Anorectal Exam: No hemorrhoids and no masses or lesions of the perineum           7/16/2025    10:00 AM 10/24/2024     1:00 PM   Pelvic Organ Prolapse Quantification   Anterior Wall (Aa) -3 1   Anterior Wall (Ba) -3 1   Cervix or Cuff (C) -6 0   Genital Haitus (gh) 2.5 2

## 2025-07-24 ENCOUNTER — TELEPHONE (OUTPATIENT)
Age: 70
End: 2025-07-24

## 2025-07-24 DIAGNOSIS — I50.22 CHRONIC SYSTOLIC CHF (CONGESTIVE HEART FAILURE) (HCC): Primary | ICD-10-CM

## 2025-07-24 NOTE — TELEPHONE ENCOUNTER
Merlin received w/ presenting slow VT in the 140's that self aborted. Therapy zone set at 170bpm    Patient was aware of the event \" it lasted about 30 seconds.\"    Medications reviewed w/ no recent changes  Patient currently \" driving to the beach till Sunday.\"  Denies any recent changes or new symptoms.     Labs ordered and device appt scheduled on monday for VT monitor changes.     Advised ER for recurrent events.

## 2025-07-27 ENCOUNTER — HOSPITAL ENCOUNTER (EMERGENCY)
Age: 70
Discharge: HOME | End: 2025-07-27
Payer: MEDICARE

## 2025-07-27 ENCOUNTER — APPOINTMENT (OUTPATIENT)
Dept: GENERAL RADIOLOGY | Age: 70
End: 2025-07-27
Payer: MEDICARE

## 2025-07-27 VITALS
TEMPERATURE: 98.3 F | SYSTOLIC BLOOD PRESSURE: 93 MMHG | OXYGEN SATURATION: 99 % | HEART RATE: 60 BPM | RESPIRATION RATE: 14 BRPM | DIASTOLIC BLOOD PRESSURE: 69 MMHG | HEIGHT: 69 IN | BODY MASS INDEX: 28.88 KG/M2 | WEIGHT: 195 LBS

## 2025-07-27 DIAGNOSIS — R07.89 ATYPICAL CHEST PAIN: Primary | ICD-10-CM

## 2025-07-27 LAB
ALBUMIN SERPL-MCNC: 3.8 G/DL (ref 3.2–4.6)
ALBUMIN/GLOB SERPL: 1 (ref 1–1.9)
ALP SERPL-CCNC: 117 U/L (ref 35–104)
ALT SERPL-CCNC: 25 U/L (ref 8–45)
ANION GAP SERPL CALC-SCNC: 12 MMOL/L (ref 7–16)
AST SERPL-CCNC: 42 U/L (ref 15–37)
BASOPHILS # BLD: 0.04 K/UL (ref 0–0.2)
BASOPHILS NFR BLD: 0.5 % (ref 0–2)
BILIRUB SERPL-MCNC: 0.9 MG/DL (ref 0–1.2)
BUN SERPL-MCNC: 17 MG/DL (ref 8–23)
CALCIUM SERPL-MCNC: 10.1 MG/DL (ref 8.8–10.2)
CHLORIDE SERPL-SCNC: 103 MMOL/L (ref 98–107)
CO2 SERPL-SCNC: 24 MMOL/L (ref 20–29)
CREAT SERPL-MCNC: 1.17 MG/DL (ref 0.6–1.1)
DIFFERENTIAL METHOD BLD: NORMAL
EKG ATRIAL RATE: 63 BPM
EKG DIAGNOSIS: NORMAL
EKG P AXIS: 79 DEGREES
EKG P-R INTERVAL: 222 MS
EKG Q-T INTERVAL: 498 MS
EKG QRS DURATION: 116 MS
EKG QTC CALCULATION (BAZETT): 585 MS
EKG R AXIS: 259 DEGREES
EKG T AXIS: 77 DEGREES
EKG VENTRICULAR RATE: 83 BPM
EOSINOPHIL # BLD: 0.11 K/UL (ref 0–0.8)
EOSINOPHIL NFR BLD: 1.5 % (ref 0.5–7.8)
ERYTHROCYTE [DISTWIDTH] IN BLOOD BY AUTOMATED COUNT: 13.6 % (ref 11.9–14.6)
GLOBULIN SER CALC-MCNC: 3.8 G/DL (ref 2.3–3.5)
GLUCOSE SERPL-MCNC: 110 MG/DL (ref 70–99)
HCT VFR BLD AUTO: 43.5 % (ref 35.8–46.3)
HGB BLD-MCNC: 14.4 G/DL (ref 11.7–15.4)
IMM GRANULOCYTES # BLD AUTO: 0.02 K/UL (ref 0–0.5)
IMM GRANULOCYTES NFR BLD AUTO: 0.3 % (ref 0–5)
LYMPHOCYTES # BLD: 2.09 K/UL (ref 0.5–4.6)
LYMPHOCYTES NFR BLD: 28.2 % (ref 13–44)
MAGNESIUM SERPL-MCNC: 2.1 MG/DL (ref 1.8–2.4)
MCH RBC QN AUTO: 30.6 PG (ref 26.1–32.9)
MCHC RBC AUTO-ENTMCNC: 33.1 G/DL (ref 31.4–35)
MCV RBC AUTO: 92.6 FL (ref 82–102)
MONOCYTES # BLD: 0.43 K/UL (ref 0.1–1.3)
MONOCYTES NFR BLD: 5.8 % (ref 4–12)
NEUTS SEG # BLD: 4.73 K/UL (ref 1.7–8.2)
NEUTS SEG NFR BLD: 63.7 % (ref 43–78)
NRBC # BLD: 0 K/UL (ref 0–0.2)
PLATELET # BLD AUTO: 234 K/UL (ref 150–450)
PMV BLD AUTO: 10.3 FL (ref 9.4–12.3)
POTASSIUM SERPL-SCNC: 4.8 MMOL/L (ref 3.5–5.1)
PROT SERPL-MCNC: 7.6 G/DL (ref 6.3–8.2)
RBC # BLD AUTO: 4.7 M/UL (ref 4.05–5.2)
SODIUM SERPL-SCNC: 138 MMOL/L (ref 136–145)
TROPONIN T SERPL HS-MCNC: 20.2 NG/L (ref 0–14)
TROPONIN T SERPL HS-MCNC: 23.1 NG/L (ref 0–14)
TSH W FREE THYROID IF ABNORMAL: 3.98 UIU/ML (ref 0.27–4.2)
WBC # BLD AUTO: 7.4 K/UL (ref 4.3–11.1)

## 2025-07-27 PROCEDURE — 2580000003 HC RX 258: Performed by: EMERGENCY MEDICINE

## 2025-07-27 PROCEDURE — 83735 ASSAY OF MAGNESIUM: CPT

## 2025-07-27 PROCEDURE — 99285 EMERGENCY DEPT VISIT HI MDM: CPT

## 2025-07-27 PROCEDURE — 80053 COMPREHEN METABOLIC PANEL: CPT

## 2025-07-27 PROCEDURE — 85025 COMPLETE CBC W/AUTO DIFF WBC: CPT

## 2025-07-27 PROCEDURE — 84484 ASSAY OF TROPONIN QUANT: CPT

## 2025-07-27 PROCEDURE — 84443 ASSAY THYROID STIM HORMONE: CPT

## 2025-07-27 PROCEDURE — 93005 ELECTROCARDIOGRAM TRACING: CPT | Performed by: EMERGENCY MEDICINE

## 2025-07-27 PROCEDURE — 71046 X-RAY EXAM CHEST 2 VIEWS: CPT

## 2025-07-27 PROCEDURE — 93010 ELECTROCARDIOGRAM REPORT: CPT | Performed by: INTERNAL MEDICINE

## 2025-07-27 RX ORDER — 0.9 % SODIUM CHLORIDE 0.9 %
1000 INTRAVENOUS SOLUTION INTRAVENOUS
Status: COMPLETED | OUTPATIENT
Start: 2025-07-27 | End: 2025-07-27

## 2025-07-27 RX ADMIN — SODIUM CHLORIDE 1000 ML: 0.9 INJECTION, SOLUTION INTRAVENOUS at 12:48

## 2025-07-27 ASSESSMENT — PAIN SCALES - GENERAL: PAINLEVEL_OUTOF10: 0

## 2025-07-27 ASSESSMENT — LIFESTYLE VARIABLES
HOW OFTEN DO YOU HAVE A DRINK CONTAINING ALCOHOL: 2-4 TIMES A MONTH
HOW MANY STANDARD DRINKS CONTAINING ALCOHOL DO YOU HAVE ON A TYPICAL DAY: 1 OR 2

## 2025-07-27 ASSESSMENT — PAIN - FUNCTIONAL ASSESSMENT
PAIN_FUNCTIONAL_ASSESSMENT: 0-10
PAIN_FUNCTIONAL_ASSESSMENT: NONE - DENIES PAIN

## 2025-07-27 NOTE — ED PROVIDER NOTES
Emergency Department Provider Note       [facility] EMERGENCY DEPT   PCP: Any Alvarez MD   Age: 70 y.o.   Sex: female     DISPOSITION Decision To Discharge 07/27/2025 01:21:15 PM    ICD-10-CM    1. Atypical chest pain  R07.89           Medical Decision Making     70-year-old female presenting with a chief complaint of palpitations since Wednesday.  Vital stable and reassuring.  Physical exam notable for faint upper and lower extremity pulses.  Patient does have a notable prior history of reduced ejection fraction down to 30% per heart cath.  Patient is followed by cardiology, and has an implantable device which records arrhythmias can be sent directly to her cardiologist.  Reports that she has seen multiple transmissions since symptoms began, I am unable to view this data through either epic or her cell phone.  Initial troponin mildly elevated 23, second troponin reduced to 20, these mild elevations are likely due to patient's stable CKD.  However given the reduction between the 1st and 2nd troponin, I am not suspicious of acute ACS.  Magnesium normal at 2.1, TSH normal at 3.98, patient's last TSH prior to ER evaluation was 2.5 a number of months prior.  Reports she has not had any recent thyroid medication changes, advised to repeat follow-up with PCP for expanded thyroid assay and consideration of further treatment if necessary.  Electrolytes within normal limits, stage III CKD present, renal function reduced.  Hypercalcemia not present but calcium borderline at 10.1 which has significantly increased since December 2024.  Patient is taking oral supplemental calcium along with vitamin D without vitamin K2, advised further discussion with both her PCP and cardiologist regarding this.  Alkaline phosphatase elevated at 117, this appears to be a chronic stable change in this patient.  No leukocytosis or left shift, H&H stable, no thrombocytopenia.  EKG reveals this rhythm with prolonged AV conduction with frequent

## 2025-07-27 NOTE — DISCHARGE INSTRUCTIONS
Please find a follow-up with cardiology tomorrow and discuss all the findings of your visit today.  Should you experiencing worsening or current symptoms, please return to the ER.

## 2025-07-27 NOTE — ED TRIAGE NOTES
Pt arrives ambulatory to triage with c/o intermittent palpitations; onset Wednesday. States these episodes last approximately twenty minutes. When it occurs she become clammy and nauseated. States her smart watch showed HR in 150s. States cardiology office called and told her on Thursday she had episode of Vtach. Has AICD. States she was instructed by cardiology if the palpitations occur again to take an extra Metoprolol that she did take today. Takes Eliquis.

## 2025-07-28 ENCOUNTER — TELEPHONE (OUTPATIENT)
Age: 70
End: 2025-07-28

## 2025-07-28 ENCOUNTER — CLINICAL SUPPORT (OUTPATIENT)
Age: 70
End: 2025-07-28

## 2025-07-28 DIAGNOSIS — I50.22 CHRONIC SYSTOLIC CHF (CONGESTIVE HEART FAILURE) (HCC): Primary | ICD-10-CM

## 2025-07-28 NOTE — TELEPHONE ENCOUNTER
Dr Oro,    Not sure if last message successfully was sent to you?  Patient with slow VT last week.  VT monitor zone added today at 140 bpm.  Patient presented today with frequent PVC's and short runs of NSVT 3-4 beats duration.  Patient symptomatic feeling heart racing.  She was in ER yesterday for this.  She follows with you and Dr Dacosta.  She is asking to have a ECHO scheduled saying her EF has dropped.  She would like to follow up with you.  See full report in [facility] (marked as a red alert).  Please let me know how to proceed.  Thank you.

## 2025-07-29 ENCOUNTER — TELEPHONE (OUTPATIENT)
Age: 70
End: 2025-07-29

## 2025-07-29 NOTE — TELEPHONE ENCOUNTER
Patient w/ increasing NSTVT episodes, No treated events.   has echo and upcoming dania gonzalez appt. Seen in ER over the weekend.     Patient noting some episodes of \" heavy chest pressure.\" Correlating w/ VT events    Last cath was in 2021. No evidence of obstructive coronary artery disease. Proximal Ectasia noted in coronaries . Slow filling vessels likely in the setting of microvascular disease.          70-year-old female PMH A-fib, ICD, CHF, SHIRLEY, hypothyroidism, cardiomyopathy, stage III CKD, GERD, breast cancer presenting with chief complaint of palpitations that began Wednesday.  Patient reports that the episodes lasted approximately 20 minutes and subsequently resolved, she send data from her ICD to her cardiologist with each subsequent event, reports that she has had intermittent events multiple days including Thursday, Friday, and last night.  Reports that the initial event started on Thursday but resolved and subsequently she went on a beach trip. She returned Friday due to additional events in the presence of nausea and diaphoresis.  Patient reports that she skipped meals intermittently on both Thursday and Friday, uncertain if events were precipitated by blood sugar changes.     Reports that her Apple watch showed heart rates in the 150s.  Reports that she called cardiology this morning and spoke to Dr. Byrne who instructed her to come to the ER when she experienced another event at roughly 5 AM this morning.  She was instructed by cardiology to take an extra metoprolol should it recur which she did today prior to presenting.  Patient reports that her blood pressure at home was 69/61 at 1 point, reports that this occurred seemingly immediately after she took all of her daily medication.  Vitals at the time of evaluation are normal, patient is asymptomatic.  Denies dyspnea, cough, angina, lower extremity swelling, abdominal pain, back pain, recent illness, medication change, thyroid tenderness, missed

## 2025-07-29 NOTE — PROGRESS NOTES
patient today.     CBC   Recent Labs     07/27/25  1005   WBC 7.4   HGB 14.4   HCT 43.5   MCV 92.6           CMP  Lab Results   Component Value Date     07/27/2025    K 4.8 07/27/2025     07/27/2025    CO2 24 07/27/2025    BUN 17 07/27/2025    CREATININE 1.17 (H) 07/27/2025    GLUCOSE 110 (H) 07/27/2025    CALCIUM 10.1 07/27/2025    BILITOT 0.9 07/27/2025    ALKPHOS 117 (H) 07/27/2025    AST 42 (H) 07/27/2025    ALT 25 07/27/2025    LABGLOM 50 (L) 07/27/2025    GFRAA >60 12/14/2019    AGRATIO 1.0 (L) 12/11/2019    GLOB 3.8 (H) 07/27/2025     INR  Lab Results   Component Value Date    INR 1.0 11/11/2019    PROTIME 13.8 11/11/2019      THYROID  Lab Results   Component Value Date    TSHELE 3.98 07/27/2025      Lipid Panel:  No results found for: \"CHOL\", \"TRIG\", \"HDL\", \"VLDL\", \"CHOLHDLRATIO\"     Device Interrogation 7/29/25:  Tachycardia: VT Detected in Monitor Zone    Sustained tachycardia event detected within programmed monitor zone  Additional Notes:  VT-1 zone turned on at 146bpm ATP only per Dr. Oro. Sotalol d/c'd and amiodarone started.     Sylvia was seen today for atrial fibrillation and cardiomyopathy.    Diagnoses and all orders for this visit:    NSVT (nonsustained ventricular tachycardia) (HCC)    Chronic systolic CHF (congestive heart failure) (HCC)  -     EKG 12 Lead  -     [facility]    High risk medication use    Persistent atrial fibrillation (HCC)    Other orders  -     amiodarone (CORDARONE) 200 MG tablet; Take 2 tablets by mouth 2 times daily for 7 days, THEN 1 tablet 2 times daily for 7 days, THEN 1 tablet daily.      ASSESSMENT and PLAN  NSVT  - Device interrogation normal, showing multiple episodes of NSVT falling in VT monitoring zone, VT therapy zone turned on today, ATP only   -  stop sotalol   - start amiodarone     2. Chronic systolic heart failure:   - initially diagnosed in 2014  - [facility] on 10/7/21 at [facility]: No evidence of

## 2025-07-30 ENCOUNTER — CLINICAL SUPPORT (OUTPATIENT)
Age: 70
End: 2025-07-30

## 2025-07-30 ENCOUNTER — OFFICE VISIT (OUTPATIENT)
Age: 70
End: 2025-07-30
Payer: MEDICARE

## 2025-07-30 VITALS
SYSTOLIC BLOOD PRESSURE: 94 MMHG | BODY MASS INDEX: 29.47 KG/M2 | HEART RATE: 62 BPM | WEIGHT: 199 LBS | HEIGHT: 69 IN | DIASTOLIC BLOOD PRESSURE: 70 MMHG

## 2025-07-30 DIAGNOSIS — Z79.899 HIGH RISK MEDICATION USE: ICD-10-CM

## 2025-07-30 DIAGNOSIS — I48.19 PERSISTENT ATRIAL FIBRILLATION (HCC): ICD-10-CM

## 2025-07-30 DIAGNOSIS — I47.29 NSVT (NONSUSTAINED VENTRICULAR TACHYCARDIA) (HCC): Primary | ICD-10-CM

## 2025-07-30 DIAGNOSIS — I50.22 CHRONIC SYSTOLIC CHF (CONGESTIVE HEART FAILURE) (HCC): Primary | ICD-10-CM

## 2025-07-30 DIAGNOSIS — I50.22 CHRONIC SYSTOLIC CHF (CONGESTIVE HEART FAILURE) (HCC): ICD-10-CM

## 2025-07-30 PROCEDURE — G8417 CALC BMI ABV UP PARAM F/U: HCPCS | Performed by: PHYSICIAN ASSISTANT

## 2025-07-30 PROCEDURE — G8427 DOCREV CUR MEDS BY ELIG CLIN: HCPCS | Performed by: PHYSICIAN ASSISTANT

## 2025-07-30 PROCEDURE — 1090F PRES/ABSN URINE INCON ASSESS: CPT | Performed by: PHYSICIAN ASSISTANT

## 2025-07-30 PROCEDURE — 3017F COLORECTAL CA SCREEN DOC REV: CPT | Performed by: PHYSICIAN ASSISTANT

## 2025-07-30 PROCEDURE — 1159F MED LIST DOCD IN RCRD: CPT | Performed by: PHYSICIAN ASSISTANT

## 2025-07-30 PROCEDURE — 1124F ACP DISCUSS-NO DSCNMKR DOCD: CPT | Performed by: PHYSICIAN ASSISTANT

## 2025-07-30 PROCEDURE — 1160F RVW MEDS BY RX/DR IN RCRD: CPT | Performed by: PHYSICIAN ASSISTANT

## 2025-07-30 PROCEDURE — 93000 ELECTROCARDIOGRAM COMPLETE: CPT | Performed by: PHYSICIAN ASSISTANT

## 2025-07-30 PROCEDURE — 99214 OFFICE O/P EST MOD 30 MIN: CPT | Performed by: PHYSICIAN ASSISTANT

## 2025-07-30 PROCEDURE — 1126F AMNT PAIN NOTED NONE PRSNT: CPT | Performed by: PHYSICIAN ASSISTANT

## 2025-07-30 PROCEDURE — 1036F TOBACCO NON-USER: CPT | Performed by: PHYSICIAN ASSISTANT

## 2025-07-30 PROCEDURE — G8399 PT W/DXA RESULTS DOCUMENT: HCPCS | Performed by: PHYSICIAN ASSISTANT

## 2025-07-30 RX ORDER — AMIODARONE HYDROCHLORIDE 200 MG/1
TABLET ORAL
Qty: 90 TABLET | Refills: 3 | Status: SHIPPED | OUTPATIENT
Start: 2025-07-30 | End: 2025-09-12

## 2025-08-11 ENCOUNTER — HOSPITAL ENCOUNTER (OUTPATIENT)
Dept: LAB | Age: 70
Discharge: HOME | End: 2025-08-11
Payer: MEDICARE

## 2025-08-11 DIAGNOSIS — D05.10 DUCTAL CARCINOMA IN SITU (DCIS) OF BREAST, UNSPECIFIED LATERALITY: ICD-10-CM

## 2025-08-11 DIAGNOSIS — E55.9 VITAMIN D DEFICIENCY: ICD-10-CM

## 2025-08-11 LAB
25(OH)D3 SERPL-MCNC: 41.9 NG/ML (ref 30–100)
ALBUMIN SERPL-MCNC: 3.9 G/DL (ref 3.2–4.6)
ALBUMIN/GLOB SERPL: 1 (ref 1–1.9)
ALP SERPL-CCNC: 136 U/L (ref 35–104)
ALT SERPL-CCNC: 10 U/L (ref 8–45)
ANION GAP SERPL CALC-SCNC: 10 MMOL/L (ref 7–16)
AST SERPL-CCNC: 19 U/L (ref 15–37)
BASOPHILS # BLD: 0.05 K/UL (ref 0–0.2)
BASOPHILS NFR BLD: 0.7 % (ref 0–2)
BILIRUB SERPL-MCNC: 0.6 MG/DL (ref 0–1.2)
BUN SERPL-MCNC: 19 MG/DL (ref 8–23)
CALCIUM SERPL-MCNC: 9.7 MG/DL (ref 8.8–10.2)
CHLORIDE SERPL-SCNC: 103 MMOL/L (ref 98–107)
CO2 SERPL-SCNC: 25 MMOL/L (ref 20–29)
CREAT SERPL-MCNC: 1.3 MG/DL (ref 0.6–1.1)
DIFFERENTIAL METHOD BLD: NORMAL
EOSINOPHIL # BLD: 0.16 K/UL (ref 0–0.8)
EOSINOPHIL NFR BLD: 2.3 % (ref 0.5–7.8)
ERYTHROCYTE [DISTWIDTH] IN BLOOD BY AUTOMATED COUNT: 13.5 % (ref 11.9–14.6)
GLOBULIN SER CALC-MCNC: 3.8 G/DL (ref 2.3–3.5)
GLUCOSE SERPL-MCNC: 104 MG/DL (ref 70–99)
HCT VFR BLD AUTO: 45.2 % (ref 35.8–46.3)
HGB BLD-MCNC: 14.7 G/DL (ref 11.7–15.4)
IMM GRANULOCYTES # BLD AUTO: 0.01 K/UL (ref 0–0.5)
IMM GRANULOCYTES NFR BLD AUTO: 0.1 % (ref 0–5)
LYMPHOCYTES # BLD: 1.82 K/UL (ref 0.5–4.6)
LYMPHOCYTES NFR BLD: 26.5 % (ref 13–44)
MCH RBC QN AUTO: 30.8 PG (ref 26.1–32.9)
MCHC RBC AUTO-ENTMCNC: 32.5 G/DL (ref 31.4–35)
MCV RBC AUTO: 94.6 FL (ref 82–102)
MONOCYTES # BLD: 0.43 K/UL (ref 0.1–1.3)
MONOCYTES NFR BLD: 6.3 % (ref 4–12)
NEUTS SEG # BLD: 4.4 K/UL (ref 1.7–8.2)
NEUTS SEG NFR BLD: 64.1 % (ref 43–78)
NRBC # BLD: 0 K/UL (ref 0–0.2)
PLATELET # BLD AUTO: 218 K/UL (ref 150–450)
PMV BLD AUTO: 9.9 FL (ref 9.4–12.3)
POTASSIUM SERPL-SCNC: 5.2 MMOL/L (ref 3.5–5.1)
PROT SERPL-MCNC: 7.7 G/DL (ref 6.3–8.2)
RBC # BLD AUTO: 4.78 M/UL (ref 4.05–5.2)
SODIUM SERPL-SCNC: 138 MMOL/L (ref 136–145)
WBC # BLD AUTO: 6.9 K/UL (ref 4.3–11.1)

## 2025-08-11 PROCEDURE — 82306 VITAMIN D 25 HYDROXY: CPT

## 2025-08-11 PROCEDURE — 85025 COMPLETE CBC W/AUTO DIFF WBC: CPT

## 2025-08-11 PROCEDURE — 80053 COMPREHEN METABOLIC PANEL: CPT

## 2025-08-11 PROCEDURE — 36415 COLL VENOUS BLD VENIPUNCTURE: CPT

## 2025-08-13 ENCOUNTER — OFFICE VISIT (OUTPATIENT)
Dept: ONCOLOGY | Age: 70
End: 2025-08-13

## 2025-08-13 VITALS
TEMPERATURE: 97.6 F | RESPIRATION RATE: 16 BRPM | WEIGHT: 200 LBS | HEIGHT: 69 IN | DIASTOLIC BLOOD PRESSURE: 52 MMHG | OXYGEN SATURATION: 97 % | BODY MASS INDEX: 29.62 KG/M2 | SYSTOLIC BLOOD PRESSURE: 94 MMHG | HEART RATE: 62 BPM

## 2025-08-13 DIAGNOSIS — E55.9 VITAMIN D DEFICIENCY: ICD-10-CM

## 2025-08-13 DIAGNOSIS — D05.10 DUCTAL CARCINOMA IN SITU (DCIS) OF BREAST, UNSPECIFIED LATERALITY: ICD-10-CM

## 2025-08-13 DIAGNOSIS — D05.11 DUCTAL CARCINOMA IN SITU (DCIS) OF RIGHT BREAST: Primary | ICD-10-CM

## 2025-08-13 DIAGNOSIS — Z79.811 AROMATASE INHIBITOR USE: ICD-10-CM

## 2025-08-13 RX ORDER — LETROZOLE 2.5 MG/1
2.5 TABLET, FILM COATED ORAL DAILY
Qty: 30 TABLET | Refills: 11 | Status: SHIPPED | OUTPATIENT
Start: 2025-08-13

## 2025-08-13 ASSESSMENT — ENCOUNTER SYMPTOMS
HEMOPTYSIS: 0
CHEST TIGHTNESS: 0
VOMITING: 0
SHORTNESS OF BREATH: 0
NAUSEA: 0
DIARRHEA: 0
CONSTIPATION: 0
ABDOMINAL PAIN: 0
WHEEZING: 0
SCLERAL ICTERUS: 0
VOICE CHANGE: 0
TROUBLE SWALLOWING: 0
BLOOD IN STOOL: 0
SORE THROAT: 0
ABDOMINAL DISTENTION: 0

## 2025-08-13 ASSESSMENT — PATIENT HEALTH QUESTIONNAIRE - PHQ9
SUM OF ALL RESPONSES TO PHQ QUESTIONS 1-9: 0
1. LITTLE INTEREST OR PLEASURE IN DOING THINGS: NOT AT ALL
SUM OF ALL RESPONSES TO PHQ QUESTIONS 1-9: 0
2. FEELING DOWN, DEPRESSED OR HOPELESS: NOT AT ALL
SUM OF ALL RESPONSES TO PHQ QUESTIONS 1-9: 0
SUM OF ALL RESPONSES TO PHQ QUESTIONS 1-9: 0

## (undated) DEVICE — COVER,MAYO STAND,STERILE: Brand: MEDLINE

## (undated) DEVICE — SOLUTION ANTIFOG VIS SYS CLEARIFY LAPSCP

## (undated) DEVICE — SOLUTION IRRIGATION STRL H2O 1000 ML UROMATIC CONTAINER

## (undated) DEVICE — ROBOTIC HYSTERECTOMY: Brand: MEDLINE INDUSTRIES, INC.

## (undated) DEVICE — CANISTER, RIGID, 2000CC: Brand: MEDLINE INDUSTRIES, INC.

## (undated) DEVICE — SUTURE ABSORBABLE BRAIDED 2-0 CT-1 27 IN UD VICRYL J259H

## (undated) DEVICE — LITHOTOMY: Brand: MEDLINE INDUSTRIES, INC.

## (undated) DEVICE — 1840 FOAM BLOCK NEEDLE COUNTER: Brand: DEVON

## (undated) DEVICE — SUTURE V-LOC 90 3-0 L9IN ABSRB VLT L26MM V-20 1/2 CIR TAPR VLOCM0644

## (undated) DEVICE — SYRINGE MED 10ML LUERLOCK TIP W/O SFTY DISP

## (undated) DEVICE — SUTURE VICRYL + SZ 0 L27IN ABSRB UD CT-1 L36MM 1/2 CIR TAPR VCP260H

## (undated) DEVICE — DRESSING POSTOP AG PRISMASEAL 3.5X6IN

## (undated) DEVICE — Device

## (undated) DEVICE — SEAL

## (undated) DEVICE — NEEDLE HYPO BLNT 18 GAX1 IN PLAS HUB W/O SYR FILTER SS PNK

## (undated) DEVICE — GLOVE SURG SZ 6 L12IN FNGR THK79MIL GRN LTX FREE

## (undated) DEVICE — PUMP SUC IRR TBNG L10FT W/ HNDPC ASSEMB STRYKEFLOW 2

## (undated) DEVICE — RESERVOIR,SUCTION,100CC,SILICONE: Brand: MEDLINE

## (undated) DEVICE — SOLUTION IRRIG 1000ML 0.9% SOD CHL USP POUR PLAS BTL

## (undated) DEVICE — SUTURE MONOCRYL SZ 2-0 L27IN ABSRB VLT CT-2 L26MM 1/2 CIR Y333H

## (undated) DEVICE — AGENT HEMSTAT 8ML FLX TIP MTRX + DISP SURGIFLO

## (undated) DEVICE — SOL IRR SOD CHL 0.9% TITAN XL CNTNR 3000ML

## (undated) DEVICE — LIQUIBAND RAPID ADHESIVE 36/CS 0.8ML: Brand: MEDLINE

## (undated) DEVICE — TISSUE RETRIEVAL SYSTEM: Brand: INZII RETRIEVAL SYSTEM

## (undated) DEVICE — APPLICATOR MEDICATED 26 CC SOLUTION HI LT ORNG CHLORAPREP

## (undated) DEVICE — INTENDED FOR TISSUE SEPARATION, AND OTHER PROCEDURES THAT REQUIRE A SHARP SURGICAL BLADE TO PUNCTURE OR CUT.: Brand: BARD-PARKER ® STAINLESS STEEL BLADES

## (undated) DEVICE — ROBOTIC DRAPE WITH LEGGINGS: Brand: CONVERTORS

## (undated) DEVICE — GARMENT,MEDLINE,DVT,INT,CALF,MED, GEN2: Brand: MEDLINE

## (undated) DEVICE — AIRSEAL 8 MM ACCESS PORT AND LOW PROFILE OBTURATOR WITH BLADELESS OPTICAL TIP, 120 MM LENGTH: Brand: AIRSEAL

## (undated) DEVICE — CONTROL SYRINGE LUER-LOCK TIP: Brand: MONOJECT

## (undated) DEVICE — BLADELESS OBTURATOR: Brand: WECK VISTA

## (undated) DEVICE — SUTURE PDS II SZ 0 L27IN ABSRB VLT L26MM CT-2 1/2 CIR Z334H

## (undated) DEVICE — PLASMABLADE X PS210-030S-LIGHT 3.0SL: Brand: PLASMABLADE™ X

## (undated) DEVICE — COLUMN DRAPE

## (undated) DEVICE — SUTURE MONOCRYL SZ 4-0 L27IN ABSRB UD L19MM PS-2 1/2 CIR PRIM Y426H

## (undated) DEVICE — GAUZE,SPONGE,2"X2",8PLY,STERILE,LF,2'S: Brand: MEDLINE

## (undated) DEVICE — VCARE SMALL, UTERINE MANIPULATOR, VAGINAL-CERVICAL-AHLUWALIA'S-RETRACTOR-ELEVATOR: Brand: VCARE

## (undated) DEVICE — CATHETER URETH 16FR BLLN 5CC SIL ALLY W/ SIL HYDRGEL 2 W F

## (undated) DEVICE — YANKAUER,BULB TIP,W/O VENT,RIGID,STERILE: Brand: MEDLINE

## (undated) DEVICE — 1LYRTR 16FR10ML 100%SILI SNAP: Brand: MEDLINE INDUSTRIES, INC.

## (undated) DEVICE — SYRINGE MED 20ML STD CLR PLAS LUERLOCK TIP N CTRL DISP

## (undated) DEVICE — SUTURE PROL SZ 0 L30IN NONABSORBABLE BLU L26MM CT-2 1/2 CIR 8412H

## (undated) DEVICE — TIP COVER ACCESSORY

## (undated) DEVICE — SOLUTION SCRB 4OZ 4% CHG H2O AIDED FOR PREOPERATIVE SKIN

## (undated) DEVICE — SUTURE VICRYL SZ 2-0 L36IN ABSRB UD L36MM CT-1 1/2 CIR J945H

## (undated) DEVICE — 3M™ TEGADERM™ TRANSPARENT FILM DRESSING FRAME STYLE, 1624W, 2-3/8 IN X 2-3/4 IN (6 CM X 7 CM), 100/CT 4CT/CASE: Brand: 3M™ TEGADERM™

## (undated) DEVICE — PAD PT POS 36 IN SURGYPAD DISP

## (undated) DEVICE — TUBING, SUCTION, 1/4" X 10', STRAIGHT: Brand: MEDLINE

## (undated) DEVICE — ARM DRAPE

## (undated) DEVICE — APPLICATOR ENDOSCP L34CM W/ S STL CANN PLAS OBT STYL FOR

## (undated) DEVICE — GLOVE SURG SZ 6 THK91MIL LTX FREE SYN POLYISOPRENE ANTI

## (undated) DEVICE — SUREFIT, DUAL DISPERSIVE ELECTRODE, CONTACT QUALITY MONITOR: Brand: SUREFIT